# Patient Record
Sex: FEMALE | Race: WHITE | NOT HISPANIC OR LATINO | Employment: UNEMPLOYED | URBAN - NONMETROPOLITAN AREA
[De-identification: names, ages, dates, MRNs, and addresses within clinical notes are randomized per-mention and may not be internally consistent; named-entity substitution may affect disease eponyms.]

---

## 2017-07-11 ENCOUNTER — OFFICE VISIT (OUTPATIENT)
Dept: FAMILY MEDICINE CLINIC | Facility: CLINIC | Age: 37
End: 2017-07-11

## 2017-07-11 ENCOUNTER — APPOINTMENT (OUTPATIENT)
Dept: LAB | Facility: HOSPITAL | Age: 37
End: 2017-07-11

## 2017-07-11 VITALS
BODY MASS INDEX: 39.32 KG/M2 | HEART RATE: 90 BPM | SYSTOLIC BLOOD PRESSURE: 140 MMHG | HEIGHT: 65 IN | DIASTOLIC BLOOD PRESSURE: 80 MMHG | OXYGEN SATURATION: 98 % | WEIGHT: 236 LBS

## 2017-07-11 DIAGNOSIS — Z83.42 FAMILY HISTORY OF HIGH CHOLESTEROL: ICD-10-CM

## 2017-07-11 DIAGNOSIS — D68.59 PROTEIN S DEFICIENCY (HCC): ICD-10-CM

## 2017-07-11 DIAGNOSIS — Z00.00 ROUTINE GENERAL MEDICAL EXAMINATION AT A HEALTH CARE FACILITY: ICD-10-CM

## 2017-07-11 LAB
CHOLEST SERPL-MCNC: 150 MG/DL (ref 0–199)
HBA1C MFR BLD: 5.5 % (ref 4–5.6)
HDLC SERPL-MCNC: 54 MG/DL (ref 60–200)
LDLC SERPL CALC-MCNC: 56 MG/DL (ref 0–129)
LDLC/HDLC SERPL: 1.04 {RATIO} (ref 0–3.22)
T4 FREE SERPL-MCNC: 0.87 NG/DL (ref 0.78–2.19)
TRIGL SERPL-MCNC: 200 MG/DL (ref 20–199)
TSH SERPL DL<=0.05 MIU/L-ACNC: 2.34 MIU/ML (ref 0.46–4.68)
VLDLC SERPL-MCNC: 40 MG/DL

## 2017-07-11 PROCEDURE — 83036 HEMOGLOBIN GLYCOSYLATED A1C: CPT | Performed by: FAMILY MEDICINE

## 2017-07-11 PROCEDURE — 84443 ASSAY THYROID STIM HORMONE: CPT | Performed by: FAMILY MEDICINE

## 2017-07-11 PROCEDURE — 99385 PREV VISIT NEW AGE 18-39: CPT | Performed by: FAMILY MEDICINE

## 2017-07-11 PROCEDURE — 84439 ASSAY OF FREE THYROXINE: CPT | Performed by: FAMILY MEDICINE

## 2017-07-11 PROCEDURE — 80061 LIPID PANEL: CPT | Performed by: FAMILY MEDICINE

## 2017-07-11 PROCEDURE — 36415 COLL VENOUS BLD VENIPUNCTURE: CPT | Performed by: FAMILY MEDICINE

## 2017-07-11 NOTE — PROGRESS NOTES
Subjective   Leona Cardoso is a 37 y.o. female.     History of Present Illness requesting routine evaluation.  Patient  carries diagnoses of chronic protein S deficiency.    Has had 2 blood clots in the past 20 years.  Has been pregnant ×5.   was placed on no medicines following last pregnancy.  Have  need for hematological follow-up.  Strong family history of same.  Also strong family history of cardiovascular disease.  Does not smoke does not drink.  Otherwise history is significant for a cholecystectomy.  Currently feels well doses with gynecology routinely.  Declines vaccines.    The following portions of the patient's history were reviewed and updated as appropriate: allergies, current medications, past family history, past medical history, past social history, past surgical history and problem list.    Review of Systems   Constitutional: Negative for activity change, appetite change, fatigue and unexpected weight change.   HENT: Negative for trouble swallowing and voice change.    Eyes: Negative for redness and visual disturbance.   Respiratory: Negative for cough and wheezing.    Cardiovascular: Negative for chest pain and palpitations.   Gastrointestinal: Negative for abdominal pain, constipation, diarrhea, nausea and vomiting.   Genitourinary: Negative for urgency.   Musculoskeletal: Negative for joint swelling.   Neurological: Negative for syncope and headaches.   Hematological: Negative for adenopathy.   Psychiatric/Behavioral: Negative for sleep disturbance.       Objective   Physical Exam   Constitutional: She is oriented to person, place, and time. She appears well-developed.   HENT:   Head: Normocephalic.   Right Ear: External ear normal.   Left Ear: External ear normal.   Nose: Nose normal.   Mouth/Throat: Oropharynx is clear and moist.   Eyes: Conjunctivae are normal. Pupils are equal, round, and reactive to light.   Neck: Normal range of motion. Neck supple. No  thyromegaly present.   Cardiovascular: Normal rate, regular rhythm, normal heart sounds and intact distal pulses.    Pulmonary/Chest: Effort normal.   Abdominal: Soft. She exhibits no distension.   Musculoskeletal: Normal range of motion.   Neurological: She is alert and oriented to person, place, and time. She has normal reflexes.   Skin: Skin is warm and dry.   Psychiatric: She has a normal mood and affect. Her behavior is normal. Judgment and thought content normal.       Assessment/Plan   Problems Addressed this Visit        Hematopoietic and Hemostatic    Protein S deficiency    Relevant Orders    Ambulatory Referral to Hematology / Oncology       Other    BMI 39.0-39.9,adult - Primary (Chronic)    Relevant Orders    TSH    T4, Free    Hemoglobin A1c      Other Visit Diagnoses     Routine general medical examination at a health care facility        Family history of high cholesterol        Relevant Orders    Lipid Panel With LDL / HDL Ratio         need for follow-up protein S.  Defer to gynecology for routine health matters reproductive health.  Screening labs drawn.  Counseled flu vaccine in the fall.   on weight loss measures.  Recheck as directed

## 2017-07-27 ENCOUNTER — APPOINTMENT (OUTPATIENT)
Dept: LAB | Facility: HOSPITAL | Age: 37
End: 2017-07-27

## 2017-07-27 ENCOUNTER — OFFICE VISIT (OUTPATIENT)
Dept: OBSTETRICS AND GYNECOLOGY | Facility: CLINIC | Age: 37
End: 2017-07-27

## 2017-07-27 VITALS — WEIGHT: 231 LBS | HEIGHT: 64 IN | BODY MASS INDEX: 39.44 KG/M2

## 2017-07-27 DIAGNOSIS — Z01.419 GYNECOLOGIC EXAM NORMAL: Primary | ICD-10-CM

## 2017-07-27 DIAGNOSIS — N92.6 MISSED PERIOD: ICD-10-CM

## 2017-07-27 LAB — HCG SERPL QL: NEGATIVE

## 2017-07-27 PROCEDURE — 84703 CHORIONIC GONADOTROPIN ASSAY: CPT | Performed by: OBSTETRICS & GYNECOLOGY

## 2017-07-27 PROCEDURE — 99395 PREV VISIT EST AGE 18-39: CPT | Performed by: OBSTETRICS & GYNECOLOGY

## 2017-07-27 NOTE — PROGRESS NOTES
Subjective   Leona Cardoso is a 37 y.o. female.     HPI Comments: Patient presents today for annual gynecologic exam  LMP  and hasn't started her period this month. Usually has a period every 26-28 days.  Did have some spotting  which was light for a few days.    Had 2 SABs and 1 ectopic  S/p BTL  Last pap was 2016 and all have been normal  No family h/o breast cancer    Gynecologic Exam   Associated symptoms include headaches.       The following portions of the patient's history were reviewed and updated as appropriate: allergies, current medications, past family history, past medical history, past social history, past surgical history and problem list.      Review of Systems   Constitutional: Positive for fatigue.   Genitourinary: Positive for menstrual problem (recent mild irregularity).   Neurological: Positive for headaches.   All other systems reviewed and are negative.      Objective   Physical Exam   Constitutional: She is oriented to person, place, and time. She appears well-developed and well-nourished. No distress.   HENT:   Head: Normocephalic and atraumatic.   Right Ear: External ear normal.   Left Ear: External ear normal.   Nose: Nose normal.   Mouth/Throat: Oropharynx is clear and moist. No oropharyngeal exudate.   Eyes: Conjunctivae and EOM are normal. Pupils are equal, round, and reactive to light. Right eye exhibits no discharge. Left eye exhibits no discharge. No scleral icterus.   Neck: Normal range of motion. Neck supple. No tracheal deviation present. No thyromegaly present.   Cardiovascular: Normal rate, regular rhythm, normal heart sounds and intact distal pulses.  Exam reveals no gallop and no friction rub.    No murmur heard.  Pulmonary/Chest: Effort normal and breath sounds normal. No respiratory distress. She has no wheezes. She has no rales. She exhibits no mass, no tenderness, no laceration, no deformity and no retraction. Right breast exhibits no inverted  nipple, no mass, no nipple discharge, no skin change and no tenderness. Left breast exhibits no inverted nipple, no mass, no nipple discharge, no skin change and no tenderness. Breasts are symmetrical. There is no breast swelling.   Abdominal: Soft. She exhibits no distension and no mass. There is no tenderness. There is no rebound and no guarding. No hernia.   Genitourinary: Vagina normal and uterus normal. No breast tenderness, discharge or bleeding. No vaginal discharge found.   Musculoskeletal: Normal range of motion. She exhibits no edema or deformity.   Neurological: She is alert and oriented to person, place, and time. No cranial nerve deficit. Coordination normal.   Skin: Skin is warm and dry. No rash noted. She is not diaphoretic. No erythema. No pallor.   Psychiatric: She has a normal mood and affect. Her behavior is normal. Judgment and thought content normal.   Vitals reviewed.      Assessment/Plan   Leona was seen today for gynecologic exam.    Diagnoses and all orders for this visit:    Gynecologic exam normal    Missed period  -     hCG, Serum, Qualitative       Pap not indicated  Check serum HCG as patient does not feel that she needs to urinate.  Call with HCG results

## 2017-07-31 ENCOUNTER — CONSULT (OUTPATIENT)
Dept: ONCOLOGY | Facility: CLINIC | Age: 37
End: 2017-07-31

## 2017-07-31 ENCOUNTER — LAB (OUTPATIENT)
Dept: ONCOLOGY | Facility: HOSPITAL | Age: 37
End: 2017-07-31

## 2017-07-31 VITALS
RESPIRATION RATE: 18 BRPM | TEMPERATURE: 97.9 F | SYSTOLIC BLOOD PRESSURE: 148 MMHG | DIASTOLIC BLOOD PRESSURE: 91 MMHG | HEART RATE: 80 BPM | HEIGHT: 64 IN | WEIGHT: 232 LBS | BODY MASS INDEX: 39.61 KG/M2

## 2017-07-31 DIAGNOSIS — D68.59 PROTEIN S DEFICIENCY (HCC): Primary | ICD-10-CM

## 2017-07-31 DIAGNOSIS — D68.59 PROTEIN S DEFICIENCY (HCC): ICD-10-CM

## 2017-07-31 LAB
ALBUMIN SERPL-MCNC: 4.1 G/DL (ref 3.4–4.8)
ALBUMIN/GLOB SERPL: 1.3 G/DL (ref 1.1–1.8)
ALP SERPL-CCNC: 97 U/L (ref 38–126)
ALT SERPL W P-5'-P-CCNC: 33 U/L (ref 9–52)
ANION GAP SERPL CALCULATED.3IONS-SCNC: 10 MMOL/L (ref 5–15)
APTT PPP: 30.3 SECONDS (ref 20–40.3)
AST SERPL-CCNC: 22 U/L (ref 14–36)
BASOPHILS # BLD AUTO: 0.04 10*3/MM3 (ref 0–0.2)
BASOPHILS NFR BLD AUTO: 0.6 % (ref 0–2)
BILIRUB SERPL-MCNC: 0.3 MG/DL (ref 0.2–1.3)
BUN BLD-MCNC: 14 MG/DL (ref 7–21)
BUN/CREAT SERPL: 17.5 (ref 7–25)
CALCIUM SPEC-SCNC: 8.6 MG/DL (ref 8.4–10.2)
CHLORIDE SERPL-SCNC: 105 MMOL/L (ref 95–110)
CO2 SERPL-SCNC: 24 MMOL/L (ref 22–31)
CREAT BLD-MCNC: 0.8 MG/DL (ref 0.5–1)
DEPRECATED RDW RBC AUTO: 40.2 FL (ref 36.4–46.3)
EOSINOPHIL # BLD AUTO: 0.32 10*3/MM3 (ref 0–0.7)
EOSINOPHIL NFR BLD AUTO: 4.9 % (ref 0–7)
ERYTHROCYTE [DISTWIDTH] IN BLOOD BY AUTOMATED COUNT: 13.3 % (ref 11.5–14.5)
FERRITIN SERPL-MCNC: 46.7 NG/ML (ref 6.2–137)
FOLATE SERPL-MCNC: 16.5 NG/ML (ref 2.76–21)
GFR SERPL CREATININE-BSD FRML MDRD: 80 ML/MIN/1.73 (ref 64–149)
GFR SERPL CREATININE-BSD FRML MDRD: 97 ML/MIN/1.73 (ref 64–149)
GLOBULIN UR ELPH-MCNC: 3.1 GM/DL (ref 2.3–3.5)
GLUCOSE BLD-MCNC: 91 MG/DL (ref 60–100)
HCT VFR BLD AUTO: 40.5 % (ref 35–45)
HGB BLD-MCNC: 13.3 G/DL (ref 12–15.5)
IMM GRANULOCYTES # BLD: 0.02 10*3/MM3 (ref 0–0.02)
IMM GRANULOCYTES NFR BLD: 0.3 % (ref 0–0.5)
INR PPP: 0.96 (ref 0.8–1.2)
IRON 24H UR-MRATE: 38 MCG/DL (ref 37–170)
IRON SATN MFR SERPL: 11 % (ref 15–50)
LYMPHOCYTES # BLD AUTO: 1.78 10*3/MM3 (ref 0.6–4.2)
LYMPHOCYTES NFR BLD AUTO: 27.1 % (ref 10–50)
MCH RBC QN AUTO: 27.3 PG (ref 26.5–34)
MCHC RBC AUTO-ENTMCNC: 32.8 G/DL (ref 31.4–36)
MCV RBC AUTO: 83 FL (ref 80–98)
MONOCYTES # BLD AUTO: 0.59 10*3/MM3 (ref 0–0.9)
MONOCYTES NFR BLD AUTO: 9 % (ref 0–12)
NEUTROPHILS # BLD AUTO: 3.82 10*3/MM3 (ref 2–8.6)
NEUTROPHILS NFR BLD AUTO: 58.1 % (ref 37–80)
PLATELET # BLD AUTO: 244 10*3/MM3 (ref 150–450)
PMV BLD AUTO: 9.5 FL (ref 8–12)
POTASSIUM BLD-SCNC: 3.7 MMOL/L (ref 3.5–5.1)
PROT SERPL-MCNC: 7.2 G/DL (ref 6.3–8.6)
PROTHROMBIN TIME: 12.7 SECONDS (ref 11.1–15.3)
RBC # BLD AUTO: 4.88 10*6/MM3 (ref 3.77–5.16)
SODIUM BLD-SCNC: 139 MMOL/L (ref 137–145)
TIBC SERPL-MCNC: 338 MCG/DL (ref 265–497)
VIT B12 BLD-MCNC: 294 PG/ML (ref 239–931)
WBC NRBC COR # BLD: 6.57 10*3/MM3 (ref 3.2–9.8)

## 2017-07-31 PROCEDURE — 85610 PROTHROMBIN TIME: CPT | Performed by: INTERNAL MEDICINE

## 2017-07-31 PROCEDURE — 85025 COMPLETE CBC W/AUTO DIFF WBC: CPT | Performed by: INTERNAL MEDICINE

## 2017-07-31 PROCEDURE — G0463 HOSPITAL OUTPT CLINIC VISIT: HCPCS | Performed by: INTERNAL MEDICINE

## 2017-07-31 PROCEDURE — 85613 RUSSELL VIPER VENOM DILUTED: CPT | Performed by: INTERNAL MEDICINE

## 2017-07-31 PROCEDURE — 83540 ASSAY OF IRON: CPT | Performed by: INTERNAL MEDICINE

## 2017-07-31 PROCEDURE — 82746 ASSAY OF FOLIC ACID SERUM: CPT | Performed by: INTERNAL MEDICINE

## 2017-07-31 PROCEDURE — 99204 OFFICE O/P NEW MOD 45 MIN: CPT | Performed by: INTERNAL MEDICINE

## 2017-07-31 PROCEDURE — 82728 ASSAY OF FERRITIN: CPT | Performed by: INTERNAL MEDICINE

## 2017-07-31 PROCEDURE — 85305 CLOT INHIBIT PROT S TOTAL: CPT | Performed by: INTERNAL MEDICINE

## 2017-07-31 PROCEDURE — 36415 COLL VENOUS BLD VENIPUNCTURE: CPT | Performed by: INTERNAL MEDICINE

## 2017-07-31 PROCEDURE — 81241 F5 GENE: CPT | Performed by: INTERNAL MEDICINE

## 2017-07-31 PROCEDURE — 82607 VITAMIN B-12: CPT | Performed by: INTERNAL MEDICINE

## 2017-07-31 PROCEDURE — 80053 COMPREHEN METABOLIC PANEL: CPT | Performed by: INTERNAL MEDICINE

## 2017-07-31 PROCEDURE — 85306 CLOT INHIBIT PROT S FREE: CPT | Performed by: INTERNAL MEDICINE

## 2017-07-31 PROCEDURE — 85302 CLOT INHIBIT PROT C ANTIGEN: CPT | Performed by: INTERNAL MEDICINE

## 2017-07-31 PROCEDURE — 85300 ANTITHROMBIN III ACTIVITY: CPT

## 2017-07-31 PROCEDURE — 85303 CLOT INHIBIT PROT C ACTIVITY: CPT | Performed by: INTERNAL MEDICINE

## 2017-07-31 PROCEDURE — 85730 THROMBOPLASTIN TIME PARTIAL: CPT | Performed by: INTERNAL MEDICINE

## 2017-07-31 PROCEDURE — 85670 THROMBIN TIME PLASMA: CPT | Performed by: INTERNAL MEDICINE

## 2017-07-31 PROCEDURE — 81240 F2 GENE: CPT | Performed by: INTERNAL MEDICINE

## 2017-07-31 PROCEDURE — 85705 THROMBOPLASTIN INHIBITION: CPT | Performed by: INTERNAL MEDICINE

## 2017-07-31 PROCEDURE — 85732 THROMBOPLASTIN TIME PARTIAL: CPT | Performed by: INTERNAL MEDICINE

## 2017-07-31 PROCEDURE — 83550 IRON BINDING TEST: CPT | Performed by: INTERNAL MEDICINE

## 2017-07-31 PROCEDURE — 83090 ASSAY OF HOMOCYSTEINE: CPT | Performed by: INTERNAL MEDICINE

## 2017-08-01 ENCOUNTER — TELEPHONE (OUTPATIENT)
Dept: ONCOLOGY | Facility: HOSPITAL | Age: 37
End: 2017-08-01

## 2017-08-01 RX ORDER — FERROUS SULFATE 325(65) MG
325 TABLET ORAL
Qty: 30 TABLET | Refills: 3 | Status: SHIPPED | OUTPATIENT
Start: 2017-08-01 | End: 2018-01-10

## 2017-08-01 RX ORDER — DOCUSATE SODIUM 100 MG/1
100 CAPSULE, LIQUID FILLED ORAL 2 TIMES DAILY PRN
Qty: 60 CAPSULE | Refills: 2 | Status: SHIPPED | OUTPATIENT
Start: 2017-08-01 | End: 2018-01-10

## 2017-08-01 RX ORDER — LANOLIN ALCOHOL/MO/W.PET/CERES
1000 CREAM (GRAM) TOPICAL DAILY
Qty: 90 TABLET | Refills: 1 | Status: SHIPPED | OUTPATIENT
Start: 2017-08-01 | End: 2018-01-10

## 2017-08-01 RX ORDER — FOLIC ACID 1 MG/1
1 TABLET ORAL DAILY
Qty: 90 TABLET | Refills: 1 | Status: SHIPPED | OUTPATIENT
Start: 2017-08-01 | End: 2018-01-10

## 2017-08-01 NOTE — TELEPHONE ENCOUNTER
Called patient and informed that Dr. Coppola sent prescriptions to her pharmacy to . Patient states understanding.

## 2017-08-01 NOTE — TELEPHONE ENCOUNTER
----- Message from Al Coppola MD sent at 8/1/2017  8:05 AM CDT -----  I have sent prescription for ferrous sulfate, vitamin B12, folic acid and Colace to her pharmacy.  Please let her know.  Thank you

## 2017-08-02 LAB
AT III PPP CHRO-ACNC: 109 % (ref 75–135)
CARDIOLIPIN IGA SER IA-ACNC: <9 APL U/ML (ref 0–11)
CARDIOLIPIN IGG SER IA-ACNC: <9 GPL U/ML (ref 0–14)
CARDIOLIPIN IGM SER IA-ACNC: <9 MPL U/ML (ref 0–12)
HCYS SERPL-SCNC: 7.8 UMOL/L (ref 0–15)

## 2017-08-03 LAB
DRVVT IMM 1:2 NP PPP: 45 SEC (ref 0–47)
LA NT DPL PPP: 47.5 SEC (ref 0–55)
LA NT DPL/LA NT HPL PPP-RTO: 1.15 RATIO (ref 0–1.4)
LA NT PLATELET PPP: 35.5 SEC (ref 0–51.9)
LUPUS ANTICOAGULANT REFLEX: ABNORMAL
SCREEN DRVVT: 55.8 SEC (ref 0–47)
THROMBIN TIME: 18.3 SEC (ref 0–23)

## 2017-08-04 LAB
PROT C AG PPP IA-ACNC: 79 % (ref 60–150)
PROT S FREE PPP-ACNC: 31 % (ref 57–157)
PROT S PPP-ACNC: 85 % (ref 60–150)
PROTEIN C-FUNCTIONAL: 86 % (ref 73–180)
PROTEIN S-FUNCTIONAL: 58 % (ref 63–140)
PRT C ACTIVITY (CHROMOGENIC): 97 %

## 2017-08-07 LAB
F5 GENE MUT ANL BLD/T: NORMAL
FACTOR V COMMENT: NORMAL

## 2017-08-08 LAB
F2 GENE MUT ANL BLD/T: NORMAL
Lab: NORMAL

## 2017-08-21 ENCOUNTER — APPOINTMENT (OUTPATIENT)
Dept: ONCOLOGY | Facility: CLINIC | Age: 37
End: 2017-08-21

## 2017-08-24 ENCOUNTER — OFFICE VISIT (OUTPATIENT)
Dept: ONCOLOGY | Facility: CLINIC | Age: 37
End: 2017-08-24

## 2017-08-24 VITALS
TEMPERATURE: 97.5 F | DIASTOLIC BLOOD PRESSURE: 82 MMHG | HEART RATE: 70 BPM | SYSTOLIC BLOOD PRESSURE: 156 MMHG | RESPIRATION RATE: 14 BRPM | HEIGHT: 66 IN | BODY MASS INDEX: 37.45 KG/M2 | WEIGHT: 233 LBS

## 2017-08-24 DIAGNOSIS — D68.59 PROTEIN S DEFICIENCY (HCC): Primary | ICD-10-CM

## 2017-08-24 PROCEDURE — 99214 OFFICE O/P EST MOD 30 MIN: CPT | Performed by: INTERNAL MEDICINE

## 2017-08-24 PROCEDURE — G0463 HOSPITAL OUTPT CLINIC VISIT: HCPCS | Performed by: INTERNAL MEDICINE

## 2017-08-24 NOTE — PROGRESS NOTES
DATE OF VISIT: 2017    REASON FOR VISIT:  History of right lower extremity DVT and protein S deficiency    HISTORY OF PRESENT ILLNESS:    37-year-old female with a past medical history significant for right lower extremity DVT which was initially diagnosed in  immediately after childbirth for which she took blood thinner for 6 month, patient had a recurrence of DVT in  again after childbirth for which she took blood thinner for 6 month.  At some point she was tested positive for protein S deficiency.  Patient also has a strong family history of blood clots in mother, sister as well as grandparents.  Patient was initially seen in consultation on 2017 and had blood work done to be tested for hypercoagulable state.  She is here to discuss the result of blood work.  Denies any swelling of lower extremity or any new shortness of breath.  Denies any fevers or chills or night sweats.    PAST MEDICAL HISTORY:    No past medical history on file.    SOCIAL HISTORY:    Social History   Substance Use Topics   • Smoking status: Former Smoker   • Smokeless tobacco: Never Used   • Alcohol use No      Comment: special occasions       Surgical History :  Past Surgical History:   Procedure Laterality Date   •  SECTION     • CHOLECYSTECTOMY         ALLERGIES:    No Known Allergies    REVIEW OF SYSTEMS:      CONSTITUTIONAL:  Complains of fatigue. Denies any fever, chills or weight loss.      HEENT:  No epistaxis, mouth sores or difficulty swallowing.     RESPIRATORY:  No new shortness of breath. No new cough or hemoptysis.     CARDIOVASCULAR:  No chest pain or palpitations.     GASTROINTESTINAL:  No abdominal pain nausea, vomiting or blood in the stool.     GENITOURINARY: No Dysuria or Hematuria.     MUSCULOSKELETAL:  Positive for chronic swelling of right lower extremity.     LYMPHATICS:  Denies any abnormal swollen glands anywhere in the body.     NEUROLOGICAL : No tingling or numbness. No headache or  "dizziness. No seizures or balance problems.     SKIN: No new skin lesions.      PHYSICAL EXAMINATION:      VITAL SIGNS:  /82  Pulse 70  Temp 97.5 °F (36.4 °C) (Temporal Artery )   Resp 14  Ht 66\" (167.6 cm)  Wt 233 lb (106 kg)  LMP 06/22/2017 (Exact Date)  BMI 37.61 kg/m2    GENERAL:  Not in any distress.    HEENT:  Normocephalic, Atraumatic.Mild Conjunctival pallor. No icterus. Extraocular Movements Intact. No Facial Asymmetry noted.    NECK:  No adenopathy. No JVD.    RESPIRATORY:  Fair air entry bilateral. No rhonchi or wheezing.    CARDIOVASCULAR:  S1, S2. Regular rate and rhythm. No murmur or gallop appreciated.    ABDOMEN:  Soft, obese, nontender. Bowel sounds present in all four quadrants.  No organomegaly appreciated.    EXTREMITIES:  1+ edema right lower extremity.No Calf Tenderness.    NEUROLOGIC:  Alert, awake and oriented ×3.  No  Motor or sensory deficit appreciated. Cranial Nerves 2-12 grossly intact.        DIAGNOSTIC DATA:    Glucose   Date Value Ref Range Status   07/31/2017 91 60 - 100 mg/dL Final     Sodium   Date Value Ref Range Status   07/31/2017 139 137 - 145 mmol/L Final     Potassium   Date Value Ref Range Status   07/31/2017 3.7 3.5 - 5.1 mmol/L Final     CO2   Date Value Ref Range Status   07/31/2017 24.0 22.0 - 31.0 mmol/L Final     Chloride   Date Value Ref Range Status   07/31/2017 105 95 - 110 mmol/L Final     Anion Gap   Date Value Ref Range Status   07/31/2017 10.0 5.0 - 15.0 mmol/L Final     Creatinine   Date Value Ref Range Status   07/31/2017 0.80 0.50 - 1.00 mg/dL Final     BUN   Date Value Ref Range Status   07/31/2017 14 7 - 21 mg/dL Final     BUN/Creatinine Ratio   Date Value Ref Range Status   07/31/2017 17.5 7.0 - 25.0 Final     Calcium   Date Value Ref Range Status   07/31/2017 8.6 8.4 - 10.2 mg/dL Final     eGFR Non  Amer   Date Value Ref Range Status   07/31/2017 80 64 - 149 mL/min/1.73 Final     Alkaline Phosphatase   Date Value Ref Range Status "   07/31/2017 97 38 - 126 U/L Final     Total Protein   Date Value Ref Range Status   07/31/2017 7.2 6.3 - 8.6 g/dL Final     ALT (SGPT)   Date Value Ref Range Status   07/31/2017 33 9 - 52 U/L Final     AST (SGOT)   Date Value Ref Range Status   07/31/2017 22 14 - 36 U/L Final     Total Bilirubin   Date Value Ref Range Status   07/31/2017 0.3 0.2 - 1.3 mg/dL Final     Albumin   Date Value Ref Range Status   07/31/2017 4.10 3.40 - 4.80 g/dL Final     Globulin   Date Value Ref Range Status   07/31/2017 3.1 2.3 - 3.5 gm/dL Final     A/G Ratio   Date Value Ref Range Status   07/31/2017 1.3 1.1 - 1.8 g/dL Final     Lab Results   Component Value Date    WBC 6.57 07/31/2017    HGB 13.3 07/31/2017    HCT 40.5 07/31/2017    MCV 83.0 07/31/2017     07/31/2017     Lab Results   Component Value Date    NEUTROABS 3.82 07/31/2017    IRON 38 07/31/2017    TIBC 338 07/31/2017    LABIRON 11 (L) 07/31/2017    FERRITIN 46.70 07/31/2017    [id number] 294 07/31/2017    FOLATE 16.50 07/31/2017     Protein C Activity      Ref Range & Units 3wk ago     Prt C Activity (Chromogenic) % 97   Comments: Reference Range:   17 years and older: 73 - 180   Resulting Agency  [facility]   Narrative   Performed at:  [address] [place] [address] [facility]  [address]  [id number]  : Ml Arteaga MD, Phone:  [phone]      Specimen Collected: 07/31/17  4:26 PM Last Resulted: 08/04/17                   Protein C & S, Functional      Ref Range & Units 3wk ago     Protein C-Functional 73 - 180 % 86   Protein S-Functional 63 - 140 % 58 (L)             Protein C & S Antigens   Order: [id number]   Status:  Final result   Visible to patient:  No (Not Released) Dx:  Protein S deficiency      Ref Range & Units 3wk ago     Protein C Antigen 60 - 150 % 79   Protein S Ag, Total 60 - 150 % 85   Comments: This test was developed and its performance characteristics   determined by [facility]. It has not been cleared or approved   by  the Food and Drug Administration.   Protein S Ag, Free 57 - 157 % 31 (L)           Rest of the hypercoagulable testing with lupus anticoagulant, anticardiolipin antibodies, factor V Leyden mutation, antithrombin III level and prothrombin gene mutation were negative on July 31, 2017.          ASSESSMENT AND PLAN:      1.  Protein S deficiency: Based on the hypercoagulable workup done on July 31, 2017 showing has a type III protein S deficiency with normal total protein S antigen, and decreasing protein S antigen free as well as protein S functional.  Since patient has a history of DVT ×2 first time in 1998 and second time in 2006 both time after childbirth she took Coumadin for 6 month on each occasion.  Since patient has a very strong family history of DVT and pulmonary embolism, her protein S deficiency is worrisome for recurrence of DVT.  She does not have blood clot since 2006.  Treatment option of starting blood thinner were discussed with patient.  At this point she wants to avoid blood thinner if possible.  It was discussed with patient with a history she is more prone to develop third episode of blood clot and it can be life-threatening.  We will refer her for second opinion with hematologist either at [facility] or [facility] regarding blood thinners.  At this point patient remains on aspirin 81 mg by mouth daily.  She was encouraged to keep taking that.    2.  History of 3 miscarriages before ninth week of pregnancy.  Lupus anticoagulant as well as anticardiolipin antibodies has been negative.    3.  Borderline vitamin B12 and low iron saturation.  Patient has been started on vitamin B12 thousand micrograms by mouth daily, folic acid 1 mg by mouth daily and ferrous sulfate 325 mg by mouth daily.  We will recheck her anemia workup around February 2018.  She was encouraged to keep taking a supplement until then.    4. Health maintenance: Patient does not smoke.  She is only 37 years of age and not  due for colonoscopy at this point.  Remains full code.     Al Coppola MD  8/24/2017  4:59 PM

## 2017-10-06 ENCOUNTER — APPOINTMENT (OUTPATIENT)
Dept: ONCOLOGY | Facility: HOSPITAL | Age: 37
End: 2017-10-06

## 2017-10-06 ENCOUNTER — APPOINTMENT (OUTPATIENT)
Dept: ONCOLOGY | Facility: CLINIC | Age: 37
End: 2017-10-06

## 2018-01-10 ENCOUNTER — TELEPHONE (OUTPATIENT)
Dept: FAMILY MEDICINE CLINIC | Facility: CLINIC | Age: 38
End: 2018-01-10

## 2018-01-10 ENCOUNTER — OFFICE VISIT (OUTPATIENT)
Dept: FAMILY MEDICINE CLINIC | Facility: CLINIC | Age: 38
End: 2018-01-10

## 2018-01-10 VITALS
WEIGHT: 235 LBS | SYSTOLIC BLOOD PRESSURE: 122 MMHG | BODY MASS INDEX: 39.15 KG/M2 | DIASTOLIC BLOOD PRESSURE: 80 MMHG | HEIGHT: 65 IN | TEMPERATURE: 98.6 F

## 2018-01-10 DIAGNOSIS — J31.0 RHINITIS, UNSPECIFIED CHRONICITY, UNSPECIFIED TYPE: ICD-10-CM

## 2018-01-10 DIAGNOSIS — J06.9 ACUTE URI: Primary | ICD-10-CM

## 2018-01-10 PROCEDURE — 99214 OFFICE O/P EST MOD 30 MIN: CPT | Performed by: FAMILY MEDICINE

## 2018-01-10 RX ORDER — GUAIFENESIN 600 MG/1
TABLET, EXTENDED RELEASE ORAL
Qty: 30 TABLET | Refills: 3 | Status: SHIPPED | OUTPATIENT
Start: 2018-01-10 | End: 2018-03-29

## 2018-01-10 RX ORDER — FLUTICASONE PROPIONATE 50 MCG
2 SPRAY, SUSPENSION (ML) NASAL DAILY
Qty: 1 BOTTLE | Refills: 5 | Status: SHIPPED | OUTPATIENT
Start: 2018-01-10 | End: 2018-09-18

## 2018-01-10 RX ORDER — SOD CHLOR,SOD BICARB/NETI POT
PACKET, WITH RINSE DEVICE NASAL
Qty: 50 EACH | Refills: 5 | Status: SHIPPED | OUTPATIENT
Start: 2018-01-10 | End: 2018-09-18

## 2018-01-10 NOTE — PROGRESS NOTES
Subjective   Leona Cardoso is a 37 y.o. female.     History of Present Illness   history of recurrent cough congestion coryza postnasal drip.  Off and on for the past several weeks.  Some barley mainly environmental.  History noted.  No smoke exposure.  Has not had a flu vaccine.  Counseled on same.    The following portions of the patient's history were reviewed and updated as appropriate: allergies, current medications, past family history, past medical history, past social history, past surgical history and problem list.    Review of Systems   Constitutional: Negative for activity change, appetite change, fatigue and unexpected weight change.   HENT: Positive for congestion and rhinorrhea. Negative for trouble swallowing and voice change.    Eyes: Negative for redness and visual disturbance.   Respiratory: Positive for cough. Negative for wheezing.    Cardiovascular: Negative for chest pain and palpitations.   Gastrointestinal: Negative for abdominal pain, constipation, diarrhea, nausea and vomiting.   Genitourinary: Negative for urgency.   Musculoskeletal: Negative for joint swelling.   Neurological: Negative for syncope and headaches.   Hematological: Negative for adenopathy.   Psychiatric/Behavioral: Negative for sleep disturbance.       Objective   Physical Exam   Constitutional: She is oriented to person, place, and time. She appears well-developed.   HENT:   Head: Normocephalic.   Right Ear: External ear normal.   Nose: Mucosal edema and rhinorrhea present.   Mouth/Throat: Oropharynx is clear and moist.   Eyes: Pupils are equal, round, and reactive to light.   Neck: Normal range of motion. No thyromegaly present.   Cardiovascular: Normal rate, regular rhythm, normal heart sounds and intact distal pulses.  Exam reveals no gallop and no friction rub.    No murmur heard.  Pulmonary/Chest: Breath sounds normal.   Abdominal: Soft. She exhibits no distension and no mass. There is no tenderness.    Musculoskeletal: Normal range of motion.   Neurological: She is alert and oriented to person, place, and time. She has normal reflexes.   Skin: Skin is warm and dry.   Psychiatric: She has a normal mood and affect.       Assessment/Plan   Problems Addressed this Visit     None      Visit Diagnoses     Acute URI    -  Primary    Relevant Medications    fluticasone (FLONASE) 50 MCG/ACT nasal spray    Hypertonic Nasal Wash (NASAFLO NETI POT NASAL WASH) pack    guaiFENesin (MUCINEX) 600 MG 12 hr tablet    Rhinitis, unspecified chronicity, unspecified type        Relevant Medications    fluticasone (FLONASE) 50 MCG/ACT nasal spray    Hypertonic Nasal Wash (NASAFLO NETI POT NASAL WASH) pack    guaiFENesin (MUCINEX) 600 MG 12 hr tablet          Suspect viral and environmental.   Tiny pot use Mucinex use preventative measures short-term Flonase symptomatic preventative measures discussed recheck as directed

## 2019-01-11 ENCOUNTER — APPOINTMENT (OUTPATIENT)
Dept: GENERAL RADIOLOGY | Facility: HOSPITAL | Age: 39
End: 2019-01-11

## 2019-01-11 ENCOUNTER — HOSPITAL ENCOUNTER (EMERGENCY)
Facility: HOSPITAL | Age: 39
Discharge: HOME | End: 2019-01-11
Attending: EMERGENCY MEDICINE | Admitting: EMERGENCY MEDICINE

## 2019-01-11 VITALS
WEIGHT: 237 LBS | OXYGEN SATURATION: 99 % | SYSTOLIC BLOOD PRESSURE: 165 MMHG | TEMPERATURE: 97.8 F | BODY MASS INDEX: 40.46 KG/M2 | RESPIRATION RATE: 18 BRPM | DIASTOLIC BLOOD PRESSURE: 80 MMHG | HEIGHT: 64 IN | HEART RATE: 96 BPM

## 2019-01-11 DIAGNOSIS — J06.9 UPPER RESPIRATORY TRACT INFECTION, UNSPECIFIED TYPE: Primary | ICD-10-CM

## 2019-01-11 DIAGNOSIS — R91.8 LUNG NODULES: ICD-10-CM

## 2019-01-11 LAB
FLUAV AG NPH QL: NEGATIVE
FLUBV AG NPH QL IA: NEGATIVE
S PYO AG THROAT QL: NEGATIVE

## 2019-01-11 PROCEDURE — 71046 X-RAY EXAM CHEST 2 VIEWS: CPT

## 2019-01-11 PROCEDURE — 99284 EMERGENCY DEPT VISIT MOD MDM: CPT

## 2019-01-11 PROCEDURE — 99283 EMERGENCY DEPT VISIT LOW MDM: CPT

## 2019-01-11 PROCEDURE — 87880 STREP A ASSAY W/OPTIC: CPT | Performed by: EMERGENCY MEDICINE

## 2019-01-11 PROCEDURE — 87804 INFLUENZA ASSAY W/OPTIC: CPT | Performed by: EMERGENCY MEDICINE

## 2019-01-11 PROCEDURE — 87081 CULTURE SCREEN ONLY: CPT | Performed by: EMERGENCY MEDICINE

## 2019-01-11 RX ORDER — ONDANSETRON 4 MG/1
4 TABLET, ORALLY DISINTEGRATING ORAL ONCE
Status: COMPLETED | OUTPATIENT
Start: 2019-01-11 | End: 2019-01-11

## 2019-01-11 RX ORDER — DOXYCYCLINE 100 MG/1
100 CAPSULE ORAL 2 TIMES DAILY
Qty: 14 CAPSULE | Refills: 0 | Status: SHIPPED | OUTPATIENT
Start: 2019-01-11 | End: 2019-01-18

## 2019-01-11 RX ORDER — DOXYCYCLINE 100 MG/1
100 CAPSULE ORAL ONCE
Status: COMPLETED | OUTPATIENT
Start: 2019-01-11 | End: 2019-01-11

## 2019-01-11 RX ADMIN — DOXYCYCLINE 100 MG: 100 CAPSULE ORAL at 22:32

## 2019-01-11 RX ADMIN — ONDANSETRON 4 MG: 4 TABLET, ORALLY DISINTEGRATING ORAL at 22:43

## 2019-01-12 NOTE — ED PROVIDER NOTES
Subjective   38-year-old female presents to the emergency Department with 1 day of upper respiratory congestion, sinus pressure, ear fullness, and productive cough.  She states she's had some intermittent symptoms over the last couple weeks but today her symptoms have been increasingly worse.  She has not had any relief with Sudafed or Mucinex.  She denies fever but has felt chilled.  She states the pressure in her head became so severe this morning when she got up out of bed she felt lightheaded.      Family history, surgical history, social history, current medications and allergies are reviewed with the patient and triage documentation and vitals are reviewed.          History provided by:  Patient   used: No        Review of Systems   Constitutional: Positive for chills. Negative for appetite change, fatigue and fever.   HENT: Positive for congestion, ear pain, postnasal drip, sinus pressure and sinus pain. Negative for ear discharge and sore throat.    Eyes: Negative for photophobia, pain, redness and visual disturbance.   Respiratory: Positive for cough. Negative for shortness of breath.    Gastrointestinal: Negative for abdominal pain, nausea and vomiting.   Genitourinary: Negative for decreased urine volume, dysuria, frequency and urgency.   Musculoskeletal: Positive for myalgias. Negative for arthralgias.   Skin: Negative for color change and rash.   Neurological: Positive for light-headedness and headaches.       Past Medical History:   Diagnosis Date   • Dvt femoral (deep venous thrombosis) (CMS/HCC)        No Known Allergies    Past Surgical History:   Procedure Laterality Date   •  SECTION     • CHOLECYSTECTOMY         Family History   Problem Relation Age of Onset   • Hypertension Mother    • Cancer Paternal Grandmother    • Heart disease Paternal Grandfather        Social History     Socioeconomic History   • Marital status:      Spouse name: Not on file   • Number  of children: Not on file   • Years of education: Not on file   • Highest education level: Not on file   Tobacco Use   • Smoking status: Former Smoker     Last attempt to quit: 7/9/2015     Years since quitting: 3.5   • Smokeless tobacco: Never Used   Substance and Sexual Activity   • Alcohol use: No     Comment: special occasions   • Drug use: No   • Sexual activity: Yes     Partners: Male     Birth control/protection: Surgical           Objective   Physical Exam   Constitutional: Vital signs are normal. She appears well-developed and well-nourished. She is active. She does not appear ill.   HENT:   Head: Normocephalic.   Right Ear: Tympanic membrane normal.   Left Ear: Tympanic membrane normal.   Nose: Mucosal edema present. Right sinus exhibits maxillary sinus tenderness. Right sinus exhibits no frontal sinus tenderness. Left sinus exhibits maxillary sinus tenderness. Left sinus exhibits no frontal sinus tenderness.   Mouth/Throat: Oropharynx is clear and moist and mucous membranes are normal. No oropharyngeal exudate, posterior oropharyngeal edema or posterior oropharyngeal erythema. Tonsils are 1+ on the right. Tonsils are 1+ on the left. No tonsillar exudate.   Eyes: Pupils are equal, round, and reactive to light.   Neck: Normal range of motion.   Cardiovascular: Normal rate and regular rhythm.   Pulmonary/Chest: Effort normal and breath sounds normal. No respiratory distress. She has no wheezes. She has no rales.   Abdominal: Soft. Bowel sounds are normal. She exhibits no distension.   Musculoskeletal: Normal range of motion.   Neurological: She is alert.   Skin: Skin is warm and dry. Capillary refill takes less than 2 seconds.   Nursing note and vitals reviewed.      Procedures  none         ED Course      Labs Reviewed   INFLUENZA ANTIGEN, RAPID - Normal   RAPID STREP A SCREEN - Normal   BETA HEMOLYTIC STREP CULTURE, THROAT     Xr Chest 2 View    Result Date: 1/11/2019  Narrative: TWO VIEW CHEST HISTORY:  Cough. Chills. Bodyaches. Frontal and lateral films of the chest were obtained. COMPARISON: None Two right upper lobe nodules and one left lower lobe pulmonary nodule. Left lower lobe pulmonary nodule is the largest, measuring 1.4 cm. Findings worrisome for metastatic disease. No acute infiltrate. The heart is not enlarged. The pulmonary vasculature is not increased. No pleural effusion. No pneumothorax. No acute osseous abnormality. Surgical clips right upper quadrant of the abdomen.     Impression: CONCLUSION: Two right upper lobe nodules and one left lower lobe pulmonary nodule. Left lower lobe pulmonary nodule is the largest, measuring 1.4 cm. Findings worrisome for metastatic disease. No acute infiltrate. [id number] Electronically signed by:  Aguila Bonilla MD  1/11/2019 8:25 PM [employer] Work[employer]: [facility]  Number of Diagnoses or Management Options  Lung nodules:   Upper respiratory tract infection, unspecified type:      Amount and/or Complexity of Data Reviewed  Clinical lab tests: reviewed  Tests in the radiology section of CPT®: reviewed    Patient Progress  Patient progress: stable    Influenza and strep screens are negative.  X-ray imaging reveals no infiltrate.  There are 2 right upper lobe nodules and one left lower lobe pulmonary nodule largest measuring 1.4 cm that is worrisome for cancerous or metastatic process.  Patient denies any recent abdominal discomfort, distention, or shortness of breath, or other symptoms.  She is advised of findings and need for outpatient CAT scanning to further evaluate these lesions.  She started on doxycycline to help with any bacterial process that may be leading to them as well as help with her sinus congestion and drainage.  She will follow up with primary care next week.  She is agreeable to discharge.    Final diagnoses:   Upper respiratory tract infection, unspecified type   Lung nodules            Tigre Murry DO  01/12/19 0230

## 2019-01-12 NOTE — ED NOTES
Pt vomited after putting clothes on to leave. Pt states pill was in emesis. MD notified. Zofran ODT ordered per MD. Per MD: pt to restart rx for doxycycline in the morning after she has food on her belly. Pt noted.      Roseanna Olivares, RN  01/11/19 [id number]

## 2019-01-12 NOTE — DISCHARGE INSTRUCTIONS
Please return with new or worsening symptoms.  Contact Dr. Esparza for follow-up appointment to schedule further imaging as soon as possible.  Get antibiotic filled and take as instructed for the complete course.

## 2019-01-14 LAB — BACTERIA SPEC AEROBE CULT: NORMAL

## 2019-01-15 ENCOUNTER — OFFICE VISIT (OUTPATIENT)
Dept: FAMILY MEDICINE CLINIC | Facility: CLINIC | Age: 39
End: 2019-01-15

## 2019-01-15 VITALS
WEIGHT: 237 LBS | HEIGHT: 64 IN | BODY MASS INDEX: 40.46 KG/M2 | DIASTOLIC BLOOD PRESSURE: 92 MMHG | TEMPERATURE: 98.1 F | SYSTOLIC BLOOD PRESSURE: 148 MMHG

## 2019-01-15 DIAGNOSIS — R05.3 COUGH, PERSISTENT: ICD-10-CM

## 2019-01-15 DIAGNOSIS — R93.89 ABNORMAL CHEST X-RAY: Primary | ICD-10-CM

## 2019-01-15 DIAGNOSIS — D68.59 PROTEIN S DEFICIENCY (HCC): ICD-10-CM

## 2019-01-15 PROCEDURE — 99214 OFFICE O/P EST MOD 30 MIN: CPT | Performed by: FAMILY MEDICINE

## 2019-01-15 NOTE — PROGRESS NOTES
Subjective   Leona Cardoso is a 38 y.o. female.     History of Present Illness   requesting evaluation of persistent cough abnormal chest x-ray.  Has been seen multiple times urgent care and ER for cough and congestion paramedics multiple medications.  Chest x-ray done in the emergency room a few days ago did reveal soft pulmonary nodules round not grossly calcified however.  Patient quit smoking about 4 years ago.  Has had the persistent drainage and cough as outlined.  Surrounds several small children.  Has protein S deficiency as outlined previously.    The following portions of the patient's history were reviewed and updated as appropriate: allergies, current medications, past family history, past medical history, past social history, past surgical history and problem list.    Review of Systems   Constitutional: Negative for activity change, appetite change, fatigue and unexpected weight change.   HENT: Positive for congestion and rhinorrhea. Negative for trouble swallowing and voice change.    Eyes: Negative for redness and visual disturbance.   Respiratory: Positive for cough. Negative for wheezing.    Cardiovascular: Negative for chest pain and palpitations.   Gastrointestinal: Negative for abdominal pain, constipation, diarrhea, nausea and vomiting.   Genitourinary: Negative for urgency.   Musculoskeletal: Negative for joint swelling.   Neurological: Negative for syncope and headaches.   Hematological: Negative for adenopathy.   Psychiatric/Behavioral: Negative for sleep disturbance.       Objective   Physical Exam   Constitutional: She is oriented to person, place, and time. She appears well-developed.   HENT:   Head: Normocephalic.   Right Ear: External ear normal.   Nose: Mucosal edema present.   Mouth/Throat: Oropharynx is clear and moist.   Eyes: Pupils are equal, round, and reactive to light.   Neck: Normal range of motion. No thyromegaly present.   Cardiovascular: Normal rate, regular rhythm,  normal heart sounds and intact distal pulses. Exam reveals no gallop and no friction rub.   No murmur heard.  Pulmonary/Chest: Effort normal and breath sounds normal.   Abdominal: Soft. She exhibits no distension and no mass. There is no tenderness.   Musculoskeletal: Normal range of motion.   Neurological: She is alert and oriented to person, place, and time. She has normal reflexes.   Skin: Skin is warm and dry.   Psychiatric: She has a normal mood and affect.       Assessment/Plan   Leona was seen today for follow-up.    Diagnoses and all orders for this visit:    Abnormal chest x-ray  -     CT Chest With Contrast; Future  -     Ambulatory Referral to Pulmonology    Protein S deficiency (CMS/HCC)    Cough, persistent  -     CT Chest With Contrast; Future  -     Ambulatory Referral to Pulmonology        Suspect great murmurs overlay.  However the abnormal chest x-ray does bring up question atypicals versus fungal versus other.  Have counseled fishing current antibiotics counseled on environmental control measures fluids etc.  CT ordered at also probably need to see pulmonary afterwards for further evaluation and treatment

## 2019-01-21 ENCOUNTER — HOSPITAL ENCOUNTER (OUTPATIENT)
Dept: CT IMAGING | Facility: HOSPITAL | Age: 39
Discharge: HOME | End: 2019-01-21
Admitting: FAMILY MEDICINE

## 2019-01-21 DIAGNOSIS — R93.89 ABNORMAL CHEST X-RAY: ICD-10-CM

## 2019-01-21 DIAGNOSIS — R05.3 COUGH, PERSISTENT: ICD-10-CM

## 2019-01-21 PROCEDURE — 71260 CT THORAX DX C+: CPT

## 2019-01-21 PROCEDURE — 25010000002 IOPAMIDOL 61 % SOLUTION: Performed by: FAMILY MEDICINE

## 2019-01-21 RX ADMIN — IOPAMIDOL 90 ML: 612 INJECTION, SOLUTION INTRAVENOUS at 15:49

## 2019-01-22 NOTE — PROGRESS NOTES
Pulmonary Consultation    Neymar Esparza MD,    Thank you for asking me to see Leona Cardoso for   Chief Complaint   Patient presents with   • Abnormal Chest X-ray   .    Subjective     History of Present Illness  Leona Cardoso is a 38 y.o. female with a PMH significant for morbid obesity, allergies, RLE DVT, and protein S deficiency who presents for evaluation of cough and abnormal CXR. Pt states she was having issues with URI so she went to the ED where she was told she had an abnormal CXR. She was referred to her PCP who recommended a CT chest which occurred this week and showed partially calcified lung nodules. Pt reports she was having chest congestion and dyspnea. She was prescribed 2 courses of abx but has mostly using OTC cold medicines. Pt finished her last abx <1wk ago. She continues with cough productive of clear sputum, and some dyspnea. Pt has not been given any inhalers. She has not been diagnosed with COPD or asthma. Pt previously smoked 1-1.5ppd for over 20yrs but she quit in . 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 Pt has 7 children. Her paternal grandmother  with possible lung disease and there is heart disease on her paternal side. There are blood clots on her maternal side.    Review of Systems: History obtained from chart review and the patient.  Review of Systems   Constitutional: Positive for fatigue.        Snoring   Respiratory: Positive for cough and shortness of breath.    Gastrointestinal: Positive for nausea.        Heartburn   Musculoskeletal: Negative for arthralgias and back pain.   Neurological: Positive for headaches.   Psychiatric/Behavioral: Positive for dysphoric mood.     As described in the HPI. Otherwise, remainder of ROS (14 systems) were negative.    Patient Active Problem List   Diagnosis   • BMI 39.0-39.9,adult   • Protein S deficiency (CMS/[facility])         Current Outpatient Medications:   •  loratadine (CLARITIN) 10  "MG tablet, Take 1 tablet by mouth Daily., Disp: 30 tablet, Rfl: 0  •  oxymetazoline (AFRIN) 0.05 % nasal spray, 2 sprays into the nostril(s) as directed by provider 2 (Two) Times a Day., Disp: , Rfl:   •  pseudoephedrine (SUDAFED 12 HOUR) 120 MG 12 hr tablet, Take 1 tablet by mouth Every 12 (Twelve) Hours. (Patient taking differently: Take 30 mg by mouth As Needed.), Disp: 20 tablet, Rfl: 0  •  albuterol sulfate  (90 Base) MCG/ACT inhaler, Inhale 2 puffs Every 4 (Four) Hours As Needed for Wheezing or Shortness of Air., Disp: 18 g, Rfl: 11  •  guaiFENesin (MUCINEX) 600 MG 12 hr tablet, Take 1,200 mg by mouth 2 (Two) Times a Day., Disp: , Rfl:     No Known Allergies    Past Medical History:   Diagnosis Date   • Dvt femoral (deep venous thrombosis) (CMS/[facility])      Past Surgical History:   Procedure Laterality Date   •  SECTION     • CHOLECYSTECTOMY       Social History     Socioeconomic History   • Marital status:      Spouse name: Not on file   • Number of children: Not on file   • Years of education: Not on file   • Highest education level: Not on file   Tobacco Use   • Smoking status: Former Smoker     Last attempt to quit: 2015     Years since quitting: 3.5   • Smokeless tobacco: Never Used   Substance and Sexual Activity   • Alcohol use: No     Comment: special occasions   • Drug use: No   • Sexual activity: Yes     Partners: Male     Birth control/protection: Surgical     Family History   Problem Relation Age of Onset   • Hypertension Mother    • Cancer Paternal Grandmother    • Heart disease Paternal Grandfather           Objective     Blood pressure 142/90, pulse 84, height 162.6 cm (64\"), weight 108 kg (239 lb), last menstrual period 2018, SpO2 98 %.  Physical Exam   Constitutional: She is oriented to person, place, and time. Vital signs are normal. She appears well-developed and well-nourished.   Morbidly obese   HENT:   Head: Normocephalic and atraumatic.   Nose: Mucosal edema " present.   Mouth/Throat: Uvula is midline, oropharynx is clear and moist and mucous membranes are normal.   Mallampati 2   Eyes: Conjunctivae, EOM and lids are normal. Pupils are equal, round, and reactive to light.   Eyeglasses   Neck: Trachea normal and normal range of motion. No tracheal tenderness present. No thyroid mass present.   Cardiovascular: Normal rate, regular rhythm and normal heart sounds. PMI is not displaced. Exam reveals no gallop.   No murmur heard.  Pulmonary/Chest: Effort normal and breath sounds normal. No respiratory distress. She has no decreased breath sounds. She has no wheezes. She has no rhonchi. Chest wall is not dull to percussion. She exhibits no tenderness.   Abdominal: Soft. Normal appearance and bowel sounds are normal. There is no hepatomegaly. There is no tenderness.   obese   Musculoskeletal:   Normal gait, no extremity edema     Vascular Status -  Her right foot exhibits no edema. Her left foot exhibits no edema.  Lymphadenopathy:        Head (right side): No submandibular adenopathy present.        Head (left side): No submandibular adenopathy present.     She has no cervical adenopathy.        Right: No supraclavicular adenopathy present.        Left: No supraclavicular adenopathy present.   Neurological: She is alert and oriented to person, place, and time.   Skin: Skin is warm and dry. No rash noted. No cyanosis. Nails show no clubbing.   Psychiatric: She has a normal mood and affect. Her speech is normal and behavior is normal. Judgment normal.   Nursing note and vitals reviewed.      PFTs: 1/23/19 (independently reviewed and interpreted by me)  Ratio 84  FVC 3.52/ 95%  FEV1 2.94/ 97%  TLC 3.88/ 76%  DLCO 20.33/ 83%  Normal spirometry and diffusing capacity.  No significant bronchodilator response.  Reduced TLC but normal FVC.  No comparative data available.    Radiology (independently reviewed and interpreted by me): CT chest with contrast 1/21/19 showed 1.2cm round nodule  superior segment LLL with faint rim of calcification, 8mm round nodule superior segment RLL with rim of calcification, 1.2 x 0.9cm ovoid nodule posterior RUL with possible rim of calcification, calcified right hilar node       Assessment/Plan     Leona was seen today for abnormal chest x-ray.    Diagnoses and all orders for this visit:    Cough  -     Pulmonary Function Test  -     albuterol sulfate  (90 Base) MCG/ACT inhaler; Inhale 2 puffs Every 4 (Four) Hours As Needed for Wheezing or Shortness of Air.    Abnormal CXR    Lung nodules  -     CT Chest Without Contrast; Future         Discussion/ Recommendations:   PFTs did not show any obstruction.  Her CT chest was personally reviewed and showed several very well-rounded nodules with what appears to be developing rims of calcification as well as a calcified hilar nodes suggesting granulomatous disease.  She does have a smoking history but has been quit for the last 4 years.  I think her cough and congestion is likely due to bronchitis from a recent URI which is improving, but I think she would benefit from albuterol to be used as needed.  Regarding her lung nodules, given the calcification, I recommended that we have close follow-up with a repeat CT chest in 3 months.  At that time if the nodules are further calcified we will stop following them and consider them benign calcified granulomas.    -Start albuterol as needed for dyspnea or wheeze.  Counseled on proper technique.  -CT chest without contrast in 3 months.  -If she has persistent upper respiratory symptoms, I would suggest starting a nasal steroid such as Flonase daily.  -No further antibiotics or steroids indicated.    Patient's Body mass index is 41.02 kg/m². BMI is above normal parameters. Recommendations include: exercise counseling.           Return in about 4 weeks (around 2/20/2019) for Recheck cough.      Thank you for allowing me to participate in the care of Leona Cardoso. Please  do not hesitate to contact me with any questions.         This document has been electronically signed by Lisa Bonilla MD on January 23, 2019 2:05 PM      Dictated using Dragon

## 2019-01-23 ENCOUNTER — OFFICE VISIT (OUTPATIENT)
Dept: PULMONOLOGY | Facility: CLINIC | Age: 39
End: 2019-01-23

## 2019-01-23 VITALS
WEIGHT: 239 LBS | HEIGHT: 64 IN | SYSTOLIC BLOOD PRESSURE: 142 MMHG | HEART RATE: 84 BPM | DIASTOLIC BLOOD PRESSURE: 90 MMHG | BODY MASS INDEX: 40.8 KG/M2 | OXYGEN SATURATION: 98 %

## 2019-01-23 DIAGNOSIS — Z87.891 PERSONAL HISTORY OF TOBACCO USE, PRESENTING HAZARDS TO HEALTH: ICD-10-CM

## 2019-01-23 DIAGNOSIS — R05.9 COUGH: Primary | ICD-10-CM

## 2019-01-23 DIAGNOSIS — R93.89 ABNORMAL CXR: ICD-10-CM

## 2019-01-23 DIAGNOSIS — R93.89 ABNORMAL CHEST X-RAY: Primary | ICD-10-CM

## 2019-01-23 DIAGNOSIS — E66.01 CLASS 3 SEVERE OBESITY DUE TO EXCESS CALORIES WITHOUT SERIOUS COMORBIDITY WITH BODY MASS INDEX (BMI) OF 40.0 TO 44.9 IN ADULT (HCC): ICD-10-CM

## 2019-01-23 DIAGNOSIS — R91.8 LUNG NODULES: ICD-10-CM

## 2019-01-23 PROBLEM — Z86.718 HISTORY OF DVT OF LOWER EXTREMITY: Status: ACTIVE | Noted: 2019-01-23

## 2019-01-23 PROCEDURE — 99204 OFFICE O/P NEW MOD 45 MIN: CPT | Performed by: INTERNAL MEDICINE

## 2019-01-23 PROCEDURE — 94729 DIFFUSING CAPACITY: CPT | Performed by: INTERNAL MEDICINE

## 2019-01-23 PROCEDURE — 94060 EVALUATION OF WHEEZING: CPT | Performed by: INTERNAL MEDICINE

## 2019-01-23 PROCEDURE — 94727 GAS DIL/WSHOT DETER LNG VOL: CPT | Performed by: INTERNAL MEDICINE

## 2019-01-23 RX ORDER — ALBUTEROL SULFATE 90 UG/1
2 AEROSOL, METERED RESPIRATORY (INHALATION) EVERY 4 HOURS PRN
Qty: 18 G | Refills: 11 | Status: SHIPPED | OUTPATIENT
Start: 2019-01-23 | End: 2019-10-29

## 2019-01-23 RX ORDER — OXYMETAZOLINE HYDROCHLORIDE 0.05 G/100ML
2 SPRAY NASAL AS NEEDED
COMMUNITY
End: 2019-10-29

## 2019-01-29 ENCOUNTER — OFFICE VISIT (OUTPATIENT)
Dept: ONCOLOGY | Facility: CLINIC | Age: 39
End: 2019-01-29

## 2019-01-29 ENCOUNTER — APPOINTMENT (OUTPATIENT)
Dept: ONCOLOGY | Facility: HOSPITAL | Age: 39
End: 2019-01-29

## 2019-01-29 VITALS
RESPIRATION RATE: 18 BRPM | HEART RATE: 67 BPM | DIASTOLIC BLOOD PRESSURE: 89 MMHG | BODY MASS INDEX: 41.11 KG/M2 | HEIGHT: 64 IN | WEIGHT: 240.8 LBS | TEMPERATURE: 97.9 F | SYSTOLIC BLOOD PRESSURE: 158 MMHG | OXYGEN SATURATION: 98 %

## 2019-01-29 DIAGNOSIS — Z86.718 HISTORY OF DVT OF LOWER EXTREMITY: Primary | ICD-10-CM

## 2019-01-29 DIAGNOSIS — R91.8 MULTIPLE LUNG NODULES ON CT: ICD-10-CM

## 2019-01-29 DIAGNOSIS — D68.59 PROTEIN S DEFICIENCY (HCC): Chronic | ICD-10-CM

## 2019-01-29 DIAGNOSIS — Z86.718 HISTORY OF DVT OF LOWER EXTREMITY: ICD-10-CM

## 2019-01-29 LAB
ALBUMIN SERPL-MCNC: 4.2 G/DL (ref 3.4–4.8)
ALBUMIN/GLOB SERPL: 1.4 G/DL (ref 1.1–1.8)
ALP SERPL-CCNC: 74 U/L (ref 38–126)
ALT SERPL W P-5'-P-CCNC: 20 U/L (ref 9–52)
ANION GAP SERPL CALCULATED.3IONS-SCNC: 6 MMOL/L (ref 5–15)
AST SERPL-CCNC: 21 U/L (ref 14–36)
BASOPHILS # BLD AUTO: 0.03 10*3/MM3 (ref 0–0.2)
BASOPHILS NFR BLD AUTO: 0.5 % (ref 0–2)
BILIRUB SERPL-MCNC: 0.5 MG/DL (ref 0.2–1.3)
BUN BLD-MCNC: 11 MG/DL (ref 7–21)
BUN/CREAT SERPL: 14.9 (ref 7–25)
CALCIUM SPEC-SCNC: 9.2 MG/DL (ref 8.4–10.2)
CHLORIDE SERPL-SCNC: 102 MMOL/L (ref 95–110)
CO2 SERPL-SCNC: 27 MMOL/L (ref 22–31)
CREAT BLD-MCNC: 0.74 MG/DL (ref 0.5–1)
DEPRECATED RDW RBC AUTO: 39.1 FL (ref 36.4–46.3)
EOSINOPHIL # BLD AUTO: 0.23 10*3/MM3 (ref 0–0.7)
EOSINOPHIL NFR BLD AUTO: 3.7 % (ref 0–7)
ERYTHROCYTE [DISTWIDTH] IN BLOOD BY AUTOMATED COUNT: 13 % (ref 11.5–14.5)
GFR SERPL CREATININE-BSD FRML MDRD: 87 ML/MIN/1.73 (ref 64–149)
GLOBULIN UR ELPH-MCNC: 3 GM/DL (ref 2.3–3.5)
GLUCOSE BLD-MCNC: 97 MG/DL (ref 60–100)
HCT VFR BLD AUTO: 40.8 % (ref 35–45)
HGB BLD-MCNC: 14.2 G/DL (ref 12–15.5)
IMM GRANULOCYTES # BLD AUTO: 0.01 10*3/MM3 (ref 0–0.02)
IMM GRANULOCYTES NFR BLD AUTO: 0.2 % (ref 0–0.5)
LYMPHOCYTES # BLD AUTO: 1.74 10*3/MM3 (ref 0.6–4.2)
LYMPHOCYTES NFR BLD AUTO: 27.6 % (ref 10–50)
MCH RBC QN AUTO: 28.7 PG (ref 26.5–34)
MCHC RBC AUTO-ENTMCNC: 34.8 G/DL (ref 31.4–36)
MCV RBC AUTO: 82.4 FL (ref 80–98)
MONOCYTES # BLD AUTO: 0.5 10*3/MM3 (ref 0–0.9)
MONOCYTES NFR BLD AUTO: 7.9 % (ref 0–12)
NEUTROPHILS # BLD AUTO: 3.79 10*3/MM3 (ref 2–8.6)
NEUTROPHILS NFR BLD AUTO: 60.1 % (ref 37–80)
PLATELET # BLD AUTO: 258 10*3/MM3 (ref 150–450)
PMV BLD AUTO: 9 FL (ref 8–12)
POTASSIUM BLD-SCNC: 3.8 MMOL/L (ref 3.5–5.1)
PROT SERPL-MCNC: 7.2 G/DL (ref 6.3–8.6)
RBC # BLD AUTO: 4.95 10*6/MM3 (ref 3.77–5.16)
SODIUM BLD-SCNC: 135 MMOL/L (ref 137–145)
WBC NRBC COR # BLD: 6.3 10*3/MM3 (ref 3.2–9.8)

## 2019-01-29 PROCEDURE — 99214 OFFICE O/P EST MOD 30 MIN: CPT | Performed by: INTERNAL MEDICINE

## 2019-01-29 PROCEDURE — G8417 CALC BMI ABV UP PARAM F/U: HCPCS | Performed by: INTERNAL MEDICINE

## 2019-01-29 PROCEDURE — 1123F ACP DISCUSS/DSCN MKR DOCD: CPT | Performed by: INTERNAL MEDICINE

## 2019-01-29 PROCEDURE — G0463 HOSPITAL OUTPT CLINIC VISIT: HCPCS | Performed by: INTERNAL MEDICINE

## 2019-01-29 PROCEDURE — 85025 COMPLETE CBC W/AUTO DIFF WBC: CPT | Performed by: INTERNAL MEDICINE

## 2019-01-29 PROCEDURE — 80053 COMPREHEN METABOLIC PANEL: CPT | Performed by: INTERNAL MEDICINE

## 2019-01-29 RX ORDER — WARFARIN SODIUM 7.5 MG/1
TABLET ORAL
Qty: 15 TABLET | Refills: 0 | Status: SHIPPED | OUTPATIENT
Start: 2019-01-29 | End: 2019-02-11 | Stop reason: SDUPTHER

## 2019-01-29 NOTE — PROGRESS NOTES
DATE OF VISIT: 2019      REASON FOR VISIT:  History of right lower extremity DVT and protein S deficiency ,lung nodules      HISTORY OF PRESENT ILLNESS:    38-year-old female with a past medical history significant for right lower extremity DVT which was initially diagnosed in  immediately after childbirth for which she took blood thinner for 6 month, patient had a recurrence of DVT in  again after childbirth for which she took blood thinner for 6 month.  At some point she was tested positive for protein S deficiency.  Patient also has a strong family history of blood clots in mother, sister as well as grandparents.  Patient was initially seen in consultation on 2017 and had hypercoagulable workup done which showed type III protein S deficiency.  Patient was recommended to start anticoagulation but she was reluctant to start anticoagulation at this point and she was lost to follow-up after 2017.  Patient is coming back to [facility] to discuss anticoagulation recommendation today.  Denies any swelling of lower extremity or any new shortness of breath.  Denies any fevers or chills or night sweats.        PAST MEDICAL HISTORY:    Past Medical History:   Diagnosis Date   • Dvt femoral (deep venous thrombosis) (CMS/[facility])        SOCIAL HISTORY:    Social History     Tobacco Use   • Smoking status: Former Smoker     Last attempt to quit: 2015     Years since quitting: 3.5   • Smokeless tobacco: Never Used   Substance Use Topics   • Alcohol use: No     Comment: special occasions   • Drug use: No       Surgical History :  Past Surgical History:   Procedure Laterality Date   •  SECTION     • CHOLECYSTECTOMY         ALLERGIES:    No Known Allergies    REVIEW OF SYSTEMS:      CONSTITUTIONAL:  Complains of fatigue. Denies any fever, chills or weight loss.      HEENT:  No epistaxis, mouth sores or difficulty swallowing.     RESPIRATORY:  No new shortness of breath. No new cough or  "hemoptysis.     CARDIOVASCULAR:  No chest pain or palpitations.     GASTROINTESTINAL:  No abdominal pain nausea, vomiting or blood in the stool.     GENITOURINARY: No Dysuria or Hematuria.     MUSCULOSKELETAL:  Positive for chronic swelling of right lower extremity.     LYMPHATICS:  Denies any abnormal swollen glands anywhere in the body.     NEUROLOGICAL : No tingling or numbness. No headache or dizziness. No seizures or balance problems.     SKIN: No new skin lesions.          PHYSICAL EXAMINATION:      VITAL SIGNS:  /89   Pulse 67   Temp 97.9 °F (36.6 °C) (Temporal)   Resp 18   Ht 162.6 cm (64.02\")   Wt 109 kg (240 lb 12.8 oz)   SpO2 98%   BMI 41.31 kg/m²      ECOG performance status: 1    GENERAL:  Not in any distress.Obese female.    HEENT:  Normocephalic, Atraumatic.Mild Conjunctival pallor. No icterus. Extraocular Movements Intact. No Facial Asymmetry noted.    NECK:  No adenopathy. No JVD.    RESPIRATORY:  Fair air entry bilateral. No rhonchi or wheezing.    CARDIOVASCULAR:  S1, S2. Regular rate and rhythm. No murmur or gallop appreciated.    ABDOMEN:  Soft, obese, nontender. Bowel sounds present in all four quadrants.  No hepatosplenomegaly appreciated due to body habitus.    MUSCULOSKELTAL:  Trace lower extremity edema.No Calf Tenderness.Decreased range of motion.    NEUROLOGIC:  Alert, awake and oriented ×3.  No  Motor or sensory deficit appreciated. Cranial Nerves 2-12 grossly intact.    SKIN: No new skin lesions.Skin is warm and moist to touch.    LYMPHATICS: No new lymph node enlargement in neck or supraclavicular area.        DIAGNOSTIC DATA:    Glucose   Date Value Ref Range Status   01/29/2019 97 60 - 100 mg/dL Final     Sodium   Date Value Ref Range Status   01/29/2019 135 (L) 137 - 145 mmol/L Final     Potassium   Date Value Ref Range Status   01/29/2019 3.8 3.5 - 5.1 mmol/L Final     CO2   Date Value Ref Range Status   01/29/2019 27.0 22.0 - 31.0 mmol/L Final     Chloride   Date " Value Ref Range Status   01/29/2019 102 95 - 110 mmol/L Final     Anion Gap   Date Value Ref Range Status   01/29/2019 6.0 5.0 - 15.0 mmol/L Final     Creatinine   Date Value Ref Range Status   01/29/2019 0.74 0.50 - 1.00 mg/dL Final     BUN   Date Value Ref Range Status   01/29/2019 11 7 - 21 mg/dL Final     BUN/Creatinine Ratio   Date Value Ref Range Status   01/29/2019 14.9 7.0 - 25.0 Final     Calcium   Date Value Ref Range Status   01/29/2019 9.2 8.4 - 10.2 mg/dL Final     eGFR Non  Amer   Date Value Ref Range Status   01/29/2019 87 64 - 149 mL/min/1.73 Final     Alkaline Phosphatase   Date Value Ref Range Status   01/29/2019 74 38 - 126 U/L Final     Total Protein   Date Value Ref Range Status   01/29/2019 7.2 6.3 - 8.6 g/dL Final     ALT (SGPT)   Date Value Ref Range Status   01/29/2019 20 9 - 52 U/L Final     AST (SGOT)   Date Value Ref Range Status   01/29/2019 21 14 - 36 U/L Final     Total Bilirubin   Date Value Ref Range Status   01/29/2019 0.5 0.2 - 1.3 mg/dL Final     Albumin   Date Value Ref Range Status   01/29/2019 4.20 3.40 - 4.80 g/dL Final     Globulin   Date Value Ref Range Status   01/29/2019 3.0 2.3 - 3.5 gm/dL Final     Lab Results   Component Value Date    WBC 6.30 01/29/2019    HGB 14.2 01/29/2019    HCT 40.8 01/29/2019    MCV 82.4 01/29/2019     01/29/2019     Lab Results   Component Value Date    NEUTROABS 3.79 01/29/2019    IRON 38 07/31/2017    TIBC 338 07/31/2017    LABIRON 11 (L) 07/31/2017    FERRITIN 46.70 07/31/2017    [id number] 294 07/31/2017    FOLATE 16.50 07/31/2017     Protein C Activity      Ref Range & Units 3wk ago     Prt C Activity (Chromogenic) % 97   Comments: Reference Range:   17 years and older: 73 - 180   Resulting Agency  [facility]   Narrative   Performed at:  [facility]  [address]  [id number]  : Ml Arteaga MD, Phone:  [phone]      Specimen Collected: 07/31/17  4:26 PM Last Resulted:  08/04/17                   Protein C & S, Functional      Ref Range & Units 3wk ago     Protein C-Functional 73 - 180 % 86   Protein S-Functional 63 - 140 % 58 (L)             Protein C & S Antigens   Order: [id number]   Status:  Final result   Visible to patient:  No (Not Released) Dx:  Protein S deficiency      Ref Range & Units 3wk ago     Protein C Antigen 60 - 150 % 79   Protein S Ag, Total 60 - 150 % 85   Comments: This test was developed and its performance characteristics   determined by [facility]. It has not been cleared or approved   by the Food and Drug Administration.   Protein S Ag, Free 57 - 157 % 31 (L)           Rest of the hypercoagulable testing with lupus anticoagulant, anticardiolipin antibodies, factor V Leyden mutation, antithrombin III level and prothrombin gene mutation were negative on July 31, 2017.        RADIOLOGY DATA:  CT chest with contrast done on January 21, 2019 showed:  IMPRESSION:  CONCLUSION:  Bilateral nodules with faint rims of peripheral calcification,  more likely due to an infectious or inflammatory process than  neoplasm. Recommend follow-up chest CT in three months to assess  for stability. These could also be further assessed with PET/CT  or percutaneous biopsy of the largest lesion.        ASSESSMENT AND PLAN:      1.  Protein S deficiency:  - Based on the hypercoagulable workup done on July 31, 2017 showing has a type III protein S deficiency with normal total protein S antigen, and decreasing protein S antigen free as well as protein S functional.    -Since patient has a history of DVT ×2 first time in 1998 and second time in 2006 both time after childbirth she took Coumadin for 6 month on each occasion.  Since patient has a very strong family history of DVT and pulmonary embolism, her protein S deficiency is worrisome for recurrence of DVT.  She does not have blood clot since 2006.    -In August 2017, for anticoagulation was recommended to patient.  However she was not  willing to do anticoagulation at that point.  She was also offered to go for second opinion, she did not do that secondary to financial constraints.  -Now patient is interested in taking anticoagulation again.  -Due to 2 episode of DVT and type III protein S deficiency, anticoagulation option consisting of Lovenox with Coumadin or newer anticoagulation like Apixaban or xarelto were discussed with patient.  -She is interested in taking Coumadin again.  -Prescription for Lovenox 100 mg twice daily and Coumadin 7.5 mg has been sent to her pharmacy today on January 29, 2019.  -Patient wants instructed not to start taking Coumadin until after fourth dose of Lovenox on day 2 due to protein S deficiency.    -We will also refer her to Coumadin clinic for further management of INR.    -We will ask patient to return to clinic in 3 months with a repeat CBC and CMP to be done on that day.  -Patient was instructed to come to the emergency room if she starts having any bleeding complication.      2.  History of 3 miscarriages before ninth week of pregnancy.  Lupus anticoagulant as well as anticardiolipin antibodies has been negative.    3.  Multiple lung nodules:  -Patient was found to have multiple lung nodules on a CT scan done in January 2019.  Nodules does have a calcification, most likely benign etiology.  -We will consider doing another CT scan around July 2019 for stability of her lung nodules since patient has a history of smoking.    4. Health maintenance: Patient does not smoke.  She is only 38 years of age and not due for colonoscopy at this point.      5. BMI: Patient's Body mass index is 41.31 kg/m². BMI is higher than reference range.  Patient is aware of elevated BMI    6. Advance Care Planning: For now patient remains full code and is able to make  Her decisions.  Patient has health care surrogate mentioned on chart.         Al Coppola MD  1/29/2019  4:46 PM

## 2019-02-04 ENCOUNTER — ANTICOAGULATION VISIT (OUTPATIENT)
Dept: CARDIAC SURGERY | Facility: CLINIC | Age: 39
End: 2019-02-04

## 2019-02-04 VITALS — DIASTOLIC BLOOD PRESSURE: 96 MMHG | HEART RATE: 89 BPM | OXYGEN SATURATION: 98 % | SYSTOLIC BLOOD PRESSURE: 148 MMHG

## 2019-02-04 DIAGNOSIS — Z86.718 HISTORY OF DVT OF LOWER EXTREMITY: ICD-10-CM

## 2019-02-04 DIAGNOSIS — D68.59 PROTEIN S DEFICIENCY (HCC): ICD-10-CM

## 2019-02-04 DIAGNOSIS — Z79.01 LONG TERM (CURRENT) USE OF ANTICOAGULANTS: ICD-10-CM

## 2019-02-04 LAB — INR PPP: 1.5 (ref 0.9–1.1)

## 2019-02-04 PROCEDURE — 85610 PROTHROMBIN TIME: CPT | Performed by: NURSE PRACTITIONER

## 2019-02-04 PROCEDURE — 99211 OFF/OP EST MAY X REQ PHY/QHP: CPT | Performed by: NURSE PRACTITIONER

## 2019-02-04 NOTE — PROGRESS NOTES
PT here today for enrollment in Coumadin Clinic. PT was dx several years ago with Protein S deficiency. PT has been on Coumadin for 6 month increments after 2 postpartum blood clots. PT has not been on any anticoagulation in 13 years but due to strong family hx, pt has decided to restart therapy after discussion with Dr Coppola. PT was given informational packet and key points reviewed. Educated pt on reasoning from Coumadin therapy which includes pt's risk of other blood clots due to clotting disorder. Explained mechanism of action with Coumadin and Vitamin k. Risk and benefits of therapy were explained including rationale for INR range. Stressed compliance and consistency with diet, medications and appts. PT was educated on interactions of other medications and instructed to call [facility] with any med changes immediately including those that are short term. Instructed pt to take Coumadin between supper time and bed time. PT was educated on expectations of visits including intervals and frequencies. Educated pt on dietary restrictions and Coumadin friendly diet. PT was given informational sheets regarding acceptable green vegs. PT was very concerned about dietary restrictions due to frequent consumption of restricted green vegs. Educated pt on reasoning behind not consuming those fruits and vegs. PT states she will avoid those high vitamin k foods. PT was instructed to contact [facility] for any planned medical procedures and to make sure all providers are aware of Coumadin therapy. PT was educated on need for Lovenox if Coumadin therapy is interrupted. PT was educated on s/s of bleeding and to report bleeding to ER immediately. PT was advised to wear medical alert bracelet. Emphasized safety while on blood thinner including power tool safety and to always obtain CT scan for head/blunt trauma. PT was instructed that Coumadin Clinic would manage pt's INR and refill Coumadin. 25 mins of direct patient education provided. PT was  instructed on dosing and to continue Lovenox 100mg BID. PT will be seen on Wednesday. PT states she is considering NOAC. PT has concerns at this time but will continue Coumadin therapy.       This document has been electronically signed by RISHABH Delvalle @  On February 4, 2019 1:19 PM

## 2019-02-06 ENCOUNTER — ANTICOAGULATION VISIT (OUTPATIENT)
Dept: CARDIAC SURGERY | Facility: CLINIC | Age: 39
End: 2019-02-06

## 2019-02-06 VITALS — DIASTOLIC BLOOD PRESSURE: 85 MMHG | HEART RATE: 78 BPM | OXYGEN SATURATION: 96 % | SYSTOLIC BLOOD PRESSURE: 130 MMHG

## 2019-02-06 DIAGNOSIS — Z86.718 HISTORY OF DVT OF LOWER EXTREMITY: ICD-10-CM

## 2019-02-06 DIAGNOSIS — Z79.01 LONG TERM (CURRENT) USE OF ANTICOAGULANTS: ICD-10-CM

## 2019-02-06 DIAGNOSIS — D68.59 PROTEIN S DEFICIENCY (HCC): ICD-10-CM

## 2019-02-06 LAB — INR PPP: 2.1 (ref 0.9–1.1)

## 2019-02-06 PROCEDURE — 99211 OFF/OP EST MAY X REQ PHY/QHP: CPT | Performed by: NURSE PRACTITIONER

## 2019-02-06 PROCEDURE — 85610 PROTHROMBIN TIME: CPT | Performed by: NURSE PRACTITIONER

## 2019-02-06 NOTE — PROGRESS NOTES
Today's INR is 2.1. Pt denies med changes or bleeding problems.  Dose slightly adjusted due to rapid rise in INR. Pt instructed to stop Lovenox injections, follow dosing calendar, and eat a serving of green veggies on Friday; pt verbalized. Patient instructed regarding medication; results given and questions answered. Nutritional counseling given.  Dietary factors affecting therapy addressed.  Patient instructed to monitor for excessive bruising or bleeding. Will recheck in 5 days.         This document has been electronically signed by NARDA Guardado @ on February 6, 2019 1:21 PM

## 2019-02-11 ENCOUNTER — ANTICOAGULATION VISIT (OUTPATIENT)
Dept: CARDIAC SURGERY | Facility: CLINIC | Age: 39
End: 2019-02-11

## 2019-02-11 ENCOUNTER — OFFICE VISIT (OUTPATIENT)
Dept: FAMILY MEDICINE CLINIC | Facility: CLINIC | Age: 39
End: 2019-02-11

## 2019-02-11 ENCOUNTER — DOCUMENTATION (OUTPATIENT)
Dept: CARDIAC SURGERY | Facility: CLINIC | Age: 39
End: 2019-02-11

## 2019-02-11 VITALS
WEIGHT: 240.4 LBS | HEIGHT: 64 IN | DIASTOLIC BLOOD PRESSURE: 90 MMHG | SYSTOLIC BLOOD PRESSURE: 148 MMHG | TEMPERATURE: 98 F | BODY MASS INDEX: 41.04 KG/M2

## 2019-02-11 VITALS — HEART RATE: 80 BPM | DIASTOLIC BLOOD PRESSURE: 98 MMHG | OXYGEN SATURATION: 98 % | SYSTOLIC BLOOD PRESSURE: 140 MMHG

## 2019-02-11 DIAGNOSIS — J06.9 ACUTE URI: Primary | ICD-10-CM

## 2019-02-11 DIAGNOSIS — Z79.01 LONG TERM (CURRENT) USE OF ANTICOAGULANTS: ICD-10-CM

## 2019-02-11 DIAGNOSIS — J02.9 ACUTE PHARYNGITIS, UNSPECIFIED ETIOLOGY: ICD-10-CM

## 2019-02-11 DIAGNOSIS — D68.59 PROTEIN S DEFICIENCY (HCC): ICD-10-CM

## 2019-02-11 DIAGNOSIS — J01.00 ACUTE NON-RECURRENT MAXILLARY SINUSITIS: ICD-10-CM

## 2019-02-11 DIAGNOSIS — Z86.718 HISTORY OF DVT OF LOWER EXTREMITY: ICD-10-CM

## 2019-02-11 LAB — INR PPP: 2.5 (ref 0.9–1.1)

## 2019-02-11 PROCEDURE — 99213 OFFICE O/P EST LOW 20 MIN: CPT | Performed by: FAMILY MEDICINE

## 2019-02-11 PROCEDURE — 85610 PROTHROMBIN TIME: CPT | Performed by: NURSE PRACTITIONER

## 2019-02-11 RX ORDER — WARFARIN SODIUM 7.5 MG/1
TABLET ORAL
Qty: 35 TABLET | Refills: 1 | Status: SHIPPED | OUTPATIENT
Start: 2019-02-11 | End: 2019-04-17 | Stop reason: SDUPTHER

## 2019-02-11 RX ORDER — FLUTICASONE PROPIONATE 50 MCG
2 SPRAY, SUSPENSION (ML) NASAL DAILY
Qty: 1 BOTTLE | Refills: 11 | Status: SHIPPED | OUTPATIENT
Start: 2019-02-11 | End: 2019-05-07

## 2019-02-11 RX ORDER — CEPHALEXIN 500 MG/1
500 CAPSULE ORAL 3 TIMES DAILY
Qty: 21 CAPSULE | Refills: 0 | Status: SHIPPED | OUTPATIENT
Start: 2019-02-11 | End: 2019-02-22

## 2019-02-11 NOTE — PROGRESS NOTES
Today's INR is 2.5. Denies any new medications or bleeding issues. Denies any s/s of blood clot. PT instructed on dosing and to continue Coumadin friendly diet. PT will be seen in 1 week. Patient instructed regarding medication; results given and questions answered. Nutritional counseling given.  Dietary factors affecting therapy addressed.  Patient instructed to monitor for excessive bruising or bleeding. PT verbalizes understanding.         This document has been electronically signed by Radha Hinton, NARDA @ on February 11, 2019 1:26 PM

## 2019-02-11 NOTE — PROGRESS NOTES
PT called and states she is starting Keflex TID x7 days today. Instructed to continue same dose but was given appt for Friday. PT verbalizes understanding.

## 2019-02-15 ENCOUNTER — ANTICOAGULATION VISIT (OUTPATIENT)
Dept: CARDIAC SURGERY | Facility: CLINIC | Age: 39
End: 2019-02-15

## 2019-02-15 VITALS — OXYGEN SATURATION: 99 % | HEART RATE: 87 BPM

## 2019-02-15 DIAGNOSIS — Z86.718 HISTORY OF DVT OF LOWER EXTREMITY: ICD-10-CM

## 2019-02-15 DIAGNOSIS — D68.59 PROTEIN S DEFICIENCY (HCC): ICD-10-CM

## 2019-02-15 DIAGNOSIS — Z79.01 LONG TERM (CURRENT) USE OF ANTICOAGULANTS: ICD-10-CM

## 2019-02-15 LAB — INR PPP: 2.2 (ref 0.9–1.1)

## 2019-02-15 PROCEDURE — 85610 PROTHROMBIN TIME: CPT | Performed by: NURSE PRACTITIONER

## 2019-02-15 NOTE — PROGRESS NOTES
Today's INR is 2.2. PT will be finish AB on Monday. Denies any other med changes or bleeding issues. Denies any s/s of blood clot. PT instructed to continue same dose but increase vit k tomorrow due to AB. PT will keep appt on Monday. Patient instructed regarding medication; results given and questions answered. Nutritional counseling given.  Dietary factors affecting therapy addressed.  Patient instructed to monitor for excessive bruising or bleeding. PT verbalizes understanding.       This document has been electronically signed by RISHABH Delvalle @  On February 15, 2019 2:36 PM

## 2019-02-18 ENCOUNTER — ANTICOAGULATION VISIT (OUTPATIENT)
Dept: CARDIAC SURGERY | Facility: CLINIC | Age: 39
End: 2019-02-18

## 2019-02-18 VITALS — HEART RATE: 72 BPM | SYSTOLIC BLOOD PRESSURE: 138 MMHG | DIASTOLIC BLOOD PRESSURE: 90 MMHG

## 2019-02-18 DIAGNOSIS — Z86.718 HISTORY OF DVT OF LOWER EXTREMITY: ICD-10-CM

## 2019-02-18 DIAGNOSIS — Z79.01 LONG TERM (CURRENT) USE OF ANTICOAGULANTS: ICD-10-CM

## 2019-02-18 DIAGNOSIS — D68.59 PROTEIN S DEFICIENCY (HCC): ICD-10-CM

## 2019-02-18 LAB — INR PPP: 2.1 (ref 0.9–1.1)

## 2019-02-18 PROCEDURE — 85610 PROTHROMBIN TIME: CPT | Performed by: NURSE PRACTITIONER

## 2019-02-18 NOTE — PROGRESS NOTES
Today's INR is 2.1.   Pt states no changes to meds or diet.  No bleeding issues.  Recheck INR this Friday current coumadin dosing schedule.  Pt verbalized instructions.  Patient instructed regarding medication; results given and questions answered. Nutritional counseling given.  Dietary factors affecting therapy addressed.  Patient instructed to monitor for excessive bruising or bleeding.      This document has been electronically signed by ERI DelvalleCNP-BC [place]  On February 18, 2019 12:35 PM

## 2019-02-21 NOTE — PROGRESS NOTES
Pulmonary Office Follow-up     Leona Cardoso is seen in the office today for   Chief Complaint   Patient presents with   • Cough   .    Subjective     History of Present Illness  Leona Cardoso is a 38 y.o. female with a PMH significant for morbid obesity, allergies, RLE DVT, and protein S deficiency who presents for follow-up of cough and dyspnea.  She was last seen on 1/23/19, at which time I recommended starting albuterol as needed and to have a repeat CT chest without contrast in 3 months.  She states that she has used albuterol a few times since her last visit when she felt tight in her chest and it has helped relieve that sensation.  She does complain of feeling jittery after using the albuterol, so she tries to not use it if she can.  Otherwise, her URI and cough have completely resolved.  She denies any recent illnesses or bleeding.  Patient is scheduled to have her INR checked today for continuing management of her warfarin.    Review of Systems: History obtained from chart review and the patient.  Review of Systems   Constitutional: Positive for fatigue.   Respiratory: Positive for chest tightness and shortness of breath. Negative for cough.    Musculoskeletal: Negative for arthralgias and back pain.     As described in the HPI. Otherwise, remainder of ROS (14 systems) were negative.    Patient Active Problem List   Diagnosis   • Class 3 severe obesity due to excess calories without serious comorbidity with body mass index (BMI) of 40.0 to 44.9 in adult (CMS/HCC)   • Protein S deficiency (CMS/[facility])   • Personal history of tobacco use, presenting hazards to health   • History of DVT of lower extremity   • Multiple lung nodules on CT   • Long term (current) use of anticoagulants [Z79.01]         Current Outpatient Medications:   •  fluticasone (FLONASE) 50 MCG/ACT nasal spray, 2 sprays into the nostril(s) as directed by provider Daily. Till symptoms gone, Disp: 1 bottle, Rfl: 11  •   "oxymetazoline (AFRIN) 0.05 % nasal spray, 2 sprays into the nostril(s) as directed by provider As Needed., Disp: , Rfl:   •  pseudoephedrine (SUDAFED 12 HOUR) 120 MG 12 hr tablet, Take 1 tablet by mouth Every 12 (Twelve) Hours. (Patient taking differently: Take 30 mg by mouth As Needed.), Disp: 20 tablet, Rfl: 0  •  warfarin (COUMADIN) 7.5 MG tablet, Take 1 tab nightly or AS DIRECTED PER COUMADIN CLINIC, Disp: 35 tablet, Rfl: 1  •  albuterol sulfate  (90 Base) MCG/ACT inhaler, Inhale 2 puffs Every 4 (Four) Hours As Needed for Wheezing or Shortness of Air., Disp: 18 g, Rfl: 11    No Known Allergies    Past Medical History:   Diagnosis Date   • Dvt femoral (deep venous thrombosis) (CMS/[facility])      Past Surgical History:   Procedure Laterality Date   •  SECTION     • CHOLECYSTECTOMY       Social History     Socioeconomic History   • Marital status:      Spouse name: Not on file   • Number of children: Not on file   • Years of education: Not on file   • Highest education level: Not on file   Tobacco Use   • Smoking status: Former Smoker     Last attempt to quit: 2015     Years since quitting: 3.6   • Smokeless tobacco: Never Used   Substance and Sexual Activity   • Alcohol use: No     Comment: special occasions   • Drug use: No   • Sexual activity: Yes     Partners: Male     Birth control/protection: Surgical     Family History   Problem Relation Age of Onset   • Hypertension Mother    • Cancer Paternal Grandmother    • Heart disease Paternal Grandfather           Objective     Blood pressure (!) 165/102, pulse 75, height 162.6 cm (64\"), weight 109 kg (240 lb), SpO2 98 %.  Physical Exam   Constitutional: She is oriented to person, place, and time. Vital signs are normal. She appears well-developed and well-nourished.   Morbidly obese   HENT:   Head: Normocephalic and atraumatic.   Nose: Nose normal. No mucosal edema or rhinorrhea.   Mouth/Throat: Uvula is midline, oropharynx is clear and moist " and mucous membranes are normal.   Mallampati 2   Eyes: Conjunctivae, EOM and lids are normal. Pupils are equal, round, and reactive to light.   Eyeglasses   Neck: Trachea normal and normal range of motion. No tracheal tenderness present. No thyroid mass present.   Cardiovascular: Normal rate, regular rhythm and normal heart sounds. PMI is not displaced. Exam reveals no gallop.   No murmur heard.  Pulmonary/Chest: Effort normal and breath sounds normal. No respiratory distress. She has no decreased breath sounds. She has no wheezes. She has no rhonchi. Chest wall is not dull to percussion. She exhibits no tenderness.   Abdominal: Soft. Normal appearance and bowel sounds are normal. There is no hepatomegaly. There is no tenderness.   obese   Musculoskeletal:   Normal gait, no extremity edema     Vascular Status -  Her right foot exhibits no edema. Her left foot exhibits no edema.  Lymphadenopathy:        Head (right side): No submandibular adenopathy present.        Head (left side): No submandibular adenopathy present.     She has no cervical adenopathy.        Right: No supraclavicular adenopathy present.        Left: No supraclavicular adenopathy present.   Neurological: She is alert and oriented to person, place, and time.   Skin: Skin is warm and dry. No rash noted. No cyanosis. Nails show no clubbing.   Psychiatric: She has a normal mood and affect. Her speech is normal and behavior is normal. Judgment normal.   Nursing note and vitals reviewed.      PFTs: 1/23/19 (independently reviewed and interpreted by me)  Ratio 84  FVC 3.52/ 95%  FEV1 2.94/ 97%  TLC 3.88/ 76%  DLCO 20.33/ 83%  Normal spirometry and diffusing capacity.  No significant bronchodilator response.  Reduced TLC but normal FVC.  No comparative data available.    Radiology (independently reviewed and interpreted by me): CT chest with contrast 1/21/19 showed 1.2cm round nodule superior segment LLL with faint rim of calcification, 8mm round nodule  superior segment RLL with rim of calcification, 1.2 x 0.9cm ovoid nodule posterior RUL with possible rim of calcification, calcified right hilar node       Assessment/Plan     Leona was seen today for cough.    Diagnoses and all orders for this visit:    Cough    Multiple lung nodules on CT    Class 3 severe obesity due to excess calories without serious comorbidity with body mass index (BMI) of 40.0 to 44.9 in adult (CMS/[facility])         Discussion/ Recommendations:   Given that she is only used her albuterol a few times since last visit I do not feel the escalation to a long-acting bronchodilator is necessary.  Otherwise, her cough and URI have continued to improve and she is not having any significant issues at this time.  She is currently scheduled to have a follow-up CT chest for later in April so I have encouraged her to keep this as scheduled to follow-up on her lung nodules.    -Continue albuterol as needed for dyspnea or wheeze.    -If she has increased albuterol need 3 or more times a week, I would recommend escalating to an ICS.  -CT chest without contrast on 4/24/19.   -If she develops recurrent rhinitis, I would recommend starting Flonase and nasal saline.    Patient's Body mass index is 41.2 kg/m². BMI is above normal parameters. Recommendations include: exercise counseling.           Return in about 2 months (around 4/25/2019) for F/u CT chest.      Thank you for allowing me to participate in the care of Leona Cardoso. Please do not hesitate to contact me with any questions.         This document has been electronically signed by Lisa Bonilla MD on February 22, 2019 10:52 AM      Dictated using Dragon

## 2019-02-22 ENCOUNTER — ANTICOAGULATION VISIT (OUTPATIENT)
Dept: CARDIAC SURGERY | Facility: CLINIC | Age: 39
End: 2019-02-22

## 2019-02-22 ENCOUNTER — OFFICE VISIT (OUTPATIENT)
Dept: PULMONOLOGY | Facility: CLINIC | Age: 39
End: 2019-02-22

## 2019-02-22 VITALS
BODY MASS INDEX: 40.97 KG/M2 | DIASTOLIC BLOOD PRESSURE: 102 MMHG | HEIGHT: 64 IN | WEIGHT: 240 LBS | OXYGEN SATURATION: 98 % | SYSTOLIC BLOOD PRESSURE: 165 MMHG | HEART RATE: 75 BPM

## 2019-02-22 DIAGNOSIS — R91.8 MULTIPLE LUNG NODULES ON CT: ICD-10-CM

## 2019-02-22 DIAGNOSIS — Z79.01 LONG TERM (CURRENT) USE OF ANTICOAGULANTS: ICD-10-CM

## 2019-02-22 DIAGNOSIS — D68.59 PROTEIN S DEFICIENCY (HCC): ICD-10-CM

## 2019-02-22 DIAGNOSIS — Z86.718 HISTORY OF DVT OF LOWER EXTREMITY: ICD-10-CM

## 2019-02-22 DIAGNOSIS — R05.9 COUGH: Primary | ICD-10-CM

## 2019-02-22 DIAGNOSIS — E66.01 CLASS 3 SEVERE OBESITY DUE TO EXCESS CALORIES WITHOUT SERIOUS COMORBIDITY WITH BODY MASS INDEX (BMI) OF 40.0 TO 44.9 IN ADULT (HCC): ICD-10-CM

## 2019-02-22 LAB — INR PPP: 3 (ref 0.9–1.1)

## 2019-02-22 PROCEDURE — 99213 OFFICE O/P EST LOW 20 MIN: CPT | Performed by: INTERNAL MEDICINE

## 2019-02-22 PROCEDURE — 85610 PROTHROMBIN TIME: CPT | Performed by: NURSE PRACTITIONER

## 2019-02-22 NOTE — PROGRESS NOTES
Today's INR is 3.0. Pt seeing Dr Bonilla today.  Pt denies med changes or bleeding problems. Pt states she has not eaten green veggies in about a week. Dose adjusted and pt instructed to have a serving of green veggies today and again in 4 days; pt verbalized. Patient instructed regarding medication; results given and questions answered. Nutritional counseling given.  Dietary factors affecting therapy addressed.  Patient instructed to monitor for excessive bruising or bleeding. Will recheck next week.         This document has been electronically signed by NARDA Guardado @ on February 22, 2019 10:53 AM

## 2019-02-28 ENCOUNTER — ANTICOAGULATION VISIT (OUTPATIENT)
Dept: CARDIAC SURGERY | Facility: CLINIC | Age: 39
End: 2019-02-28

## 2019-02-28 VITALS — DIASTOLIC BLOOD PRESSURE: 79 MMHG | OXYGEN SATURATION: 96 % | HEART RATE: 98 BPM | SYSTOLIC BLOOD PRESSURE: 141 MMHG

## 2019-02-28 DIAGNOSIS — D68.59 PROTEIN S DEFICIENCY (HCC): ICD-10-CM

## 2019-02-28 DIAGNOSIS — Z79.01 LONG TERM (CURRENT) USE OF ANTICOAGULANTS: ICD-10-CM

## 2019-02-28 DIAGNOSIS — Z86.718 HISTORY OF DVT OF LOWER EXTREMITY: ICD-10-CM

## 2019-02-28 LAB — INR PPP: 1.9 (ref 0.9–1.1)

## 2019-02-28 PROCEDURE — 85610 PROTHROMBIN TIME: CPT | Performed by: NURSE PRACTITIONER

## 2019-02-28 NOTE — PROGRESS NOTES
Today's INR is 1.9.  Pt denies med changes or bleeding problems. Pt states she accidentally missed her coumadin dose on Friday but had a glass of wine that evening. Due to missed pill dose not adjusted at this time. Pt instructed to have 1 serving of green veggies on Sunday and appt made for next week; pt verbalized. Patient instructed regarding medication; results given and questions answered. Nutritional counseling given.  Dietary factors affecting therapy addressed.  Patient instructed to monitor for excessive bruising or bleeding.         This document has been electronically signed by Radha Hinton, NARDA @ on February 28, 2019 1:09 PM

## 2019-03-07 ENCOUNTER — ANTICOAGULATION VISIT (OUTPATIENT)
Dept: CARDIAC SURGERY | Facility: CLINIC | Age: 39
End: 2019-03-07

## 2019-03-07 VITALS — HEART RATE: 72 BPM | DIASTOLIC BLOOD PRESSURE: 88 MMHG | SYSTOLIC BLOOD PRESSURE: 142 MMHG

## 2019-03-07 DIAGNOSIS — D68.59 PROTEIN S DEFICIENCY (HCC): ICD-10-CM

## 2019-03-07 DIAGNOSIS — Z79.01 LONG TERM (CURRENT) USE OF ANTICOAGULANTS: ICD-10-CM

## 2019-03-07 DIAGNOSIS — Z86.718 HISTORY OF DVT OF LOWER EXTREMITY: ICD-10-CM

## 2019-03-07 LAB — INR PPP: 1.8 (ref 0.9–1.1)

## 2019-03-07 PROCEDURE — 85610 PROTHROMBIN TIME: CPT | Performed by: NURSE PRACTITIONER

## 2019-03-07 PROCEDURE — 99211 OFF/OP EST MAY X REQ PHY/QHP: CPT | Performed by: NURSE PRACTITIONER

## 2019-03-07 NOTE — PROGRESS NOTES
Today's INR is 1.8.  Pt states she accidentally took 7.5mg last Friday and she only had a serving of green veggies once since last visit. Dose adjusted and pt instructed to hold green veggies 2-3 days; pt verbalized. Patient instructed regarding medication; results given and questions answered. Nutritional counseling given.  Dietary factors affecting therapy addressed.  Patient instructed to monitor for excessive bruising or bleeding. Will recheck next week.        This document has been electronically signed by NARDA Guardado @ on March 7, 2019 1:43 PM

## 2019-03-11 ENCOUNTER — APPOINTMENT (OUTPATIENT)
Dept: LAB | Facility: HOSPITAL | Age: 39
End: 2019-03-11

## 2019-03-11 ENCOUNTER — OFFICE VISIT (OUTPATIENT)
Dept: OBSTETRICS AND GYNECOLOGY | Facility: CLINIC | Age: 39
End: 2019-03-11

## 2019-03-11 VITALS — BODY MASS INDEX: 41.32 KG/M2 | WEIGHT: 242 LBS | HEIGHT: 64 IN

## 2019-03-11 DIAGNOSIS — Z01.419 WELL WOMAN EXAM WITH ROUTINE GYNECOLOGICAL EXAM: Primary | ICD-10-CM

## 2019-03-11 DIAGNOSIS — J30.2 SEASONAL ALLERGIC RHINITIS, UNSPECIFIED TRIGGER: ICD-10-CM

## 2019-03-11 DIAGNOSIS — N95.1 PERIMENOPAUSAL VASOMOTOR SYMPTOMS: ICD-10-CM

## 2019-03-11 DIAGNOSIS — H65.02 ACUTE SEROUS OTITIS MEDIA OF LEFT EAR, RECURRENCE NOT SPECIFIED: ICD-10-CM

## 2019-03-11 LAB
25(OH)D3 SERPL-MCNC: 27 NG/ML (ref 30–100)
ARTICHOKE IGE QN: 79 MG/DL (ref 1–129)
CHOLEST SERPL-MCNC: 158 MG/DL (ref 0–199)
HDLC SERPL-MCNC: 41 MG/DL (ref 60–200)
LDLC/HDLC SERPL: 1.76 {RATIO} (ref 0–3.22)
TRIGL SERPL-MCNC: 224 MG/DL (ref 20–199)
TSH SERPL DL<=0.05 MIU/L-ACNC: 1.45 MIU/ML (ref 0.46–4.68)

## 2019-03-11 PROCEDURE — 82306 VITAMIN D 25 HYDROXY: CPT | Performed by: NURSE PRACTITIONER

## 2019-03-11 PROCEDURE — 84443 ASSAY THYROID STIM HORMONE: CPT | Performed by: NURSE PRACTITIONER

## 2019-03-11 PROCEDURE — 82670 ASSAY OF TOTAL ESTRADIOL: CPT | Performed by: NURSE PRACTITIONER

## 2019-03-11 PROCEDURE — 84144 ASSAY OF PROGESTERONE: CPT | Performed by: NURSE PRACTITIONER

## 2019-03-11 PROCEDURE — 87624 HPV HI-RISK TYP POOLED RSLT: CPT | Performed by: NURSE PRACTITIONER

## 2019-03-11 PROCEDURE — 83002 ASSAY OF GONADOTROPIN (LH): CPT | Performed by: NURSE PRACTITIONER

## 2019-03-11 PROCEDURE — 36415 COLL VENOUS BLD VENIPUNCTURE: CPT | Performed by: NURSE PRACTITIONER

## 2019-03-11 PROCEDURE — G0123 SCREEN CERV/VAG THIN LAYER: HCPCS | Performed by: NURSE PRACTITIONER

## 2019-03-11 PROCEDURE — 83001 ASSAY OF GONADOTROPIN (FSH): CPT | Performed by: NURSE PRACTITIONER

## 2019-03-11 PROCEDURE — 83036 HEMOGLOBIN GLYCOSYLATED A1C: CPT | Performed by: NURSE PRACTITIONER

## 2019-03-11 PROCEDURE — 80061 LIPID PANEL: CPT | Performed by: NURSE PRACTITIONER

## 2019-03-11 RX ORDER — BUSPIRONE HYDROCHLORIDE 5 MG/1
5 TABLET ORAL 3 TIMES DAILY PRN
Qty: 90 TABLET | Refills: 3 | Status: SHIPPED | OUTPATIENT
Start: 2019-03-11 | End: 2021-04-09 | Stop reason: HOSPADM

## 2019-03-11 RX ORDER — LORATADINE 10 MG/1
10 TABLET ORAL DAILY
Qty: 30 TABLET | Refills: 11 | Status: SHIPPED | OUTPATIENT
Start: 2019-03-11 | End: 2019-05-07

## 2019-03-11 RX ORDER — AMOXICILLIN AND CLAVULANATE POTASSIUM 875; 125 MG/1; MG/1
1 TABLET, FILM COATED ORAL EVERY 12 HOURS
Qty: 14 TABLET | Refills: 0 | Status: SHIPPED | OUTPATIENT
Start: 2019-03-11 | End: 2019-03-18

## 2019-03-12 LAB
ESTRADIOL SERPL HS-MCNC: 47.8 PG/ML
HBA1C MFR BLD: 5.3 % (ref 4–5.6)
PROGEST SERPL-MCNC: <0.1 NG/ML

## 2019-03-13 LAB
GEN CATEG CVX/VAG CYTO-IMP: NORMAL
LAB AP CASE REPORT: NORMAL
LAB AP GYN ADDITIONAL INFORMATION: NORMAL
LAB AP GYN OTHER FINDINGS: NORMAL
PATH INTERP SPEC-IMP: NORMAL
STAT OF ADQ CVX/VAG CYTO-IMP: NORMAL

## 2019-03-14 LAB
FSH SERPL-ACNC: 7.1 MIU/ML
LH SERPL-ACNC: 4.03 MIU/ML

## 2019-03-15 ENCOUNTER — ANTICOAGULATION VISIT (OUTPATIENT)
Dept: CARDIAC SURGERY | Facility: CLINIC | Age: 39
End: 2019-03-15

## 2019-03-15 VITALS — SYSTOLIC BLOOD PRESSURE: 156 MMHG | HEART RATE: 88 BPM | DIASTOLIC BLOOD PRESSURE: 96 MMHG

## 2019-03-15 DIAGNOSIS — Z86.718 HISTORY OF DVT OF LOWER EXTREMITY: ICD-10-CM

## 2019-03-15 DIAGNOSIS — Z79.01 LONG TERM (CURRENT) USE OF ANTICOAGULANTS: ICD-10-CM

## 2019-03-15 DIAGNOSIS — D68.59 PROTEIN S DEFICIENCY (HCC): ICD-10-CM

## 2019-03-15 LAB
HPV I/H RISK 4 DNA CVX QL PROBE+SIG AMP: NEGATIVE
INR PPP: 2.7 (ref 0.9–1.1)

## 2019-03-15 PROCEDURE — 99211 OFF/OP EST MAY X REQ PHY/QHP: CPT | Performed by: NURSE PRACTITIONER

## 2019-03-15 PROCEDURE — 85610 PROTHROMBIN TIME: CPT | Performed by: NURSE PRACTITIONER

## 2019-03-15 NOTE — PROGRESS NOTES
Today's INR is 2.7. PT states that she will finish Augmentin on Monday am. PT is also on Buspar, Claritin, and Evening Primrose none of which interfere with Coumadin. Denies any s/s of blood clot. Adjusted pt's dose due to AB and instructed to increase vit k today. Recheck INR on Tuesday. Patient instructed regarding medication; results given and questions answered. Nutritional counseling given.  Dietary factors affecting therapy addressed.  Patient instructed to monitor for excessive bruising or bleeding. PT verbalizes understanding.         This document has been electronically signed by Radha Hinton, NARDA @ on March 15, 2019 2:04 PM

## 2019-03-18 RX ORDER — ERGOCALCIFEROL 1.25 MG/1
50000 CAPSULE ORAL WEEKLY
Qty: 5 CAPSULE | Refills: 0 | Status: SHIPPED | OUTPATIENT
Start: 2019-03-18 | End: 2019-04-16

## 2019-03-19 ENCOUNTER — ANTICOAGULATION VISIT (OUTPATIENT)
Dept: CARDIAC SURGERY | Facility: CLINIC | Age: 39
End: 2019-03-19

## 2019-03-19 VITALS — OXYGEN SATURATION: 99 % | DIASTOLIC BLOOD PRESSURE: 77 MMHG | HEART RATE: 93 BPM | SYSTOLIC BLOOD PRESSURE: 124 MMHG

## 2019-03-19 DIAGNOSIS — Z79.01 LONG TERM (CURRENT) USE OF ANTICOAGULANTS: ICD-10-CM

## 2019-03-19 DIAGNOSIS — Z86.718 HISTORY OF DVT OF LOWER EXTREMITY: ICD-10-CM

## 2019-03-19 DIAGNOSIS — D68.59 PROTEIN S DEFICIENCY (HCC): ICD-10-CM

## 2019-03-19 LAB — INR PPP: 2.5 (ref 0.9–1.1)

## 2019-03-19 PROCEDURE — 85610 PROTHROMBIN TIME: CPT | Performed by: NURSE PRACTITIONER

## 2019-03-19 PROCEDURE — 99211 OFF/OP EST MAY X REQ PHY/QHP: CPT | Performed by: NURSE PRACTITIONER

## 2019-03-19 NOTE — PROGRESS NOTES
Today's INR is 2.5.  Pt finished Augmentin yesterday and denies other med changes or bleeding problems. Dose slightly adjusted due to pt finishing antibiotic. Pt instructed to have a serving of green veggies Wednesday and Sunday (every 4th day); pt verbalized. Patient instructed regarding medication; results given and questions answered. Nutritional counseling given.  Dietary factors affecting therapy addressed.  Patient instructed to monitor for excessive bruising or bleeding. Will recheck next week.        This document has been electronically signed by Radha Hinton, NARDA @ on March 19, 2019 1:57 PM

## 2019-03-25 ENCOUNTER — TELEPHONE (OUTPATIENT)
Dept: FAMILY MEDICINE CLINIC | Facility: CLINIC | Age: 39
End: 2019-03-25

## 2019-03-25 ENCOUNTER — OFFICE VISIT (OUTPATIENT)
Dept: FAMILY MEDICINE CLINIC | Facility: CLINIC | Age: 39
End: 2019-03-25

## 2019-03-25 VITALS
BODY MASS INDEX: 41.32 KG/M2 | HEART RATE: 113 BPM | SYSTOLIC BLOOD PRESSURE: 140 MMHG | DIASTOLIC BLOOD PRESSURE: 90 MMHG | HEIGHT: 64 IN | OXYGEN SATURATION: 98 % | WEIGHT: 242 LBS

## 2019-03-25 DIAGNOSIS — R10.9 FLANK PAIN: ICD-10-CM

## 2019-03-25 DIAGNOSIS — R30.0 DYSURIA: Primary | ICD-10-CM

## 2019-03-25 LAB
BILIRUB BLD-MCNC: NEGATIVE MG/DL
CLARITY, POC: CLEAR
COLOR UR: YELLOW
GLUCOSE UR STRIP-MCNC: NEGATIVE MG/DL
KETONES UR QL: NEGATIVE
LEUKOCYTE EST, POC: NEGATIVE
NITRITE UR-MCNC: NEGATIVE MG/ML
PH UR: 6 [PH] (ref 5–8)
PROT UR STRIP-MCNC: NEGATIVE MG/DL
RBC # UR STRIP: ABNORMAL /UL
SP GR UR: 1.02 (ref 1–1.03)
UROBILINOGEN UR QL: NORMAL

## 2019-03-25 PROCEDURE — 96372 THER/PROPH/DIAG INJ SC/IM: CPT | Performed by: FAMILY MEDICINE

## 2019-03-25 PROCEDURE — 99213 OFFICE O/P EST LOW 20 MIN: CPT | Performed by: FAMILY MEDICINE

## 2019-03-25 RX ORDER — ONDANSETRON 4 MG/1
4 TABLET, FILM COATED ORAL EVERY 8 HOURS PRN
Qty: 30 TABLET | Refills: 0 | Status: SHIPPED | OUTPATIENT
Start: 2019-03-25 | End: 2019-05-07

## 2019-03-25 RX ORDER — TAMSULOSIN HYDROCHLORIDE 0.4 MG/1
1 CAPSULE ORAL NIGHTLY
Qty: 30 CAPSULE | Refills: 0 | Status: SHIPPED | OUTPATIENT
Start: 2019-03-25 | End: 2019-04-26

## 2019-03-25 RX ORDER — KETOROLAC TROMETHAMINE 30 MG/ML
30 INJECTION, SOLUTION INTRAMUSCULAR; INTRAVENOUS ONCE
Status: COMPLETED | OUTPATIENT
Start: 2019-03-25 | End: 2019-03-25

## 2019-03-25 RX ORDER — KETOROLAC TROMETHAMINE 10 MG/1
10 TABLET, FILM COATED ORAL EVERY 6 HOURS PRN
Qty: 30 TABLET | Refills: 0 | Status: SHIPPED | OUTPATIENT
Start: 2019-03-25 | End: 2019-05-07

## 2019-03-25 RX ADMIN — KETOROLAC TROMETHAMINE 30 MG: 30 INJECTION, SOLUTION INTRAMUSCULAR; INTRAVENOUS at 11:29

## 2019-03-25 NOTE — TELEPHONE ENCOUNTER
Patient called and said that she was able to find the strainer that provider told her she needed but can not afford it. Wants to know if provider can prescribe it to her so that the insurance will cover it ?    Contact patient at   [phone]

## 2019-03-25 NOTE — PROGRESS NOTES
Subjective:     Leona Cardoso is a 38 y.o. female pt of Dr. Neymar Esparza (Pt is not establishing) who presents for initial evaluation for 1 day history suprapubic pressure like pain. Pt reports left sided flank pain, CVA pain and prior history of nephrolithiasis. Pt denies fever, chils, blood or discharge. Pt agreeable to UA in office which showed no sign of UTI but was positive for hematuria. Pt agreeable to toradol shot, starting flomax, US imaging and straining urine for stones.    Preventative:  Over the past 2 weeks, have you felt down, depressed, or hopeless?No   Over the past 2 weeks, have you felt little interest or pleasure in doing things?No  Clinical depression screening refused by patient.No     Past Medical Hx:  Past Medical History:   Diagnosis Date   • Dvt femoral (deep venous thrombosis) (CMS/HCC)        Past Surgical Hx:  Past Surgical History:   Procedure Laterality Date   •  SECTION     • CHOLECYSTECTOMY         Health Maintenance:  Health Maintenance   Topic Date Due   • TDAP/TD VACCINES (1 - Tdap) 2023 (Originally 1998)   • ANNUAL PHYSICAL  2025 (Originally 2018)   • PAP SMEAR  2022   • INFLUENZA VACCINE  Addressed       Current Meds:    Current Outpatient Medications:   •  albuterol sulfate  (90 Base) MCG/ACT inhaler, Inhale 2 puffs Every 4 (Four) Hours As Needed for Wheezing or Shortness of Air., Disp: 18 g, Rfl: 11  •  busPIRone (BUSPAR) 5 MG tablet, Take 1 tablet by mouth 3 (Three) Times a Day As Needed (Anxiety)., Disp: 90 tablet, Rfl: 3  •  ergocalciferol (ERGOCALCIFEROL) 04671 units capsule, Take 1 capsule by mouth 1 (One) Time Per Week for 5 doses., Disp: 5 capsule, Rfl: 0  •  fluticasone (FLONASE) 50 MCG/ACT nasal spray, 2 sprays into the nostril(s) as directed by provider Daily. Till symptoms gone, Disp: 1 bottle, Rfl: 11  •  loratadine (CLARITIN) 10 MG tablet, Take 1 tablet by mouth Daily., Disp: 30 tablet, Rfl: 11  •  oxymetazoline  (AFRIN) 0.05 % nasal spray, 2 sprays into the nostril(s) as directed by provider As Needed., Disp: , Rfl:   •  pseudoephedrine (SUDAFED 12 HOUR) 120 MG 12 hr tablet, Take 1 tablet by mouth Every 12 (Twelve) Hours. (Patient taking differently: Take 30 mg by mouth As Needed.), Disp: 20 tablet, Rfl: 0  •  warfarin (COUMADIN) 7.5 MG tablet, Take 1 tab nightly or AS DIRECTED PER COUMADIN CLINIC, Disp: 35 tablet, Rfl: 1  •  ketorolac (TORADOL) 10 MG tablet, Take 1 tablet by mouth Every 6 (Six) Hours As Needed for Moderate Pain ., Disp: 30 tablet, Rfl: 0  •  ondansetron (ZOFRAN) 4 MG tablet, Take 1 tablet by mouth Every 8 (Eight) Hours As Needed for Nausea or Vomiting., Disp: 30 tablet, Rfl: 0  •  tamsulosin (FLOMAX) 0.4 MG capsule 24 hr capsule, Take 1 capsule by mouth Every Night., Disp: 30 capsule, Rfl: 0    Allergies:  Patient has no known allergies.    Family Hx:  Family History   Problem Relation Age of Onset   • Hypertension Mother    • Cancer Paternal Grandmother    • Heart disease Paternal Grandfather         Social History:  Social History     Socioeconomic History   • Marital status:      Spouse name: Not on file   • Number of children: Not on file   • Years of education: Not on file   • Highest education level: Not on file   Tobacco Use   • Smoking status: Former Smoker     Last attempt to quit: 7/9/2015     Years since quitting: 3.7   • Smokeless tobacco: Never Used   Substance and Sexual Activity   • Alcohol use: No     Comment: special occasions   • Drug use: No   • Sexual activity: Yes     Partners: Male     Birth control/protection: Surgical       Review of Systems  Review of Systems   Constitutional: Negative for chills and fever.   HENT: Negative for congestion and sore throat.    Eyes: Negative for discharge and itching.   Respiratory: Negative for cough, shortness of breath and wheezing.    Cardiovascular: Negative for chest pain and palpitations.   Gastrointestinal: Positive for abdominal pain  "(suprapubic) and nausea. Negative for diarrhea and vomiting.   Genitourinary: Positive for dysuria and flank pain. Negative for hematuria.   Musculoskeletal: Negative for neck pain and neck stiffness.   Skin: Negative for rash and wound.   Neurological: Negative for seizures and syncope.   Psychiatric/Behavioral: Negative for agitation and confusion.         Objective:     /90   Pulse 113   Ht 162.6 cm (64\")   Wt 110 kg (242 lb)   LMP 03/07/2019   SpO2 98%   BMI 41.54 kg/m²   Physical Exam   Constitutional: She is oriented to person, place, and time. She appears well-developed and well-nourished. No distress.   HENT:   Head: Normocephalic and atraumatic.   Right Ear: External ear normal.   Left Ear: External ear normal.   Nose: Nose normal.   Eyes: Conjunctivae are normal. Right eye exhibits no discharge. Left eye exhibits no discharge. No scleral icterus.   Neck: Normal range of motion. Neck supple.   Cardiovascular: Regular rhythm and normal heart sounds. Tachycardia present.   Pulmonary/Chest: Effort normal and breath sounds normal. No stridor. No respiratory distress. She has no wheezes. She has no rales.   Abdominal: Soft. Bowel sounds are normal. She exhibits no distension. There is tenderness in the suprapubic area. There is CVA tenderness. There is no rigidity, no rebound and no guarding.   Musculoskeletal: Normal range of motion. She exhibits no edema.   Neurological: She is alert and oriented to person, place, and time.   Skin: Skin is warm. No rash noted. She is not diaphoretic. No erythema.   Psychiatric: She has a normal mood and affect. Her behavior is normal. Judgment and thought content normal.   Nursing note and vitals reviewed.                                                                     Assessment/Plan:     Diagnoses and all orders for this visit:    Dysuria  -     POCT urinalysis dipstick, automated    Flank pain  -     ketorolac (TORADOL) injection 30 mg  -     Cancel: US " Renal Bilateral  -     US Abdomen Complete    Other orders  -     ondansetron (ZOFRAN) 4 MG tablet; Take 1 tablet by mouth Every 8 (Eight) Hours As Needed for Nausea or Vomiting.  -     tamsulosin (FLOMAX) 0.4 MG capsule 24 hr capsule; Take 1 capsule by mouth Every Night.  -     ketorolac (TORADOL) 10 MG tablet; Take 1 tablet by mouth Every 6 (Six) Hours As Needed for Moderate Pain .         Follow-up:     Return for Next scheduled follow up.        GOALS:  Maintain medication compliance    Preventative:  Female Preventative: Exercises regularly  Vaccines:   Tetanus vaccine: not up to date - declined  Annual influenza vaccine: not up to date - declined     former smoker  does not drink  eat more fruits and vegetables, decrease soda or juice intake, increase water intake and increase physical activity    RISK SCORE: 3    Preet Ortiz MD PGY3  [place] [address]  Office: [phone]      This document has been electronically signed by Preet Ortiz MD on March 26, 2019 3:36 PM

## 2019-03-26 NOTE — PROGRESS NOTES
I have seen the patient.  I have reviewed the notes, assessments, and/or procedures performed by Dr Ortiz, I concur with her/his documentation and assessment and plan for Leona Cardoso.               This document has been electronically signed by Len Traylor MD on March 26, 2019 4:31 PM

## 2019-03-28 ENCOUNTER — ANTICOAGULATION VISIT (OUTPATIENT)
Dept: CARDIAC SURGERY | Facility: CLINIC | Age: 39
End: 2019-03-28

## 2019-03-28 ENCOUNTER — TELEPHONE (OUTPATIENT)
Dept: FAMILY MEDICINE CLINIC | Facility: CLINIC | Age: 39
End: 2019-03-28

## 2019-03-28 VITALS — DIASTOLIC BLOOD PRESSURE: 96 MMHG | HEART RATE: 72 BPM | SYSTOLIC BLOOD PRESSURE: 138 MMHG

## 2019-03-28 DIAGNOSIS — D68.59 PROTEIN S DEFICIENCY (HCC): ICD-10-CM

## 2019-03-28 DIAGNOSIS — Z79.01 LONG TERM (CURRENT) USE OF ANTICOAGULANTS: ICD-10-CM

## 2019-03-28 DIAGNOSIS — Z86.718 HISTORY OF DVT OF LOWER EXTREMITY: ICD-10-CM

## 2019-03-28 LAB — INR PPP: 1.8 (ref 0.9–1.1)

## 2019-03-28 PROCEDURE — 85610 PROTHROMBIN TIME: CPT | Performed by: NURSE PRACTITIONER

## 2019-03-28 PROCEDURE — 99211 OFF/OP EST MAY X REQ PHY/QHP: CPT | Performed by: NURSE PRACTITIONER

## 2019-03-28 NOTE — TELEPHONE ENCOUNTER
Pt called and was wanting to know if Dr. Ortiz would call her back if he had got the Ultrasound results in? She would like a call back please.      Thank you,      Velia

## 2019-03-28 NOTE — PROGRESS NOTES
Today's INR is 1.8. PT is on Flomax. PT denies any missed doses or excessive k. Adjusted pt's dose and instructed to hold green vegs for 2 days. Recheck INR next week. PT denies any bleeding issues. Patient instructed regarding medication; results given and questions answered. Nutritional counseling given.  Dietary factors affecting therapy addressed.  Patient instructed to monitor for excessive bruising or bleeding. PT verbalizes understanding.

## 2019-03-29 ENCOUNTER — TELEPHONE (OUTPATIENT)
Dept: FAMILY MEDICINE CLINIC | Facility: CLINIC | Age: 39
End: 2019-03-29

## 2019-03-29 NOTE — TELEPHONE ENCOUNTER
Patient called to see about the results of her ultrasound.  Patient would like a call back when possible with that information.     Call patient back at   [phone]

## 2019-04-01 ENCOUNTER — TELEPHONE (OUTPATIENT)
Dept: FAMILY MEDICINE CLINIC | Facility: CLINIC | Age: 39
End: 2019-04-01

## 2019-04-01 NOTE — TELEPHONE ENCOUNTER
----- Message from Preet Ortiz MD sent at 3/29/2019  1:43 PM CDT -----  Results of US are normal. Please contact pt and let her know.

## 2019-04-05 ENCOUNTER — ANTICOAGULATION VISIT (OUTPATIENT)
Dept: CARDIAC SURGERY | Facility: CLINIC | Age: 39
End: 2019-04-05

## 2019-04-05 VITALS — DIASTOLIC BLOOD PRESSURE: 98 MMHG | HEART RATE: 68 BPM | SYSTOLIC BLOOD PRESSURE: 164 MMHG

## 2019-04-05 DIAGNOSIS — Z79.01 LONG TERM (CURRENT) USE OF ANTICOAGULANTS: ICD-10-CM

## 2019-04-05 DIAGNOSIS — D68.59 PROTEIN S DEFICIENCY (HCC): ICD-10-CM

## 2019-04-05 DIAGNOSIS — Z86.718 HISTORY OF DVT OF LOWER EXTREMITY: ICD-10-CM

## 2019-04-05 LAB — INR PPP: 2.7 (ref 0.9–1.1)

## 2019-04-05 PROCEDURE — 85610 PROTHROMBIN TIME: CPT | Performed by: NURSE PRACTITIONER

## 2019-04-05 NOTE — PROGRESS NOTES
Today's INR is 2.7. Denies any new medications or bleeding issues. PT denies any s/s of blood clot. PT instructed to continue higher Coumadin dose but increase vit k to 2x per week instead of 1. Patient instructed regarding medication; results given and questions answered. Nutritional counseling given.  Dietary factors affecting therapy addressed.  Patient instructed to monitor for excessive bruising or bleeding. PT verbalizes understanding and will be seen next week.       This document has been electronically signed by SAIDA Delvalle-BC @  On April 5, 2019 2:56 PM

## 2019-04-11 ENCOUNTER — ANTICOAGULATION VISIT (OUTPATIENT)
Dept: CARDIAC SURGERY | Facility: CLINIC | Age: 39
End: 2019-04-11

## 2019-04-11 VITALS — HEART RATE: 84 BPM

## 2019-04-11 DIAGNOSIS — D68.59 PROTEIN S DEFICIENCY (HCC): ICD-10-CM

## 2019-04-11 DIAGNOSIS — Z86.718 HISTORY OF DVT OF LOWER EXTREMITY: ICD-10-CM

## 2019-04-11 DIAGNOSIS — Z79.01 LONG TERM (CURRENT) USE OF ANTICOAGULANTS: ICD-10-CM

## 2019-04-11 LAB — INR PPP: 2.7 (ref 0.9–1.1)

## 2019-04-11 PROCEDURE — 85610 PROTHROMBIN TIME: CPT | Performed by: NURSE PRACTITIONER

## 2019-04-11 NOTE — PROGRESS NOTES
Today's INR is 2.7. Denies any new medications or bleeding issues. PT denies any s/s of blood clot. PT instructed to continue same dose and Coumadin friendly diet. PT will be seen in 2 weeks. Patient instructed regarding medication; results given and questions answered. Nutritional counseling given.  Dietary factors affecting therapy addressed.  Patient instructed to monitor for excessive bruising or bleeding. PT verbalizes understanding.         This document has been electronically signed by Radha Hinton, NARDA @ on April 11, 2019 1:31 PM

## 2019-04-17 RX ORDER — WARFARIN SODIUM 7.5 MG/1
TABLET ORAL
Qty: 45 TABLET | Refills: 1 | Status: SHIPPED | OUTPATIENT
Start: 2019-04-17 | End: 2019-07-15 | Stop reason: SDUPTHER

## 2019-04-23 ENCOUNTER — LAB (OUTPATIENT)
Dept: ONCOLOGY | Facility: HOSPITAL | Age: 39
End: 2019-04-23

## 2019-04-23 ENCOUNTER — OFFICE VISIT (OUTPATIENT)
Dept: ONCOLOGY | Facility: CLINIC | Age: 39
End: 2019-04-23

## 2019-04-23 VITALS
BODY MASS INDEX: 41.69 KG/M2 | HEIGHT: 64 IN | RESPIRATION RATE: 18 BRPM | DIASTOLIC BLOOD PRESSURE: 97 MMHG | SYSTOLIC BLOOD PRESSURE: 159 MMHG | HEART RATE: 68 BPM | WEIGHT: 244.2 LBS | OXYGEN SATURATION: 99 %

## 2019-04-23 DIAGNOSIS — Z86.718 HISTORY OF DVT OF LOWER EXTREMITY: ICD-10-CM

## 2019-04-23 DIAGNOSIS — R91.8 MULTIPLE LUNG NODULES ON CT: ICD-10-CM

## 2019-04-23 DIAGNOSIS — D68.59 PROTEIN S DEFICIENCY (HCC): Primary | Chronic | ICD-10-CM

## 2019-04-23 DIAGNOSIS — Z79.01 LONG TERM (CURRENT) USE OF ANTICOAGULANTS: ICD-10-CM

## 2019-04-23 LAB
ALBUMIN SERPL-MCNC: 4 G/DL (ref 3.5–5.2)
ALBUMIN/GLOB SERPL: 1.2 G/DL
ALP SERPL-CCNC: 81 U/L (ref 39–117)
ALT SERPL W P-5'-P-CCNC: 16 U/L (ref 1–33)
ANION GAP SERPL CALCULATED.3IONS-SCNC: 16 MMOL/L
AST SERPL-CCNC: 17 U/L (ref 1–32)
BASOPHILS # BLD AUTO: 0.05 10*3/MM3 (ref 0–0.2)
BASOPHILS NFR BLD AUTO: 0.7 % (ref 0–1.5)
BILIRUB SERPL-MCNC: 0.3 MG/DL (ref 0.2–1.2)
BUN BLD-MCNC: 14 MG/DL (ref 6–20)
BUN/CREAT SERPL: 20 (ref 7–25)
CALCIUM SPEC-SCNC: 9 MG/DL (ref 8.6–10.5)
CHLORIDE SERPL-SCNC: 99 MMOL/L (ref 98–107)
CO2 SERPL-SCNC: 24 MMOL/L (ref 22–29)
CREAT BLD-MCNC: 0.7 MG/DL (ref 0.57–1)
DEPRECATED RDW RBC AUTO: 37.7 FL (ref 37–54)
EOSINOPHIL # BLD AUTO: 0.34 10*3/MM3 (ref 0–0.4)
EOSINOPHIL NFR BLD AUTO: 4.8 % (ref 0.3–6.2)
ERYTHROCYTE [DISTWIDTH] IN BLOOD BY AUTOMATED COUNT: 12.6 % (ref 12.3–15.4)
GFR SERPL CREATININE-BSD FRML MDRD: 93 ML/MIN/1.73
GLOBULIN UR ELPH-MCNC: 3.4 GM/DL
GLUCOSE BLD-MCNC: 112 MG/DL (ref 65–99)
HCT VFR BLD AUTO: 43.1 % (ref 34–46.6)
HGB BLD-MCNC: 14.3 G/DL (ref 12–15.9)
IMM GRANULOCYTES # BLD AUTO: 0.03 10*3/MM3 (ref 0–0.05)
IMM GRANULOCYTES NFR BLD AUTO: 0.4 % (ref 0–0.5)
LYMPHOCYTES # BLD AUTO: 2.02 10*3/MM3 (ref 0.7–3.1)
LYMPHOCYTES NFR BLD AUTO: 28.4 % (ref 19.6–45.3)
MCH RBC QN AUTO: 27.2 PG (ref 26.6–33)
MCHC RBC AUTO-ENTMCNC: 33.2 G/DL (ref 31.5–35.7)
MCV RBC AUTO: 82.1 FL (ref 79–97)
MONOCYTES # BLD AUTO: 0.53 10*3/MM3 (ref 0.1–0.9)
MONOCYTES NFR BLD AUTO: 7.4 % (ref 5–12)
NEUTROPHILS # BLD AUTO: 4.15 10*3/MM3 (ref 1.7–7)
NEUTROPHILS NFR BLD AUTO: 58.3 % (ref 42.7–76)
NRBC BLD AUTO-RTO: 0 /100 WBC (ref 0–0.2)
PLATELET # BLD AUTO: 277 10*3/MM3 (ref 140–450)
PMV BLD AUTO: 9.1 FL (ref 6–12)
POTASSIUM BLD-SCNC: 3.9 MMOL/L (ref 3.5–5.2)
PROT SERPL-MCNC: 7.4 G/DL (ref 6–8.5)
RBC # BLD AUTO: 5.25 10*6/MM3 (ref 3.77–5.28)
SODIUM BLD-SCNC: 139 MMOL/L (ref 136–145)
WBC NRBC COR # BLD: 7.12 10*3/MM3 (ref 3.4–10.8)

## 2019-04-23 PROCEDURE — 85025 COMPLETE CBC W/AUTO DIFF WBC: CPT | Performed by: INTERNAL MEDICINE

## 2019-04-23 PROCEDURE — G8417 CALC BMI ABV UP PARAM F/U: HCPCS | Performed by: INTERNAL MEDICINE

## 2019-04-23 PROCEDURE — 36415 COLL VENOUS BLD VENIPUNCTURE: CPT | Performed by: INTERNAL MEDICINE

## 2019-04-23 PROCEDURE — 80053 COMPREHEN METABOLIC PANEL: CPT | Performed by: INTERNAL MEDICINE

## 2019-04-23 PROCEDURE — 99214 OFFICE O/P EST MOD 30 MIN: CPT | Performed by: INTERNAL MEDICINE

## 2019-04-23 PROCEDURE — 1123F ACP DISCUSS/DSCN MKR DOCD: CPT | Performed by: INTERNAL MEDICINE

## 2019-04-23 NOTE — PROGRESS NOTES
DATE OF VISIT: 2019      REASON FOR VISIT:  History of right lower extremity DVT and protein S deficiency ,lung nodules      HISTORY OF PRESENT ILLNESS:    38-year-old female with a past medical history significant for right lower extremity DVT which was initially diagnosed in  immediately after childbirth for which she took blood thinner for 6 month, patient had a recurrence of DVT in  again after childbirth for which she took blood thinner for 6 month.  At some point she was tested positive for protein S deficiency.  Patient also has a strong family history of blood clots in mother, sister as well as grandparents.  Patient was initially seen in consultation on 2017 and had hypercoagulable workup done which showed type III protein S deficiency.  Patient was recommended to start anticoagulation but she was reluctant to start anticoagulation at this point and she was lost to follow-up after 2017. Patient was seen again at [facility] in 2019 and anticoagulation was started.  She is currently on Coumadin being followed by Coumadin clinic.    Denies any swelling of lower extremity or any new shortness of breath.  Denies any fevers or chills or night sweats.        PAST MEDICAL HISTORY:    Past Medical History:   Diagnosis Date   • Dvt femoral (deep venous thrombosis) (CMS/[facility])        SOCIAL HISTORY:    Social History     Tobacco Use   • Smoking status: Former Smoker     Last attempt to quit: 2015     Years since quitting: 3.7   • Smokeless tobacco: Never Used   Substance Use Topics   • Alcohol use: No     Comment: special occasions   • Drug use: No       Surgical History :  Past Surgical History:   Procedure Laterality Date   •  SECTION     • CHOLECYSTECTOMY         ALLERGIES:    No Known Allergies    REVIEW OF SYSTEMS:      CONSTITUTIONAL:  Complains of fatigue. Denies any fever, chills or weight loss.      HEENT:  No epistaxis, mouth sores or difficulty  "swallowing.     RESPIRATORY:  No new shortness of breath. No new cough or hemoptysis.     CARDIOVASCULAR:  No chest pain or palpitations.     GASTROINTESTINAL:  No abdominal pain nausea, vomiting or blood in the stool.     GENITOURINARY: No Dysuria or Hematuria.     MUSCULOSKELETAL:  Positive for chronic swelling of right lower extremity.     LYMPHATICS:  Denies any abnormal swollen glands anywhere in the body.     NEUROLOGICAL : Complains of migraine headaches.  No tingling or numbness. No dizziness. No seizures or balance problems.     SKIN: No new skin lesions.          PHYSICAL EXAMINATION:      VITAL SIGNS:  /97   Pulse 68   Resp 18   Ht 162.6 cm (64.02\")   Wt 111 kg (244 lb 3.2 oz)   SpO2 99%   BMI 41.90 kg/m²      ECOG performance status: 1    GENERAL:  Not in any distress.Obese female.    HEENT:  Normocephalic, Atraumatic.Mild Conjunctival pallor. No icterus. Extraocular Movements Intact. No Facial Asymmetry noted.    NECK:  No adenopathy. No JVD.    RESPIRATORY:  Fair air entry bilateral. No rhonchi or wheezing.    CARDIOVASCULAR:  S1, S2. Regular rate and rhythm. No murmur or gallop appreciated.    ABDOMEN:  Soft, obese, nontender. Bowel sounds present in all four quadrants.  No hepatosplenomegaly appreciated due to body habitus.    MUSCULOSKELTAL:  Trace lower extremity edema.No Calf Tenderness.Decreased range of motion.    NEUROLOGIC:  Alert, awake and oriented ×3.  No  Motor or sensory deficit appreciated. Cranial Nerves 2-12 grossly intact.    SKIN: No new skin lesions.Skin is warm and moist to touch.    LYMPHATICS: No new lymph node enlargement in neck or supraclavicular area.        DIAGNOSTIC DATA:    Glucose   Date Value Ref Range Status   04/23/2019 112 (H) 65 - 99 mg/dL Final     Sodium   Date Value Ref Range Status   04/23/2019 139 136 - 145 mmol/L Final     Potassium   Date Value Ref Range Status   04/23/2019 3.9 3.5 - 5.2 mmol/L Final     CO2   Date Value Ref Range Status "   04/23/2019 24.0 22.0 - 29.0 mmol/L Final     Chloride   Date Value Ref Range Status   04/23/2019 99 98 - 107 mmol/L Final     Anion Gap   Date Value Ref Range Status   04/23/2019 16.0 mmol/L Final     Creatinine   Date Value Ref Range Status   04/23/2019 0.70 0.57 - 1.00 mg/dL Final     BUN   Date Value Ref Range Status   04/23/2019 14 6 - 20 mg/dL Final     BUN/Creatinine Ratio   Date Value Ref Range Status   04/23/2019 20.0 7.0 - 25.0 Final     Calcium   Date Value Ref Range Status   04/23/2019 9.0 8.6 - 10.5 mg/dL Final     eGFR Non  Amer   Date Value Ref Range Status   04/23/2019 93 >60 mL/min/1.73 Final     Alkaline Phosphatase   Date Value Ref Range Status   04/23/2019 81 39 - 117 U/L Final     Total Protein   Date Value Ref Range Status   04/23/2019 7.4 6.0 - 8.5 g/dL Final     ALT (SGPT)   Date Value Ref Range Status   04/23/2019 16 1 - 33 U/L Final     AST (SGOT)   Date Value Ref Range Status   04/23/2019 17 1 - 32 U/L Final     Total Bilirubin   Date Value Ref Range Status   04/23/2019 0.3 0.2 - 1.2 mg/dL Final     Albumin   Date Value Ref Range Status   04/23/2019 4.00 3.50 - 5.20 g/dL Final     Globulin   Date Value Ref Range Status   04/23/2019 3.4 gm/dL Final     Lab Results   Component Value Date    WBC 7.12 04/23/2019    HGB 14.3 04/23/2019    HCT 43.1 04/23/2019    MCV 82.1 04/23/2019     04/23/2019     Lab Results   Component Value Date    NEUTROABS 4.15 04/23/2019    IRON 38 07/31/2017    TIBC 338 07/31/2017    LABIRON 11 (L) 07/31/2017    FERRITIN 46.70 07/31/2017    VFGDEHAR57 294 07/31/2017    FOLATE 16.50 07/31/2017     Protein C Activity      Ref Range & Units 3wk ago     Prt C Activity (Chromogenic) % 97   Comments: Reference Range:   17 years and older: 73 - 180   Resulting Agency  [facility]   Narrative   Performed at:  [facility]  [address]  [id number]  : Ml Arteaga MD, Phone:  [phone]      Specimen  Collected: 07/31/17  4:26 PM Last Resulted: 08/04/17                   Protein C & S, Functional      Ref Range & Units 3wk ago     Protein C-Functional 73 - 180 % 86   Protein S-Functional 63 - 140 % 58 (L)             Protein C & S Antigens   Order: [id number]   Status:  Final result   Visible to patient:  No (Not Released) Dx:  Protein S deficiency      Ref Range & Units 3wk ago     Protein C Antigen 60 - 150 % 79   Protein S Ag, Total 60 - 150 % 85   Comments: This test was developed and its performance characteristics   determined by [facility]. It has not been cleared or approved   by the Food and Drug Administration.   Protein S Ag, Free 57 - 157 % 31 (L)           Rest of the hypercoagulable testing with lupus anticoagulant, anticardiolipin antibodies, factor V Leyden mutation, antithrombin III level and prothrombin gene mutation were negative on July 31, 2017.        RADIOLOGY DATA:  CT chest with contrast done on January 21, 2019 showed:  IMPRESSION:  CONCLUSION:  Bilateral nodules with faint rims of peripheral calcification,  more likely due to an infectious or inflammatory process than  neoplasm. Recommend follow-up chest CT in three months to assess  for stability. These could also be further assessed with PET/CT  or percutaneous biopsy of the largest lesion.        ASSESSMENT AND PLAN:      1.  Protein S deficiency:  - Based on the hypercoagulable workup done on July 31, 2017 showing has a type III protein S deficiency with normal total protein S antigen, and decreasing protein S antigen free as well as protein S functional.    -Since patient has a history of DVT ×2 first time in 1998 and second time in 2006 both time after childbirth she took Coumadin for 6 month on each occasion.  Since patient has a very strong family history of DVT and pulmonary embolism, her protein S deficiency is worrisome for recurrence of DVT.  She does not have blood clot since 2006.    -In August 2017, for anticoagulation was  recommended to patient.  However she was not willing to do anticoagulation at that point.  She was also offered to go for second opinion, she did not do that secondary to financial constraints.  -Now patient is interested in taking anticoagulation again.  -Due to 2 episode of DVT and type III protein S deficiency, anticoagulation option consisting of Lovenox with Coumadin or newer anticoagulation like Apixaban or xarelto were discussed with patient.  -She is interested in taking Coumadin again.  -Patient was started back on anticoagulation Coumadin on January 29, 2019.  -She is currently being followed by Coumadin clinic for INR check.  -We will ask patient to return to clinic in 4 months with a repeat CBC and CMP to be done on that day.  -Patient was instructed to come to the emergency room if she starts having any bleeding complication.      2.  History of 3 miscarriages before ninth week of pregnancy.  Lupus anticoagulant as well as anticardiolipin antibodies has been negative.    3.  Multiple lung nodules:  -Patient was found to have multiple lung nodules on a CT scan done in January 2019.  Nodules does have a calcification, most likely benign etiology.  -Patient is scheduled to get CT of chest without contrast done on April 24, 2019 and had a follow-up appointment with Dr. Bonilla on April 26, 2019.    4. Health maintenance: Patient does not smoke.  She is only 38 years of age and not due for colonoscopy at this point.      5. BMI: Patient's Body mass index is 41.9 kg/m². BMI is higher than reference range.  Patient is aware of elevated BMI    6. Advance Care Planning: For now patient remains full code and is able to make  Her decisions.  Patient has health care surrogate mentioned on chart.         Al Coppola MD  4/23/2019  4:00 PM

## 2019-04-24 ENCOUNTER — HOSPITAL ENCOUNTER (OUTPATIENT)
Dept: CT IMAGING | Facility: HOSPITAL | Age: 39
Discharge: HOME | End: 2019-04-24
Admitting: INTERNAL MEDICINE

## 2019-04-24 DIAGNOSIS — R91.8 LUNG NODULES: ICD-10-CM

## 2019-04-24 PROCEDURE — 71250 CT THORAX DX C-: CPT

## 2019-04-25 NOTE — PROGRESS NOTES
Pulmonary Office Follow-up     Leona Cardoso is seen in the office today for   Chief Complaint   Patient presents with   • Cough   .    Subjective     History of Present Illness  Leona Cardoso is a 38 y.o. female with a PMH significant for morbid obesity, allergies, RLE DVT, and protein S deficiency who presents for follow-up of dyspnea and recent CT chest.  She was last seen on 2/22/19, at which time she was doing well with infrequent albuterol use and her cough was improved.  I recommended a repeat CT chest in April.  She states that she has been doing very well with regards to her dyspnea and is only needed her albuterol about 4 times since her last visit.  She does report that she is having more issues with her allergies with sinus pain/pressure and congestion.  She is using loratadine on a daily basis but feels it is not helping.  Patient also will sometimes use Sudafed or Afrin when the congestion is very bad but she does not use this on a regular basis.  Patient has Flonase but states that she is not using it as much as she should.  She has occasional cough, but no sputum, chest pain, or weight changes.  Patient states that she is concerned that her blood pressure has been elevated every time she has it checked at the Coumadin clinic and wants to know if something needs to be started.  She states that she mentioned this to her PCP, but no recommendations were made.    Review of Systems: History obtained from chart review and the patient.  Review of Systems   Constitutional: Positive for fatigue. Negative for fever and unexpected weight change.   HENT: Positive for congestion, postnasal drip and sinus pressure.    Respiratory: Positive for cough and shortness of breath. Negative for chest tightness.    Musculoskeletal: Negative for arthralgias and back pain.   Neurological: Positive for headaches.     As described in the HPI. Otherwise, remainder of ROS (14 systems) were negative.    Patient  Active Problem List   Diagnosis   • Class 3 severe obesity due to excess calories without serious comorbidity with body mass index (BMI) of 40.0 to 44.9 in adult (CMS/[facility])   • Protein S deficiency (CMS/[facility])   • Personal history of tobacco use, presenting hazards to health   • History of DVT of lower extremity   • Multiple lung nodules on CT   • Long term (current) use of anticoagulants [Z79.01]   • Seasonal allergic rhinitis due to pollen   • Mild intermittent asthma without complication         Current Outpatient Medications:   •  albuterol sulfate  (90 Base) MCG/ACT inhaler, Inhale 2 puffs Every 4 (Four) Hours As Needed for Wheezing or Shortness of Air., Disp: 18 g, Rfl: 11  •  busPIRone (BUSPAR) 5 MG tablet, Take 1 tablet by mouth 3 (Three) Times a Day As Needed (Anxiety)., Disp: 90 tablet, Rfl: 3  •  fluticasone (FLONASE) 50 MCG/ACT nasal spray, 2 sprays into the nostril(s) as directed by provider Daily. Till symptoms gone, Disp: 1 bottle, Rfl: 11  •  ketorolac (TORADOL) 10 MG tablet, Take 1 tablet by mouth Every 6 (Six) Hours As Needed for Moderate Pain ., Disp: 30 tablet, Rfl: 0  •  loratadine (CLARITIN) 10 MG tablet, Take 1 tablet by mouth Daily., Disp: 30 tablet, Rfl: 11  •  ondansetron (ZOFRAN) 4 MG tablet, Take 1 tablet by mouth Every 8 (Eight) Hours As Needed for Nausea or Vomiting., Disp: 30 tablet, Rfl: 0  •  oxymetazoline (AFRIN) 0.05 % nasal spray, 2 sprays into the nostril(s) as directed by provider As Needed., Disp: , Rfl:   •  pseudoephedrine (SUDAFED 12 HOUR) 120 MG 12 hr tablet, Take 1 tablet by mouth Every 12 (Twelve) Hours. (Patient taking differently: Take 30 mg by mouth As Needed.), Disp: 20 tablet, Rfl: 0  •  warfarin (COUMADIN) 7.5 MG tablet, TAKE 1 TABLET NIGHTLY EXCEPT ON Sunday & Wednesday TAKE ONE AND ONE-HALF OR  AS  DIRECTED  BY  COUMADIN  CLINIC, Disp: 45 tablet, Rfl: 1    No Known Allergies    Past Medical History:   Diagnosis Date   • Dvt femoral (deep venous thrombosis)  "(CMS/[facility])      Past Surgical History:   Procedure Laterality Date   •  SECTION     • CHOLECYSTECTOMY     • TUBAL ABDOMINAL LIGATION Bilateral     2016     Social History     Socioeconomic History   • Marital status:      Spouse name: Not on file   • Number of children: Not on file   • Years of education: Not on file   • Highest education level: Not on file   Tobacco Use   • Smoking status: Former Smoker     Last attempt to quit: 2015     Years since quitting: 3.8   • Smokeless tobacco: Never Used   Substance and Sexual Activity   • Alcohol use: No     Comment: special occasions   • Drug use: No   • Sexual activity: Yes     Partners: Male     Birth control/protection: Surgical     Family History   Problem Relation Age of Onset   • Hypertension Mother    • Cancer Paternal Grandmother    • Heart disease Paternal Grandfather           Objective     Blood pressure 143/87, pulse 67, height 162.6 cm (64\"), weight 110 kg (242 lb), SpO2 99 %.  Physical Exam   Constitutional: She is oriented to person, place, and time. Vital signs are normal. She appears well-developed and well-nourished.   Morbidly obese   HENT:   Head: Normocephalic and atraumatic.   Nose: Mucosal edema present. No rhinorrhea.   Mouth/Throat: Uvula is midline, oropharynx is clear and moist and mucous membranes are normal.   Mallampati 2   Eyes: Conjunctivae, EOM and lids are normal. Pupils are equal, round, and reactive to light.   Eyeglasses   Neck: Trachea normal and normal range of motion. No tracheal tenderness present. No thyroid mass present.   Cardiovascular: Normal rate, regular rhythm and normal heart sounds. PMI is not displaced. Exam reveals no gallop.   No murmur heard.  Pulmonary/Chest: Effort normal and breath sounds normal. No respiratory distress. She has no decreased breath sounds. She has no wheezes. She has no rhonchi. Chest wall is not dull to percussion. She exhibits no tenderness.   Abdominal: Soft. Normal appearance " and bowel sounds are normal. There is no hepatomegaly. There is no tenderness.   obese   Musculoskeletal:   Normal gait, no extremity edema     Vascular Status -  Her right foot exhibits no edema. Her left foot exhibits no edema.  Lymphadenopathy:        Head (right side): No submandibular adenopathy present.        Head (left side): No submandibular adenopathy present.     She has no cervical adenopathy.        Right: No supraclavicular adenopathy present.        Left: No supraclavicular adenopathy present.   Neurological: She is alert and oriented to person, place, and time.   Skin: Skin is warm and dry. No rash noted. No cyanosis. Nails show no clubbing.   Psychiatric: She has a normal mood and affect. Her speech is normal and behavior is normal. Judgment normal.   Nursing note and vitals reviewed.      PFTs: 1/23/19 (independently reviewed and interpreted by me)  Ratio 84  FVC 3.52/ 95%  FEV1 2.94/ 97%  TLC 3.88/ 76%  DLCO 20.33/ 83%  Normal spirometry and diffusing capacity.  No significant bronchodilator response.  Reduced TLC but normal FVC.  No comparative data available.    Radiology (independently reviewed and interpreted by me): CT chest with contrast 4/24/19 showed stable bilateral nodules with peripheral calcification likely representing benign granulomas, no new nodules       Assessment/Plan     Leona was seen today for cough.    Diagnoses and all orders for this visit:    Multiple lung nodules on CT    Mild intermittent asthma without complication    Seasonal allergic rhinitis due to pollen    Elevated blood pressure reading    Class 3 severe obesity due to excess calories without serious comorbidity with body mass index (BMI) of 40.0 to 44.9 in adult (CMS/[facility])    Personal history of tobacco use, presenting hazards to health         Discussion/ Recommendations:   I personally reviewed images from her recent CT chest which showed stable calcified granulomas and no new concerning nodules.  I do not  feel that further surveillance is necessary at this time.  Otherwise, her dyspnea is stable with rare albuterol use but she is having some increased seasonal allergy symptoms.  I discussed with her that her blood pressure has been above goal according to the last several recordings and have made recommendations as below.    -No further surveillance CTs indicated  -Continue albuterol as needed for dyspnea or wheeze.    -If she has increased albuterol need 3 or more times a week, I would recommend escalating to an ICS.  -Recommended she start using Flonase on a daily basis.  -If allergic rhinitis persists, she should change from loratadine to cetirizine daily  -Avoid regular usage of pseudoephedrine and oxymetazoline due to rebound  -Provided with a handout on the DASH eating plan to help control blood pressure.  Also, encouraged her to start regular, progressive exercise.  -If blood pressure remains elevated despite above measures, she should follow-up with her PCP for possible medication management.    Patient's Body mass index is 41.54 kg/m². BMI is above normal parameters. Recommendations include: exercise counseling.           Return if symptoms worsen or fail to improve.      Thank you for allowing me to participate in the care of Leona Cardoso. Please do not hesitate to contact me with any questions.         This document has been electronically signed by Lisa Bonilla MD on April 26, 2019 10:55 AM      Dictated using Dragon

## 2019-04-26 ENCOUNTER — ANTICOAGULATION VISIT (OUTPATIENT)
Dept: CARDIAC SURGERY | Facility: CLINIC | Age: 39
End: 2019-04-26

## 2019-04-26 ENCOUNTER — OFFICE VISIT (OUTPATIENT)
Dept: PULMONOLOGY | Facility: CLINIC | Age: 39
End: 2019-04-26

## 2019-04-26 VITALS
WEIGHT: 242 LBS | DIASTOLIC BLOOD PRESSURE: 87 MMHG | HEIGHT: 64 IN | HEART RATE: 67 BPM | OXYGEN SATURATION: 99 % | SYSTOLIC BLOOD PRESSURE: 143 MMHG | BODY MASS INDEX: 41.32 KG/M2

## 2019-04-26 DIAGNOSIS — R03.0 ELEVATED BLOOD PRESSURE READING: ICD-10-CM

## 2019-04-26 DIAGNOSIS — Z87.891 PERSONAL HISTORY OF TOBACCO USE, PRESENTING HAZARDS TO HEALTH: ICD-10-CM

## 2019-04-26 DIAGNOSIS — Z79.01 LONG TERM (CURRENT) USE OF ANTICOAGULANTS: ICD-10-CM

## 2019-04-26 DIAGNOSIS — J30.1 SEASONAL ALLERGIC RHINITIS DUE TO POLLEN: ICD-10-CM

## 2019-04-26 DIAGNOSIS — D68.59 PROTEIN S DEFICIENCY (HCC): ICD-10-CM

## 2019-04-26 DIAGNOSIS — R91.8 MULTIPLE LUNG NODULES ON CT: Primary | ICD-10-CM

## 2019-04-26 DIAGNOSIS — Z86.718 HISTORY OF DVT OF LOWER EXTREMITY: ICD-10-CM

## 2019-04-26 DIAGNOSIS — J45.20 MILD INTERMITTENT ASTHMA WITHOUT COMPLICATION: ICD-10-CM

## 2019-04-26 DIAGNOSIS — E66.01 CLASS 3 SEVERE OBESITY DUE TO EXCESS CALORIES WITHOUT SERIOUS COMORBIDITY WITH BODY MASS INDEX (BMI) OF 40.0 TO 44.9 IN ADULT (HCC): ICD-10-CM

## 2019-04-26 LAB — INR PPP: 1.6 (ref 0.9–1.1)

## 2019-04-26 PROCEDURE — 99211 OFF/OP EST MAY X REQ PHY/QHP: CPT | Performed by: NURSE PRACTITIONER

## 2019-04-26 PROCEDURE — 85610 PROTHROMBIN TIME: CPT | Performed by: NURSE PRACTITIONER

## 2019-04-26 PROCEDURE — 99214 OFFICE O/P EST MOD 30 MIN: CPT | Performed by: INTERNAL MEDICINE

## 2019-04-26 NOTE — PROGRESS NOTES
Today's INR is 1.6. Pt here today seeing Dr Bonilla. Pt denies med changes or bleeding problems. Pt states she missed last Friday nights dose and this Wednesday nights dose. Dose adjusted today and pt instructed to hold green veggies for 3 days; pt verbalized. Patient instructed regarding medication; results given and questions answered. Nutritional counseling given.  Dietary factors affecting therapy addressed.  Patient instructed to monitor for excessive bruising or bleeding. Will recheck in 1.5 weeks.       This document has been electronically signed by SAIDA Delvalle-BC @  On April 26, 2019 10:59 AM

## 2019-04-26 NOTE — PATIENT INSTRUCTIONS
"DASH Eating Plan  DASH stands for \"Dietary Approaches to Stop Hypertension.\" The DASH eating plan is a healthy eating plan that has been shown to reduce high blood pressure (hypertension). It may also reduce your risk for type 2 diabetes, heart disease, and stroke. The DASH eating plan may also help with weight loss.  What are tips for following this plan?  General guidelines  · Avoid eating more than 2,300 mg (milligrams) of salt (sodium) a day. If you have hypertension, you may need to reduce your sodium intake to 1,500 mg a day.  · Limit alcohol intake to no more than 1 drink a day for nonpregnant women and 2 drinks a day for men. One drink equals 12 oz of beer, 5 oz of wine, or 1½ oz of hard liquor.  · Work with your health care provider to maintain a healthy body weight or to lose weight. Ask what an ideal weight is for you.  · Get at least 30 minutes of exercise that causes your heart to beat faster (aerobic exercise) most days of the week. Activities may include walking, swimming, or biking.  · Work with your health care provider or diet and nutrition specialist (dietitian) to adjust your eating plan to your individual calorie needs.  Reading food labels  · Check food labels for the amount of sodium per serving. Choose foods with less than 5 percent of the Daily Value of sodium. Generally, foods with less than 300 mg of sodium per serving fit into this eating plan.  · To find whole grains, look for the word \"whole\" as the first word in the ingredient list.  Shopping  · Buy products labeled as \"low-sodium\" or \"no salt added.\"  · Buy fresh foods. Avoid canned foods and premade or frozen meals.  Cooking  · Avoid adding salt when cooking. Use salt-free seasonings or herbs instead of table salt or sea salt. Check with your health care provider or pharmacist before using salt substitutes.  · Do not moses foods. Cook foods using healthy methods such as baking, boiling, grilling, and broiling instead.  · Cook with " heart-healthy oils, such as olive, canola, soybean, or sunflower oil.  Meal planning    · Eat a balanced diet that includes:  ? 5 or more servings of fruits and vegetables each day. At each meal, try to fill half of your plate with fruits and vegetables.  ? Up to 6-8 servings of whole grains each day.  ? Less than 6 oz of lean meat, poultry, or fish each day. A 3-oz serving of meat is about the same size as a deck of cards. One egg equals 1 oz.  ? 2 servings of low-fat dairy each day.  ? A serving of nuts, seeds, or beans 5 times each week.  ? Heart-healthy fats. Healthy fats called Omega-3 fatty acids are found in foods such as flaxseeds and coldwater fish, like sardines, salmon, and mackerel.  · Limit how much you eat of the following:  ? Canned or prepackaged foods.  ? Food that is high in trans fat, such as fried foods.  ? Food that is high in saturated fat, such as fatty meat.  ? Sweets, desserts, sugary drinks, and other foods with added sugar.  ? Full-fat dairy products.  · Do not salt foods before eating.  · Try to eat at least 2 vegetarian meals each week.  · Eat more home-cooked food and less restaurant, buffet, and fast food.  · When eating at a restaurant, ask that your food be prepared with less salt or no salt, if possible.  What foods are recommended?  The items listed may not be a complete list. Talk with your dietitian about what dietary choices are best for you.  Grains  Whole-grain or whole-wheat bread. Whole-grain or whole-wheat pasta. Brown rice. Oatmeal. Quinoa. Bulgur. Whole-grain and low-sodium cereals. Lexi bread. Low-fat, low-sodium crackers. Whole-wheat flour tortillas.  Vegetables  Fresh or frozen vegetables (raw, steamed, roasted, or grilled). Low-sodium or reduced-sodium tomato and vegetable juice. Low-sodium or reduced-sodium tomato sauce and tomato paste. Low-sodium or reduced-sodium canned vegetables.  Fruits  All fresh, dried, or frozen fruit. Canned fruit in natural juice (without  added sugar).  Meat and other protein foods  Skinless chicken or turkey. Ground chicken or turkey. Pork with fat trimmed off. Fish and seafood. Egg whites. Dried beans, peas, or lentils. Unsalted nuts, nut butters, and seeds. Unsalted canned beans. Lean cuts of beef with fat trimmed off. Low-sodium, lean deli meat.  Dairy  Low-fat (1%) or fat-free (skim) milk. Fat-free, low-fat, or reduced-fat cheeses. Nonfat, low-sodium ricotta or cottage cheese. Low-fat or nonfat yogurt. Low-fat, low-sodium cheese.  Fats and oils  Soft margarine without trans fats. Vegetable oil. Low-fat, reduced-fat, or light mayonnaise and salad dressings (reduced-sodium). Canola, safflower, olive, soybean, and sunflower oils. Avocado.  Seasoning and other foods  Herbs. Spices. Seasoning mixes without salt. Unsalted popcorn and pretzels. Fat-free sweets.  What foods are not recommended?  The items listed may not be a complete list. Talk with your dietitian about what dietary choices are best for you.  Grains  Baked goods made with fat, such as croissants, muffins, or some breads. Dry pasta or rice meal packs.  Vegetables  Creamed or fried vegetables. Vegetables in a cheese sauce. Regular canned vegetables (not low-sodium or reduced-sodium). Regular canned tomato sauce and paste (not low-sodium or reduced-sodium). Regular tomato and vegetable juice (not low-sodium or reduced-sodium). Pickles. Olives.  Fruits  Canned fruit in a light or heavy syrup. Fried fruit. Fruit in cream or butter sauce.  Meat and other protein foods  Fatty cuts of meat. Ribs. Fried meat. Torres. Sausage. Bologna and other processed lunch meats. Salami. Fatback. Hotdogs. Bratwurst. Salted nuts and seeds. Canned beans with added salt. Canned or smoked fish. Whole eggs or egg yolks. Chicken or turkey with skin.  Dairy  Whole or 2% milk, cream, and half-and-half. Whole or full-fat cream cheese. Whole-fat or sweetened yogurt. Full-fat cheese. Nondairy creamers. Whipped toppings.  "Processed cheese and cheese spreads.  Fats and oils  Butter. Stick margarine. Lard. Shortening. Ghee. Torres fat. Tropical oils, such as coconut, palm kernel, or palm oil.  Seasoning and other foods  Salted popcorn and pretzels. Onion salt, garlic salt, seasoned salt, table salt, and sea salt. Worcestershire sauce. Tartar sauce. Barbecue sauce. Teriyaki sauce. Soy sauce, including reduced-sodium. Steak sauce. Canned and packaged gravies. Fish sauce. Oyster sauce. Cocktail sauce. Horseradish that you find on the shelf. Ketchup. Mustard. Meat flavorings and tenderizers. Bouillon cubes. Hot sauce and Tabasco sauce. Premade or packaged marinades. Premade or packaged taco seasonings. Relishes. Regular salad dressings.  Where to find more information:  · National Heart, Lung, and Blood [place]: www.nhlbi.nih.gov  · American Heart Association: www.heart.org  Summary  · The DASH eating plan is a healthy eating plan that has been shown to reduce high blood pressure (hypertension). It may also reduce your risk for type 2 diabetes, heart disease, and stroke.  · With the DASH eating plan, you should limit salt (sodium) intake to 2,300 mg a day. If you have hypertension, you may need to reduce your sodium intake to 1,500 mg a day.  · When on the DASH eating plan, aim to eat more fresh fruits and vegetables, whole grains, lean proteins, low-fat dairy, and heart-healthy fats.  · Work with your health care provider or diet and nutrition specialist (dietitian) to adjust your eating plan to your individual calorie needs.  This information is not intended to replace advice given to you by your health care provider. Make sure you discuss any questions you have with your health care provider.  Document Released: 12/06/2012 Document Revised: 12/11/2017 Document Reviewed: 12/11/2017  [employer] Interactive Patient Education © 2019 [employer]    DASH Eating Plan  DASH stands for \"Dietary Approaches to Stop Hypertension.\" The DASH eating " "plan is a healthy eating plan that has been shown to reduce high blood pressure (hypertension). It may also reduce your risk for type 2 diabetes, heart disease, and stroke. The DASH eating plan may also help with weight loss.  What are tips for following this plan?  General guidelines  · Avoid eating more than 2,300 mg (milligrams) of salt (sodium) a day. If you have hypertension, you may need to reduce your sodium intake to 1,500 mg a day.  · Limit alcohol intake to no more than 1 drink a day for nonpregnant women and 2 drinks a day for men. One drink equals 12 oz of beer, 5 oz of wine, or 1½ oz of hard liquor.  · Work with your health care provider to maintain a healthy body weight or to lose weight. Ask what an ideal weight is for you.  · Get at least 30 minutes of exercise that causes your heart to beat faster (aerobic exercise) most days of the week. Activities may include walking, swimming, or biking.  · Work with your health care provider or diet and nutrition specialist (dietitian) to adjust your eating plan to your individual calorie needs.  Reading food labels  · Check food labels for the amount of sodium per serving. Choose foods with less than 5 percent of the Daily Value of sodium. Generally, foods with less than 300 mg of sodium per serving fit into this eating plan.  · To find whole grains, look for the word \"whole\" as the first word in the ingredient list.  Shopping  · Buy products labeled as \"low-sodium\" or \"no salt added.\"  · Buy fresh foods. Avoid canned foods and premade or frozen meals.  Cooking  · Avoid adding salt when cooking. Use salt-free seasonings or herbs instead of table salt or sea salt. Check with your health care provider or pharmacist before using salt substitutes.  · Do not moses foods. Cook foods using healthy methods such as baking, boiling, grilling, and broiling instead.  · Cook with heart-healthy oils, such as olive, canola, soybean, or sunflower oil.  Meal planning    · Eat a " balanced diet that includes:  ? 5 or more servings of fruits and vegetables each day. At each meal, try to fill half of your plate with fruits and vegetables.  ? Up to 6-8 servings of whole grains each day.  ? Less than 6 oz of lean meat, poultry, or fish each day. A 3-oz serving of meat is about the same size as a deck of cards. One egg equals 1 oz.  ? 2 servings of low-fat dairy each day.  ? A serving of nuts, seeds, or beans 5 times each week.  ? Heart-healthy fats. Healthy fats called Omega-3 fatty acids are found in foods such as flaxseeds and coldwater fish, like sardines, salmon, and mackerel.  · Limit how much you eat of the following:  ? Canned or prepackaged foods.  ? Food that is high in trans fat, such as fried foods.  ? Food that is high in saturated fat, such as fatty meat.  ? Sweets, desserts, sugary drinks, and other foods with added sugar.  ? Full-fat dairy products.  · Do not salt foods before eating.  · Try to eat at least 2 vegetarian meals each week.  · Eat more home-cooked food and less restaurant, buffet, and fast food.  · When eating at a restaurant, ask that your food be prepared with less salt or no salt, if possible.  What foods are recommended?  The items listed may not be a complete list. Talk with your dietitian about what dietary choices are best for you.  Grains  Whole-grain or whole-wheat bread. Whole-grain or whole-wheat pasta. Brown rice. Oatmeal. Quinoa. Bulgur. Whole-grain and low-sodium cereals. Lexi bread. Low-fat, low-sodium crackers. Whole-wheat flour tortillas.  Vegetables  Fresh or frozen vegetables (raw, steamed, roasted, or grilled). Low-sodium or reduced-sodium tomato and vegetable juice. Low-sodium or reduced-sodium tomato sauce and tomato paste. Low-sodium or reduced-sodium canned vegetables.  Fruits  All fresh, dried, or frozen fruit. Canned fruit in natural juice (without added sugar).  Meat and other protein foods  Skinless chicken or turkey. Ground chicken or  turkey. Pork with fat trimmed off. Fish and seafood. Egg whites. Dried beans, peas, or lentils. Unsalted nuts, nut butters, and seeds. Unsalted canned beans. Lean cuts of beef with fat trimmed off. Low-sodium, lean deli meat.  Dairy  Low-fat (1%) or fat-free (skim) milk. Fat-free, low-fat, or reduced-fat cheeses. Nonfat, low-sodium ricotta or cottage cheese. Low-fat or nonfat yogurt. Low-fat, low-sodium cheese.  Fats and oils  Soft margarine without trans fats. Vegetable oil. Low-fat, reduced-fat, or light mayonnaise and salad dressings (reduced-sodium). Canola, safflower, olive, soybean, and sunflower oils. Avocado.  Seasoning and other foods  Herbs. Spices. Seasoning mixes without salt. Unsalted popcorn and pretzels. Fat-free sweets.  What foods are not recommended?  The items listed may not be a complete list. Talk with your dietitian about what dietary choices are best for you.  Grains  Baked goods made with fat, such as croissants, muffins, or some breads. Dry pasta or rice meal packs.  Vegetables  Creamed or fried vegetables. Vegetables in a cheese sauce. Regular canned vegetables (not low-sodium or reduced-sodium). Regular canned tomato sauce and paste (not low-sodium or reduced-sodium). Regular tomato and vegetable juice (not low-sodium or reduced-sodium). Pickles. Olives.  Fruits  Canned fruit in a light or heavy syrup. Fried fruit. Fruit in cream or butter sauce.  Meat and other protein foods  Fatty cuts of meat. Ribs. Fried meat. Torres. Sausage. Bologna and other processed lunch meats. Salami. Fatback. Hotdogs. Bratwurst. Salted nuts and seeds. Canned beans with added salt. Canned or smoked fish. Whole eggs or egg yolks. Chicken or turkey with skin.  Dairy  Whole or 2% milk, cream, and half-and-half. Whole or full-fat cream cheese. Whole-fat or sweetened yogurt. Full-fat cheese. Nondairy creamers. Whipped toppings. Processed cheese and cheese spreads.  Fats and oils  Butter. Stick margarine. Lard.  "Shortening. Ghee. Torres fat. Tropical oils, such as coconut, palm kernel, or palm oil.  Seasoning and other foods  Salted popcorn and pretzels. Onion salt, garlic salt, seasoned salt, table salt, and sea salt. Worcestershire sauce. Tartar sauce. Barbecue sauce. Teriyaki sauce. Soy sauce, including reduced-sodium. Steak sauce. Canned and packaged gravies. Fish sauce. Oyster sauce. Cocktail sauce. Horseradish that you find on the shelf. Ketchup. Mustard. Meat flavorings and tenderizers. Bouillon cubes. Hot sauce and Tabasco sauce. Premade or packaged marinades. Premade or packaged taco seasonings. Relishes. Regular salad dressings.  Where to find more information:  · National Heart, Lung, and Blood [place]: www.nhlbi.nih.gov  · American Heart Association: www.heart.org  Summary  · The DASH eating plan is a healthy eating plan that has been shown to reduce high blood pressure (hypertension). It may also reduce your risk for type 2 diabetes, heart disease, and stroke.  · With the DASH eating plan, you should limit salt (sodium) intake to 2,300 mg a day. If you have hypertension, you may need to reduce your sodium intake to 1,500 mg a day.  · When on the DASH eating plan, aim to eat more fresh fruits and vegetables, whole grains, lean proteins, low-fat dairy, and heart-healthy fats.  · Work with your health care provider or diet and nutrition specialist (dietitian) to adjust your eating plan to your individual calorie needs.  This information is not intended to replace advice given to you by your health care provider. Make sure you discuss any questions you have with your health care provider.  Document Released: 12/06/2012 Document Revised: 12/11/2017 Document Reviewed: 12/11/2017  [employer] Interactive Patient Education © 2019 [employer]    DASH Eating Plan  DASH stands for \"Dietary Approaches to Stop Hypertension.\" The DASH eating plan is a healthy eating plan that has been shown to reduce high blood pressure " "(hypertension). It may also reduce your risk for type 2 diabetes, heart disease, and stroke. The DASH eating plan may also help with weight loss.  What are tips for following this plan?  General guidelines  · Avoid eating more than 2,300 mg (milligrams) of salt (sodium) a day. If you have hypertension, you may need to reduce your sodium intake to 1,500 mg a day.  · Limit alcohol intake to no more than 1 drink a day for nonpregnant women and 2 drinks a day for men. One drink equals 12 oz of beer, 5 oz of wine, or 1½ oz of hard liquor.  · Work with your health care provider to maintain a healthy body weight or to lose weight. Ask what an ideal weight is for you.  · Get at least 30 minutes of exercise that causes your heart to beat faster (aerobic exercise) most days of the week. Activities may include walking, swimming, or biking.  · Work with your health care provider or diet and nutrition specialist (dietitian) to adjust your eating plan to your individual calorie needs.  Reading food labels  · Check food labels for the amount of sodium per serving. Choose foods with less than 5 percent of the Daily Value of sodium. Generally, foods with less than 300 mg of sodium per serving fit into this eating plan.  · To find whole grains, look for the word \"whole\" as the first word in the ingredient list.  Shopping  · Buy products labeled as \"low-sodium\" or \"no salt added.\"  · Buy fresh foods. Avoid canned foods and premade or frozen meals.  Cooking  · Avoid adding salt when cooking. Use salt-free seasonings or herbs instead of table salt or sea salt. Check with your health care provider or pharmacist before using salt substitutes.  · Do not moses foods. Cook foods using healthy methods such as baking, boiling, grilling, and broiling instead.  · Cook with heart-healthy oils, such as olive, canola, soybean, or sunflower oil.  Meal planning    · Eat a balanced diet that includes:  ? 5 or more servings of fruits and vegetables each " day. At each meal, try to fill half of your plate with fruits and vegetables.  ? Up to 6-8 servings of whole grains each day.  ? Less than 6 oz of lean meat, poultry, or fish each day. A 3-oz serving of meat is about the same size as a deck of cards. One egg equals 1 oz.  ? 2 servings of low-fat dairy each day.  ? A serving of nuts, seeds, or beans 5 times each week.  ? Heart-healthy fats. Healthy fats called Omega-3 fatty acids are found in foods such as flaxseeds and coldwater fish, like sardines, salmon, and mackerel.  · Limit how much you eat of the following:  ? Canned or prepackaged foods.  ? Food that is high in trans fat, such as fried foods.  ? Food that is high in saturated fat, such as fatty meat.  ? Sweets, desserts, sugary drinks, and other foods with added sugar.  ? Full-fat dairy products.  · Do not salt foods before eating.  · Try to eat at least 2 vegetarian meals each week.  · Eat more home-cooked food and less restaurant, buffet, and fast food.  · When eating at a restaurant, ask that your food be prepared with less salt or no salt, if possible.  What foods are recommended?  The items listed may not be a complete list. Talk with your dietitian about what dietary choices are best for you.  Grains  Whole-grain or whole-wheat bread. Whole-grain or whole-wheat pasta. Brown rice. Oatmeal. Quinoa. Bulgur. Whole-grain and low-sodium cereals. Lexi bread. Low-fat, low-sodium crackers. Whole-wheat flour tortillas.  Vegetables  Fresh or frozen vegetables (raw, steamed, roasted, or grilled). Low-sodium or reduced-sodium tomato and vegetable juice. Low-sodium or reduced-sodium tomato sauce and tomato paste. Low-sodium or reduced-sodium canned vegetables.  Fruits  All fresh, dried, or frozen fruit. Canned fruit in natural juice (without added sugar).  Meat and other protein foods  Skinless chicken or turkey. Ground chicken or turkey. Pork with fat trimmed off. Fish and seafood. Egg whites. Dried beans, peas, or  lentils. Unsalted nuts, nut butters, and seeds. Unsalted canned beans. Lean cuts of beef with fat trimmed off. Low-sodium, lean deli meat.  Dairy  Low-fat (1%) or fat-free (skim) milk. Fat-free, low-fat, or reduced-fat cheeses. Nonfat, low-sodium ricotta or cottage cheese. Low-fat or nonfat yogurt. Low-fat, low-sodium cheese.  Fats and oils  Soft margarine without trans fats. Vegetable oil. Low-fat, reduced-fat, or light mayonnaise and salad dressings (reduced-sodium). Canola, safflower, olive, soybean, and sunflower oils. Avocado.  Seasoning and other foods  Herbs. Spices. Seasoning mixes without salt. Unsalted popcorn and pretzels. Fat-free sweets.  What foods are not recommended?  The items listed may not be a complete list. Talk with your dietitian about what dietary choices are best for you.  Grains  Baked goods made with fat, such as croissants, muffins, or some breads. Dry pasta or rice meal packs.  Vegetables  Creamed or fried vegetables. Vegetables in a cheese sauce. Regular canned vegetables (not low-sodium or reduced-sodium). Regular canned tomato sauce and paste (not low-sodium or reduced-sodium). Regular tomato and vegetable juice (not low-sodium or reduced-sodium). Pickles. Olives.  Fruits  Canned fruit in a light or heavy syrup. Fried fruit. Fruit in cream or butter sauce.  Meat and other protein foods  Fatty cuts of meat. Ribs. Fried meat. Torres. Sausage. Bologna and other processed lunch meats. Salami. Fatback. Hotdogs. Bratwurst. Salted nuts and seeds. Canned beans with added salt. Canned or smoked fish. Whole eggs or egg yolks. Chicken or turkey with skin.  Dairy  Whole or 2% milk, cream, and half-and-half. Whole or full-fat cream cheese. Whole-fat or sweetened yogurt. Full-fat cheese. Nondairy creamers. Whipped toppings. Processed cheese and cheese spreads.  Fats and oils  Butter. Stick margarine. Lard. Shortening. Ghee. Torres fat. Tropical oils, such as coconut, palm kernel, or palm  oil.  Seasoning and other foods  Salted popcorn and pretzels. Onion salt, garlic salt, seasoned salt, table salt, and sea salt. Worcestershire sauce. Tartar sauce. Barbecue sauce. Teriyaki sauce. Soy sauce, including reduced-sodium. Steak sauce. Canned and packaged gravies. Fish sauce. Oyster sauce. Cocktail sauce. Horseradish that you find on the shelf. Ketchup. Mustard. Meat flavorings and tenderizers. Bouillon cubes. Hot sauce and Tabasco sauce. Premade or packaged marinades. Premade or packaged taco seasonings. Relishes. Regular salad dressings.  Where to find more information:  · National Heart, Lung, and Blood [place]: www.nhlbi.nih.gov  · American Heart Association: www.heart.org  Summary  · The DASH eating plan is a healthy eating plan that has been shown to reduce high blood pressure (hypertension). It may also reduce your risk for type 2 diabetes, heart disease, and stroke.  · With the DASH eating plan, you should limit salt (sodium) intake to 2,300 mg a day. If you have hypertension, you may need to reduce your sodium intake to 1,500 mg a day.  · When on the DASH eating plan, aim to eat more fresh fruits and vegetables, whole grains, lean proteins, low-fat dairy, and heart-healthy fats.  · Work with your health care provider or diet and nutrition specialist (dietitian) to adjust your eating plan to your individual calorie needs.  This information is not intended to replace advice given to you by your health care provider. Make sure you discuss any questions you have with your health care provider.  Document Released: 12/06/2012 Document Revised: 12/11/2017 Document Reviewed: 12/11/2017  [employer] Interactive Patient Education © 2019 [employer]

## 2019-04-29 ENCOUNTER — APPOINTMENT (OUTPATIENT)
Dept: ONCOLOGY | Facility: HOSPITAL | Age: 39
End: 2019-04-29

## 2019-05-08 ENCOUNTER — ANTICOAGULATION VISIT (OUTPATIENT)
Dept: CARDIAC SURGERY | Facility: CLINIC | Age: 39
End: 2019-05-08

## 2019-05-08 VITALS — DIASTOLIC BLOOD PRESSURE: 80 MMHG | HEART RATE: 76 BPM | SYSTOLIC BLOOD PRESSURE: 124 MMHG

## 2019-05-08 DIAGNOSIS — Z79.01 LONG TERM (CURRENT) USE OF ANTICOAGULANTS: ICD-10-CM

## 2019-05-08 DIAGNOSIS — D68.59 PROTEIN S DEFICIENCY (HCC): ICD-10-CM

## 2019-05-08 DIAGNOSIS — Z86.718 HISTORY OF DVT OF LOWER EXTREMITY: ICD-10-CM

## 2019-05-08 LAB — INR PPP: 2.6 (ref 0.9–1.1)

## 2019-05-08 PROCEDURE — 85610 PROTHROMBIN TIME: CPT | Performed by: NURSE PRACTITIONER

## 2019-05-08 PROCEDURE — 99211 OFF/OP EST MAY X REQ PHY/QHP: CPT | Performed by: NURSE PRACTITIONER

## 2019-05-08 NOTE — PROGRESS NOTES
Today's INR is 2.6.   Pt states she started Amoxicillin yesterday for 10 days for respiratory and ear infection.  Pt denies bleeding issues.  Adjusted coumadin dose and instructed pt to have her usual green intake.  Recheck INR next Monday.  Pt verbalizes.  Patient instructed regarding medication; results given and questions answered. Nutritional counseling given.  Dietary factors affecting therapy addressed.  Patient instructed to monitor for excessive bruising or bleeding.        This document has been electronically signed by NARDA Guardado @ on May 8, 2019 1:47 PM

## 2019-05-13 ENCOUNTER — DOCUMENTATION (OUTPATIENT)
Dept: CARDIAC SURGERY | Facility: CLINIC | Age: 39
End: 2019-05-13

## 2019-05-15 ENCOUNTER — ANTICOAGULATION VISIT (OUTPATIENT)
Dept: CARDIAC SURGERY | Facility: CLINIC | Age: 39
End: 2019-05-15

## 2019-05-15 VITALS — HEART RATE: 95 BPM | SYSTOLIC BLOOD PRESSURE: 136 MMHG | DIASTOLIC BLOOD PRESSURE: 84 MMHG

## 2019-05-15 DIAGNOSIS — Z79.01 LONG TERM (CURRENT) USE OF ANTICOAGULANTS: ICD-10-CM

## 2019-05-15 DIAGNOSIS — D68.59 PROTEIN S DEFICIENCY (HCC): ICD-10-CM

## 2019-05-15 DIAGNOSIS — Z86.718 HISTORY OF DVT OF LOWER EXTREMITY: ICD-10-CM

## 2019-05-15 LAB — INR PPP: 2.7 (ref 0.9–1.1)

## 2019-05-15 PROCEDURE — 85610 PROTHROMBIN TIME: CPT | Performed by: NURSE PRACTITIONER

## 2019-05-15 NOTE — PROGRESS NOTES
Today's INR is 2.7.   Patient states no med changes or bleeding problems or unexplained bruising. Patient instructed to continue current dosing schedule. Verbalizes understanding. Will recheck in 3 wks.  Patient instructed regarding medication; results given and questions answered. Nutritional counseling given.  Dietary factors affecting therapy addressed.  Patient instructed to monitor for excessive bruising or bleeding.        This document has been electronically signed by NARDA Guardado @ on May 15, 2019 2:46 PM

## 2019-06-05 ENCOUNTER — DOCUMENTATION (OUTPATIENT)
Dept: CARDIAC SURGERY | Facility: CLINIC | Age: 39
End: 2019-06-05

## 2019-06-07 ENCOUNTER — ANTICOAGULATION VISIT (OUTPATIENT)
Dept: CARDIAC SURGERY | Facility: CLINIC | Age: 39
End: 2019-06-07

## 2019-06-07 VITALS — OXYGEN SATURATION: 99 % | HEART RATE: 82 BPM | DIASTOLIC BLOOD PRESSURE: 68 MMHG | SYSTOLIC BLOOD PRESSURE: 114 MMHG

## 2019-06-07 DIAGNOSIS — D68.59 PROTEIN S DEFICIENCY (HCC): ICD-10-CM

## 2019-06-07 DIAGNOSIS — Z86.718 HISTORY OF DVT OF LOWER EXTREMITY: ICD-10-CM

## 2019-06-07 DIAGNOSIS — Z79.01 LONG TERM (CURRENT) USE OF ANTICOAGULANTS: ICD-10-CM

## 2019-06-07 LAB — INR PPP: 2.2 (ref 0.9–1.1)

## 2019-06-07 PROCEDURE — 85610 PROTHROMBIN TIME: CPT | Performed by: NURSE PRACTITIONER

## 2019-06-07 NOTE — PROGRESS NOTES
Today's INR is 2.2.  Pt denies med changes or bleeding problems. Pt states she did miss a dose on Saturday. Pt was instructed to continue current dose and appt made for 1 month; pt verbalized. Patient instructed regarding medication; results given and questions answered. Nutritional counseling given.  Dietary factors affecting therapy addressed.  Patient instructed to monitor for excessive bruising or bleeding.       This document has been electronically signed by Ulises Neves AGACNP-BC @  On June 7, 2019 1:59 PM

## 2019-07-05 ENCOUNTER — ANTICOAGULATION VISIT (OUTPATIENT)
Dept: CARDIAC SURGERY | Facility: CLINIC | Age: 39
End: 2019-07-05

## 2019-07-05 VITALS — HEART RATE: 86 BPM | SYSTOLIC BLOOD PRESSURE: 130 MMHG | DIASTOLIC BLOOD PRESSURE: 92 MMHG | OXYGEN SATURATION: 97 %

## 2019-07-05 DIAGNOSIS — D68.59 PROTEIN S DEFICIENCY (HCC): ICD-10-CM

## 2019-07-05 DIAGNOSIS — Z79.01 LONG TERM (CURRENT) USE OF ANTICOAGULANTS: ICD-10-CM

## 2019-07-05 DIAGNOSIS — Z86.718 HISTORY OF DVT OF LOWER EXTREMITY: ICD-10-CM

## 2019-07-05 LAB — INR PPP: 2.8 (ref 0.9–1.1)

## 2019-07-05 PROCEDURE — 85610 PROTHROMBIN TIME: CPT | Performed by: NURSE PRACTITIONER

## 2019-07-15 RX ORDER — WARFARIN SODIUM 7.5 MG/1
TABLET ORAL
Qty: 45 TABLET | Refills: 1 | Status: SHIPPED | OUTPATIENT
Start: 2019-07-15 | End: 2019-09-30 | Stop reason: SDUPTHER

## 2019-08-02 ENCOUNTER — ANTICOAGULATION VISIT (OUTPATIENT)
Dept: CARDIAC SURGERY | Facility: CLINIC | Age: 39
End: 2019-08-02

## 2019-08-02 VITALS — SYSTOLIC BLOOD PRESSURE: 122 MMHG | HEART RATE: 93 BPM | DIASTOLIC BLOOD PRESSURE: 73 MMHG | OXYGEN SATURATION: 98 %

## 2019-08-02 DIAGNOSIS — D68.59 PROTEIN S DEFICIENCY (HCC): ICD-10-CM

## 2019-08-02 DIAGNOSIS — Z79.01 LONG TERM (CURRENT) USE OF ANTICOAGULANTS: ICD-10-CM

## 2019-08-02 DIAGNOSIS — Z86.718 HISTORY OF DVT OF LOWER EXTREMITY: ICD-10-CM

## 2019-08-02 LAB — INR PPP: 3 (ref 0.9–1.1)

## 2019-08-02 PROCEDURE — 85610 PROTHROMBIN TIME: CPT | Performed by: NURSE PRACTITIONER

## 2019-08-02 NOTE — PROGRESS NOTES
Today's INR is 3.0.  Patient states no med changes or bleeding problems or unexplained bruising. Patient instructed to continue current dosing schedule. Verbalizes understanding. Will recheck 1 month.  Patient instructed regarding medication; results given and questions answered. Nutritional counseling given.  Dietary factors affecting therapy addressed.  Patient instructed to monitor for excessive bruising or bleeding.       This document has been electronically signed by ERI DelvalleCNP-BC Merlyn  On August 2, 2019 1:22 PM

## 2019-08-16 ENCOUNTER — APPOINTMENT (OUTPATIENT)
Dept: ONCOLOGY | Facility: HOSPITAL | Age: 39
End: 2019-08-16

## 2019-08-22 ENCOUNTER — APPOINTMENT (OUTPATIENT)
Dept: ONCOLOGY | Facility: CLINIC | Age: 39
End: 2019-08-22

## 2019-08-30 ENCOUNTER — LAB (OUTPATIENT)
Dept: ONCOLOGY | Facility: HOSPITAL | Age: 39
End: 2019-08-30

## 2019-08-30 ENCOUNTER — OFFICE VISIT (OUTPATIENT)
Dept: ONCOLOGY | Facility: CLINIC | Age: 39
End: 2019-08-30

## 2019-08-30 VITALS
WEIGHT: 243.3 LBS | SYSTOLIC BLOOD PRESSURE: 157 MMHG | HEIGHT: 64 IN | RESPIRATION RATE: 18 BRPM | HEART RATE: 74 BPM | TEMPERATURE: 98.1 F | DIASTOLIC BLOOD PRESSURE: 86 MMHG | BODY MASS INDEX: 41.54 KG/M2

## 2019-08-30 DIAGNOSIS — R91.8 MULTIPLE LUNG NODULES ON CT: ICD-10-CM

## 2019-08-30 DIAGNOSIS — D68.59 PROTEIN S DEFICIENCY (HCC): Chronic | ICD-10-CM

## 2019-08-30 DIAGNOSIS — Z86.718 HISTORY OF DVT OF LOWER EXTREMITY: ICD-10-CM

## 2019-08-30 DIAGNOSIS — Z79.01 LONG TERM (CURRENT) USE OF ANTICOAGULANTS: ICD-10-CM

## 2019-08-30 DIAGNOSIS — D68.59 PROTEIN S DEFICIENCY (HCC): ICD-10-CM

## 2019-08-30 LAB
ALBUMIN SERPL-MCNC: 4.1 G/DL (ref 3.5–5.2)
ALBUMIN/GLOB SERPL: 1.2 G/DL
ALP SERPL-CCNC: 85 U/L (ref 39–117)
ALT SERPL W P-5'-P-CCNC: 17 U/L (ref 1–33)
ANION GAP SERPL CALCULATED.3IONS-SCNC: 12 MMOL/L (ref 5–15)
AST SERPL-CCNC: 15 U/L (ref 1–32)
BASOPHILS # BLD AUTO: 0.06 10*3/MM3 (ref 0–0.2)
BASOPHILS NFR BLD AUTO: 0.8 % (ref 0–1.5)
BILIRUB SERPL-MCNC: 0.4 MG/DL (ref 0.2–1.2)
BUN BLD-MCNC: 14 MG/DL (ref 6–20)
BUN/CREAT SERPL: 18.4 (ref 7–25)
CALCIUM SPEC-SCNC: 9.1 MG/DL (ref 8.6–10.5)
CHLORIDE SERPL-SCNC: 102 MMOL/L (ref 98–107)
CO2 SERPL-SCNC: 25 MMOL/L (ref 22–29)
CREAT BLD-MCNC: 0.76 MG/DL (ref 0.57–1)
DEPRECATED RDW RBC AUTO: 38.9 FL (ref 37–54)
EOSINOPHIL # BLD AUTO: 0.31 10*3/MM3 (ref 0–0.4)
EOSINOPHIL NFR BLD AUTO: 4.1 % (ref 0.3–6.2)
ERYTHROCYTE [DISTWIDTH] IN BLOOD BY AUTOMATED COUNT: 13.2 % (ref 12.3–15.4)
GFR SERPL CREATININE-BSD FRML MDRD: 84 ML/MIN/1.73
GLOBULIN UR ELPH-MCNC: 3.3 GM/DL
GLUCOSE BLD-MCNC: 107 MG/DL (ref 65–99)
HCT VFR BLD AUTO: 41.7 % (ref 34–46.6)
HGB BLD-MCNC: 13.9 G/DL (ref 12–15.9)
IMM GRANULOCYTES # BLD AUTO: 0.02 10*3/MM3 (ref 0–0.05)
IMM GRANULOCYTES NFR BLD AUTO: 0.3 % (ref 0–0.5)
LYMPHOCYTES # BLD AUTO: 1.96 10*3/MM3 (ref 0.7–3.1)
LYMPHOCYTES NFR BLD AUTO: 26.1 % (ref 19.6–45.3)
MCH RBC QN AUTO: 27 PG (ref 26.6–33)
MCHC RBC AUTO-ENTMCNC: 33.3 G/DL (ref 31.5–35.7)
MCV RBC AUTO: 81.1 FL (ref 79–97)
MONOCYTES # BLD AUTO: 0.61 10*3/MM3 (ref 0.1–0.9)
MONOCYTES NFR BLD AUTO: 8.1 % (ref 5–12)
NEUTROPHILS # BLD AUTO: 4.56 10*3/MM3 (ref 1.7–7)
NEUTROPHILS NFR BLD AUTO: 60.6 % (ref 42.7–76)
NRBC BLD AUTO-RTO: 0 /100 WBC (ref 0–0.2)
PLATELET # BLD AUTO: 285 10*3/MM3 (ref 140–450)
PMV BLD AUTO: 9.1 FL (ref 6–12)
POTASSIUM BLD-SCNC: 3.7 MMOL/L (ref 3.5–5.2)
PROT SERPL-MCNC: 7.4 G/DL (ref 6–8.5)
RBC # BLD AUTO: 5.14 10*6/MM3 (ref 3.77–5.28)
SODIUM BLD-SCNC: 139 MMOL/L (ref 136–145)
WBC NRBC COR # BLD: 7.52 10*3/MM3 (ref 3.4–10.8)

## 2019-08-30 PROCEDURE — 99213 OFFICE O/P EST LOW 20 MIN: CPT | Performed by: NURSE PRACTITIONER

## 2019-08-30 PROCEDURE — 85025 COMPLETE CBC W/AUTO DIFF WBC: CPT | Performed by: INTERNAL MEDICINE

## 2019-08-30 PROCEDURE — 80053 COMPREHEN METABOLIC PANEL: CPT | Performed by: INTERNAL MEDICINE

## 2019-08-30 PROCEDURE — G0463 HOSPITAL OUTPT CLINIC VISIT: HCPCS | Performed by: NURSE PRACTITIONER

## 2019-09-04 ENCOUNTER — ANTICOAGULATION VISIT (OUTPATIENT)
Dept: CARDIAC SURGERY | Facility: CLINIC | Age: 39
End: 2019-09-04

## 2019-09-04 VITALS — SYSTOLIC BLOOD PRESSURE: 133 MMHG | OXYGEN SATURATION: 98 % | DIASTOLIC BLOOD PRESSURE: 90 MMHG | HEART RATE: 78 BPM

## 2019-09-04 DIAGNOSIS — D68.59 PROTEIN S DEFICIENCY (HCC): ICD-10-CM

## 2019-09-04 DIAGNOSIS — Z79.01 LONG TERM (CURRENT) USE OF ANTICOAGULANTS: ICD-10-CM

## 2019-09-04 DIAGNOSIS — Z86.718 HISTORY OF DVT OF LOWER EXTREMITY: ICD-10-CM

## 2019-09-04 LAB — INR PPP: 1.3 (ref 0.9–1.1)

## 2019-09-04 PROCEDURE — 85610 PROTHROMBIN TIME: CPT | Performed by: NURSE PRACTITIONER

## 2019-09-04 PROCEDURE — 99211 OFF/OP EST MAY X REQ PHY/QHP: CPT | Performed by: NURSE PRACTITIONER

## 2019-09-04 NOTE — PROGRESS NOTES
DATE OF VISIT: 2019    REASON FOR VISIT: History of right lower extremity DVT and protein S deficiency ,lung nodules        HISTORY OF PRESENT ILLNESS:    38-year-old female with a past medical history significant for right lower extremity DVT which was initially diagnosed in  immediately after childbirth for which she took blood thinner for 6 month, patient had a recurrence of DVT in  again after childbirth for which she took blood thinner for 6 month.  At some point she was tested positive for protein S deficiency.  Patient also has a strong family history of blood clots in mother, sister as well as grandparents.  Patient was initially seen in consultation on 2017 and had hypercoagulable workup done which showed type III protein S deficiency.  Patient was recommended to start anticoagulation but she was reluctant to start anticoagulation at this point and she was lost to follow-up after 2017. Patient was seen again at [facility] in 2019 and anticoagulation was started.  She is currently on Coumadin being followed by Coumadin clinic.    Denies any swelling of lower extremity or any new shortness of breath.  Denies any fevers or chills or night sweats.        PAST MEDICAL HISTORY:    Past Medical History:   Diagnosis Date   • Dvt femoral (deep venous thrombosis) (CMS/[facility])        SOCIAL HISTORY:    Social History     Tobacco Use   • Smoking status: Former Smoker     Last attempt to quit: 2015     Years since quittin.1   • Smokeless tobacco: Never Used   Substance Use Topics   • Alcohol use: No     Comment: special occasions   • Drug use: No       Surgical History :  Past Surgical History:   Procedure Laterality Date   •  SECTION     • CHOLECYSTECTOMY     • TUBAL ABDOMINAL LIGATION Bilateral     2016       ALLERGIES:    No Known Allergies    REVIEW OF SYSTEMS:      CONSTITUTIONAL:  No fever, chills, or night sweats.     HEENT:  No epistaxis, mouth sores, or  "difficulty swallowing.    RESPIRATORY:  No new shortness of breath or cough at present.    CARDIOVASCULAR:  No chest pain or palpitations.    GASTROINTESTINAL:  No abdominal pain, nausea, vomiting, or blood in the stool.    GENITOURINARY:  No dysuria or hematuria.    MUSCULOSKELETAL:  No any new back pain or arthralgias.     NEUROLOGICAL:  No tingling or numbness. No new headache or dizziness.     LYMPHATICS:  Denies any abnormal swollen and anywhere in the body.    SKIN:  Denies any new skin rash.    PHYSICAL EXAMINATION:      VITAL SIGNS:  /86   Pulse 74   Temp 98.1 °F (36.7 °C) (Temporal)   Resp 18   Ht 162.6 cm (64.02\")   Wt 110 kg (243 lb 4.8 oz)   BMI 41.74 kg/m²     GENERAL:  Not in any distress.    HEENT:  Normocephalic, Atraumatic.Mild Conjunctival pallor. No icterus. Extraocular Movements Intact. No Facial Asymmetry noted.    NECK:  No adenopathy. No JVD.    RESPIRATORY:  Fair air entry bilateral. No rhonchi or wheezing.    CARDIOVASCULAR:  S1, S2. Regular rate and rhythm. No murmur or gallop appreciated.    ABDOMEN:  Soft, obese, nontender. Bowel sounds present in all four quadrants.  No organomegaly appreciated.    EXTREMITIES:  No edema.No Calf Tenderness.    NEUROLOGIC:  Alert, awake and oriented ×3.  No  Motor or sensory deficit appreciated. Cranial Nerves 2-12 grossly intact.    SKIN : No new skin lesion identified  DIAGNOSTIC DATA:    Glucose   Date Value Ref Range Status   08/30/2019 107 (H) 65 - 99 mg/dL Final     Sodium   Date Value Ref Range Status   08/30/2019 139 136 - 145 mmol/L Final     Potassium   Date Value Ref Range Status   08/30/2019 3.7 3.5 - 5.2 mmol/L Final     CO2   Date Value Ref Range Status   08/30/2019 25.0 22.0 - 29.0 mmol/L Final     Chloride   Date Value Ref Range Status   08/30/2019 102 98 - 107 mmol/L Final     Anion Gap   Date Value Ref Range Status   08/30/2019 12.0 5.0 - 15.0 mmol/L Final     Creatinine   Date Value Ref Range Status   08/30/2019 0.76 0.57 - " 1.00 mg/dL Final     BUN   Date Value Ref Range Status   08/30/2019 14 6 - 20 mg/dL Final     BUN/Creatinine Ratio   Date Value Ref Range Status   08/30/2019 18.4 7.0 - 25.0 Final     Calcium   Date Value Ref Range Status   08/30/2019 9.1 8.6 - 10.5 mg/dL Final     eGFR Non  Amer   Date Value Ref Range Status   08/30/2019 84 >60 mL/min/1.73 Final     Alkaline Phosphatase   Date Value Ref Range Status   08/30/2019 85 39 - 117 U/L Final     Total Protein   Date Value Ref Range Status   08/30/2019 7.4 6.0 - 8.5 g/dL Final     ALT (SGPT)   Date Value Ref Range Status   08/30/2019 17 1 - 33 U/L Final     AST (SGOT)   Date Value Ref Range Status   08/30/2019 15 1 - 32 U/L Final     Total Bilirubin   Date Value Ref Range Status   08/30/2019 0.4 0.2 - 1.2 mg/dL Final     Albumin   Date Value Ref Range Status   08/30/2019 4.10 3.50 - 5.20 g/dL Final     Globulin   Date Value Ref Range Status   08/30/2019 3.3 gm/dL Final     Lab Results   Component Value Date    WBC 7.52 08/30/2019    HGB 13.9 08/30/2019    HCT 41.7 08/30/2019    MCV 81.1 08/30/2019     08/30/2019     Lab Results   Component Value Date    NEUTROABS 4.56 08/30/2019    IRON 38 07/31/2017    TIBC 338 07/31/2017    LABIRON 11 (L) 07/31/2017    FERRITIN 46.70 07/31/2017    IRLREBXR41 294 07/31/2017    FOLATE 16.50 07/31/2017     No results found for: , LABCA2, AFPTM, HCGQUANT, , CHROMGRNA, 1WJUX29SXK, CEA, REFLABREPO]  Protein C Activity       Ref Range & Units 3wk ago      Prt C Activity (Chromogenic) % 97   Comments: Reference Range:   17 years and older: 73 - 180   Resulting Agency   [facility]   Narrative   Performed at:  02 - [facility]  [address]  [id number]  : Ml Arteaga MD, Phone:  [phone]      Specimen Collected: 07/31/17  4:26 PM Last Resulted: 08/04/17                      Protein C & S, Functional       Ref Range & Units 3wk ago      Protein C-Functional 73 - 180 % 86    Protein S-Functional 63 - 140 % 58 (L)               Protein C & S Antigens   Order: [id number]   Status:  Final result   Visible to patient:  No (Not Released) Dx:  Protein S deficiency       Ref Range & Units 3wk ago      Protein C Antigen 60 - 150 % 79   Protein S Ag, Total 60 - 150 % 85   Comments: This test was developed and its performance characteristics   determined by [facility]. It has not been cleared or approved   by the Food and Drug Administration.   Protein S Ag, Free 57 - 157 % 31 (L)            Rest of the hypercoagulable testing with lupus anticoagulant, anticardiolipin antibodies, factor V Leyden mutation, antithrombin III level and prothrombin gene mutation were negative on July 31, 2017.           RADIOLOGY DATA:  CT chest with contrast done on January 21, 2019 showed:  IMPRESSION:  CONCLUSION:  Bilateral nodules with faint rims of peripheral calcification,  more likely due to an infectious or inflammatory process than  neoplasm. Recommend follow-up chest CT in three months to assess  for stability. These could also be further assessed with PET/CT  or percutaneous biopsy of the largest lesion.           ASSESSMENT AND PLAN:       1.  Protein S deficiency:  - Based on the hypercoagulable workup done on July 31, 2017 showing has a type III protein S deficiency with normal total protein S antigen, and decreasing protein S antigen free as well as protein S functional.    -Since patient has a history of DVT ×2 first time in 1998 and second time in 2006 both time after childbirth she took Coumadin for 6 month on each occasion.  Since patient has a very strong family history of DVT and pulmonary embolism, her protein S deficiency is worrisome for recurrence of DVT.  She does not have blood clot since 2006.    -In August 2017, for anticoagulation was recommended to patient.  However she was not willing to do anticoagulation at that point.  She was also offered to go for second opinion, she did not do that  secondary to financial constraints.  -Now patient is interested in taking anticoagulation again.  -Due to 2 episode of DVT and type III protein S deficiency, anticoagulation option consisting of Lovenox with Coumadin or newer anticoagulation like Apixaban or xarelto were discussed with patient.  -She is interested in taking Coumadin again.  -Patient was started back on anticoagulation Coumadin on January 29, 2019.  -She is currently being followed by Coumadin clinic for INR check.  -We will ask patient to return to clinic in 4 months with a repeat CBC and CMP to be done on that day.  -Patient was instructed to come to the emergency room if she starts having any bleeding complication.       2.  History of 3 miscarriages before ninth week of pregnancy.  Lupus anticoagulant as well as anticardiolipin antibodies has been negative.     3.  Multiple lung nodules:  -Patient was found to have multiple lung nodules on a CT scan done in January 2019.  Nodules does have a calcification, most likely benign etiology.  -Patient had follow-up CT chest and appt with Dr. Bonilla in April;  Chad Machado reviewed CT and showed stable calcified granulomas and no new concerning nodules.She felt no further surveillance is necessary at this time.        4. Health maintenance: Patient does not smoke.  She is only 38 years of age and not due for colonoscopy at this point.          This document has been signed by PEDRO Anglin on September 4, 2019 8:50 AM

## 2019-09-04 NOTE — PROGRESS NOTES
Today's INR is 1.3.  Pt denies med changes or bleeding problems. Pt states she missed a dose Saturday night. Pt states she has not eaten any green veggies since the week before last. Dose adjusted, pt instructed to take todays coumadin dose when she gets home, and hold green veggies for 5 days; pt verbalized. Patient instructed regarding medication; results given and questions answered. Nutritional counseling given.  Dietary factors affecting therapy addressed.  Patient instructed to monitor for excessive bruising or bleeding. Will recheck next week.      This document has been electronically signed by ERI DelvalleCNCONCHITA-BC @  On September 4, 2019 1:14 PM

## 2019-09-10 ENCOUNTER — ANTICOAGULATION VISIT (OUTPATIENT)
Dept: CARDIAC SURGERY | Facility: CLINIC | Age: 39
End: 2019-09-10

## 2019-09-10 VITALS — HEART RATE: 89 BPM | DIASTOLIC BLOOD PRESSURE: 80 MMHG | SYSTOLIC BLOOD PRESSURE: 130 MMHG | OXYGEN SATURATION: 98 %

## 2019-09-10 DIAGNOSIS — D68.59 PROTEIN S DEFICIENCY (HCC): ICD-10-CM

## 2019-09-10 DIAGNOSIS — Z79.01 LONG TERM (CURRENT) USE OF ANTICOAGULANTS: ICD-10-CM

## 2019-09-10 DIAGNOSIS — Z86.718 HISTORY OF DVT OF LOWER EXTREMITY: ICD-10-CM

## 2019-09-10 LAB — INR PPP: 1.8 (ref 0.9–1.1)

## 2019-09-10 PROCEDURE — 99211 OFF/OP EST MAY X REQ PHY/QHP: CPT | Performed by: NURSE PRACTITIONER

## 2019-09-10 PROCEDURE — 85610 PROTHROMBIN TIME: CPT | Performed by: NURSE PRACTITIONER

## 2019-09-17 ENCOUNTER — ANTICOAGULATION VISIT (OUTPATIENT)
Dept: CARDIAC SURGERY | Facility: CLINIC | Age: 39
End: 2019-09-17

## 2019-09-17 VITALS — DIASTOLIC BLOOD PRESSURE: 74 MMHG | SYSTOLIC BLOOD PRESSURE: 108 MMHG | HEART RATE: 95 BPM | OXYGEN SATURATION: 96 %

## 2019-09-17 DIAGNOSIS — Z86.718 HISTORY OF DVT OF LOWER EXTREMITY: ICD-10-CM

## 2019-09-17 DIAGNOSIS — D68.59 PROTEIN S DEFICIENCY (HCC): ICD-10-CM

## 2019-09-17 DIAGNOSIS — Z79.01 LONG TERM (CURRENT) USE OF ANTICOAGULANTS: ICD-10-CM

## 2019-09-17 LAB — INR PPP: 2.8 (ref 0.9–1.1)

## 2019-09-17 PROCEDURE — 85610 PROTHROMBIN TIME: CPT | Performed by: NURSE PRACTITIONER

## 2019-09-17 PROCEDURE — 99211 OFF/OP EST MAY X REQ PHY/QHP: CPT | Performed by: NURSE PRACTITIONER

## 2019-09-17 NOTE — PROGRESS NOTES
Today's INR is 2.8.   Pt states no changes to meds or diet.  No bleeding issues.  Pt will resume previous coumadin dose prior to subtherapeutic levels.  Recheck INR next wk.  Pt verbalizes.  Patient instructed regarding medication; results given and questions answered. Nutritional counseling given.  Dietary factors affecting therapy addressed.  Patient instructed to monitor for excessive bruising or bleeding.      This document has been electronically signed by SAIDA Delvalle-BC Merlyn  On September 17, 2019 1:36 PM

## 2019-09-24 ENCOUNTER — ANTICOAGULATION VISIT (OUTPATIENT)
Dept: CARDIAC SURGERY | Facility: CLINIC | Age: 39
End: 2019-09-24

## 2019-09-24 VITALS — HEART RATE: 80 BPM | SYSTOLIC BLOOD PRESSURE: 124 MMHG | DIASTOLIC BLOOD PRESSURE: 82 MMHG

## 2019-09-24 DIAGNOSIS — Z86.718 HISTORY OF DVT OF LOWER EXTREMITY: ICD-10-CM

## 2019-09-24 DIAGNOSIS — Z79.01 LONG TERM (CURRENT) USE OF ANTICOAGULANTS: ICD-10-CM

## 2019-09-24 DIAGNOSIS — D68.59 PROTEIN S DEFICIENCY (HCC): ICD-10-CM

## 2019-09-24 LAB — INR PPP: 3 (ref 0.9–1.1)

## 2019-09-24 PROCEDURE — 85610 PROTHROMBIN TIME: CPT | Performed by: NURSE PRACTITIONER

## 2019-09-24 NOTE — PROGRESS NOTES
Today's INR is 3.0.   Patient states no med changes or bleeding problems or unexplained bruising. Patient instructed to continue current dosing schedule. Verbalizes understanding. Will recheck in 2 wks.  Patient instructed regarding medication; results given and questions answered. Nutritional counseling given.  Dietary factors affecting therapy addressed.  Patient instructed to monitor for excessive bruising or bleeding.        This document has been electronically signed by ANTWON GuardadoPBBLANCA @ on September 24, 2019 1:20 PM

## 2019-09-30 RX ORDER — WARFARIN SODIUM 7.5 MG/1
TABLET ORAL
Qty: 90 TABLET | Refills: 0 | Status: SHIPPED | OUTPATIENT
Start: 2019-09-30 | End: 2021-04-09 | Stop reason: HOSPADM

## 2019-10-08 ENCOUNTER — ANTICOAGULATION VISIT (OUTPATIENT)
Dept: CARDIAC SURGERY | Facility: CLINIC | Age: 39
End: 2019-10-08

## 2019-10-08 VITALS — DIASTOLIC BLOOD PRESSURE: 74 MMHG | HEART RATE: 76 BPM | SYSTOLIC BLOOD PRESSURE: 110 MMHG

## 2019-10-08 DIAGNOSIS — Z86.718 HISTORY OF DVT OF LOWER EXTREMITY: ICD-10-CM

## 2019-10-08 DIAGNOSIS — Z79.01 LONG TERM (CURRENT) USE OF ANTICOAGULANTS: ICD-10-CM

## 2019-10-08 DIAGNOSIS — D68.59 PROTEIN S DEFICIENCY (HCC): ICD-10-CM

## 2019-10-08 LAB — INR PPP: 2.7 (ref 0.9–1.1)

## 2019-10-08 PROCEDURE — 85610 PROTHROMBIN TIME: CPT | Performed by: NURSE PRACTITIONER

## 2019-10-08 NOTE — PROGRESS NOTES
Today's INR is 2.7.   Patient states no med changes or bleeding problems or unexplained bruising. Patient instructed to continue current dosing schedule. Verbalizes understanding. Will recheck in 3 wks.  Patient instructed regarding medication; results given and questions answered. Nutritional counseling given.  Dietary factors affecting therapy addressed.  Patient instructed to monitor for excessive bruising or bleeding.      This document has been electronically signed by Ulises Neves AGACNP-BC Merlyn  On October 8, 2019 1:22 PM

## 2019-10-29 ENCOUNTER — ANTICOAGULATION VISIT (OUTPATIENT)
Dept: CARDIAC SURGERY | Facility: CLINIC | Age: 39
End: 2019-10-29

## 2019-10-29 VITALS — OXYGEN SATURATION: 99 % | HEART RATE: 81 BPM

## 2019-10-29 DIAGNOSIS — D68.59 PROTEIN S DEFICIENCY (HCC): ICD-10-CM

## 2019-10-29 DIAGNOSIS — Z86.718 HISTORY OF DVT OF LOWER EXTREMITY: ICD-10-CM

## 2019-10-29 DIAGNOSIS — Z79.01 LONG TERM (CURRENT) USE OF ANTICOAGULANTS: ICD-10-CM

## 2019-10-29 LAB — INR PPP: 2.8 (ref 0.9–1.1)

## 2019-10-29 PROCEDURE — 99211 OFF/OP EST MAY X REQ PHY/QHP: CPT | Performed by: NURSE PRACTITIONER

## 2019-10-29 PROCEDURE — 85610 PROTHROMBIN TIME: CPT | Performed by: NURSE PRACTITIONER

## 2019-10-29 NOTE — PROGRESS NOTES
Today's INR is 2.8.  Pt states she will be losing insurance on November 1 and is unsure how often she can come to [facility]. Pt states she has called and left messages for Dr Coppola but has not heard back from anyone. Pt states Dr Coppola had mentioned Xarelto and Eliquis in the beginning but she was unfamiliar with them and was going to have a high copay so she went with Coumadin as she has taken it before. I spoke to PEDRO Guardado who advised ordering pt Xarelto 20mg po daily in the evening with a meal using 30 day free card and scheduling her to see Radha and bring in her tax return from last year. Radha also advised pt stop coumadin and eat extra green veggies today and begin Xarelto tomorrow; pt verbalized those instructions.   Xarelto 20mg po daily in the evening with meal #30 with NR was called in to [facility] pharmacy with free card. Pt was instructed to take Xarelto in the evening with food, appt made for pt to see Radha next month, and to bring in tax return asap so she can send it off; pt verbalized. Will hold off resolving episode until we see if pt qualifies for Xarelto assistance.         This document has been electronically signed by NARDA Guardado @ on October 29, 2019 2:47 PM

## 2019-12-25 ENCOUNTER — APPOINTMENT (OUTPATIENT)
Dept: ULTRASOUND IMAGING | Facility: HOSPITAL | Age: 39
End: 2019-12-25

## 2019-12-25 ENCOUNTER — HOSPITAL ENCOUNTER (EMERGENCY)
Facility: HOSPITAL | Age: 39
Discharge: HOME | End: 2019-12-25
Attending: EMERGENCY MEDICINE | Admitting: EMERGENCY MEDICINE

## 2019-12-25 VITALS
SYSTOLIC BLOOD PRESSURE: 150 MMHG | BODY MASS INDEX: 41.47 KG/M2 | DIASTOLIC BLOOD PRESSURE: 91 MMHG | HEIGHT: 64 IN | RESPIRATION RATE: 18 BRPM | WEIGHT: 242.9 LBS | TEMPERATURE: 97.7 F | HEART RATE: 76 BPM | OXYGEN SATURATION: 97 %

## 2019-12-25 DIAGNOSIS — R22.42 LOCALIZED SWELLING OF LEFT LOWER EXTREMITY: Primary | ICD-10-CM

## 2019-12-25 DIAGNOSIS — S80.12XA CONTUSION OF LEFT LOWER EXTREMITY, INITIAL ENCOUNTER: ICD-10-CM

## 2019-12-25 LAB
ANION GAP SERPL CALCULATED.3IONS-SCNC: 9 MMOL/L (ref 5–15)
APTT PPP: 29.3 SECONDS (ref 20–40.3)
BASOPHILS # BLD AUTO: 0.06 10*3/MM3 (ref 0–0.2)
BASOPHILS NFR BLD AUTO: 0.9 % (ref 0–1.5)
BILIRUB UR QL STRIP: NEGATIVE
BUN BLD-MCNC: 14 MG/DL (ref 6–20)
BUN/CREAT SERPL: 13.9 (ref 7–25)
CALCIUM SPEC-SCNC: 9 MG/DL (ref 8.6–10.5)
CHLORIDE SERPL-SCNC: 105 MMOL/L (ref 98–107)
CLARITY UR: CLEAR
CO2 SERPL-SCNC: 26 MMOL/L (ref 22–29)
COLOR UR: YELLOW
CREAT BLD-MCNC: 1.01 MG/DL (ref 0.57–1)
DEPRECATED RDW RBC AUTO: 38.1 FL (ref 37–54)
EOSINOPHIL # BLD AUTO: 0.39 10*3/MM3 (ref 0–0.4)
EOSINOPHIL NFR BLD AUTO: 5.8 % (ref 0.3–6.2)
ERYTHROCYTE [DISTWIDTH] IN BLOOD BY AUTOMATED COUNT: 12.6 % (ref 12.3–15.4)
GFR SERPL CREATININE-BSD FRML MDRD: 61 ML/MIN/1.73
GLUCOSE BLD-MCNC: 108 MG/DL (ref 65–99)
GLUCOSE UR STRIP-MCNC: NEGATIVE MG/DL
HCT VFR BLD AUTO: 42.2 % (ref 34–46.6)
HGB BLD-MCNC: 13.9 G/DL (ref 12–15.9)
HGB UR QL STRIP.AUTO: NEGATIVE
HOLD SPECIMEN: NORMAL
IMM GRANULOCYTES # BLD AUTO: 0.01 10*3/MM3 (ref 0–0.05)
IMM GRANULOCYTES NFR BLD AUTO: 0.1 % (ref 0–0.5)
INR PPP: 1.04 (ref 0.8–1.2)
KETONES UR QL STRIP: NEGATIVE
LEUKOCYTE ESTERASE UR QL STRIP.AUTO: NEGATIVE
LYMPHOCYTES # BLD AUTO: 1.98 10*3/MM3 (ref 0.7–3.1)
LYMPHOCYTES NFR BLD AUTO: 29.2 % (ref 19.6–45.3)
MCH RBC QN AUTO: 27.4 PG (ref 26.6–33)
MCHC RBC AUTO-ENTMCNC: 32.9 G/DL (ref 31.5–35.7)
MCV RBC AUTO: 83.2 FL (ref 79–97)
MONOCYTES # BLD AUTO: 0.49 10*3/MM3 (ref 0.1–0.9)
MONOCYTES NFR BLD AUTO: 7.2 % (ref 5–12)
NEUTROPHILS # BLD AUTO: 3.85 10*3/MM3 (ref 1.7–7)
NEUTROPHILS NFR BLD AUTO: 56.8 % (ref 42.7–76)
NITRITE UR QL STRIP: NEGATIVE
NRBC BLD AUTO-RTO: 0 /100 WBC (ref 0–0.2)
PH UR STRIP.AUTO: 7 [PH] (ref 5–9)
PLATELET # BLD AUTO: 292 10*3/MM3 (ref 140–450)
PMV BLD AUTO: 9 FL (ref 6–12)
POTASSIUM BLD-SCNC: 4 MMOL/L (ref 3.5–5.2)
PROT UR QL STRIP: NEGATIVE
PROTHROMBIN TIME: 13.4 SECONDS (ref 11.1–15.3)
RBC # BLD AUTO: 5.07 10*6/MM3 (ref 3.77–5.28)
SODIUM BLD-SCNC: 140 MMOL/L (ref 136–145)
SP GR UR STRIP: 1.02 (ref 1–1.03)
UROBILINOGEN UR QL STRIP: NORMAL
WBC NRBC COR # BLD: 6.78 10*3/MM3 (ref 3.4–10.8)

## 2019-12-25 PROCEDURE — 85610 PROTHROMBIN TIME: CPT | Performed by: EMERGENCY MEDICINE

## 2019-12-25 PROCEDURE — 93971 EXTREMITY STUDY: CPT

## 2019-12-25 PROCEDURE — 99283 EMERGENCY DEPT VISIT LOW MDM: CPT

## 2019-12-25 PROCEDURE — 85025 COMPLETE CBC W/AUTO DIFF WBC: CPT | Performed by: EMERGENCY MEDICINE

## 2019-12-25 PROCEDURE — 80048 BASIC METABOLIC PNL TOTAL CA: CPT | Performed by: EMERGENCY MEDICINE

## 2019-12-25 PROCEDURE — 81003 URINALYSIS AUTO W/O SCOPE: CPT | Performed by: EMERGENCY MEDICINE

## 2019-12-25 PROCEDURE — 85730 THROMBOPLASTIN TIME PARTIAL: CPT | Performed by: EMERGENCY MEDICINE

## 2019-12-25 RX ORDER — RIVAROXABAN 20 MG/1
20 TABLET, FILM COATED ORAL DAILY
Refills: 0 | COMMUNITY
Start: 2019-10-29 | End: 2021-04-09 | Stop reason: HOSPADM

## 2019-12-25 RX ORDER — ORPHENADRINE CITRATE 100 MG/1
100 TABLET, EXTENDED RELEASE ORAL 2 TIMES DAILY PRN
Qty: 15 TABLET | Refills: 0 | Status: SHIPPED | OUTPATIENT
Start: 2019-12-25 | End: 2021-04-09 | Stop reason: HOSPADM

## 2019-12-25 RX ORDER — SODIUM CHLORIDE 0.9 % (FLUSH) 0.9 %
10 SYRINGE (ML) INJECTION AS NEEDED
Status: DISCONTINUED | OUTPATIENT
Start: 2019-12-25 | End: 2019-12-25 | Stop reason: HOSPADM

## 2019-12-25 NOTE — ED PROVIDER NOTES
Subjective   Patient presents emergency department complaint of right lower extremity pain.  Patient states she has had pain for approximately 1 week and increased swelling is been started in the distal calf area and is radiated up to the popliteal posteriorly..  Patient has a history of protein S deficiency and has been on anticoagulation, both Coumadin, and then later Xarelto for her DVTs.  Patient's last DVT was in 2006.  Patient notes that she has been free of clot even before restarting her anticoagulation medication but was thought to be at high risk and so she was placed on medication.  Patient been off the medication for about 10 days as she ran out and was unable to get the medication filled.  Patient denies any kind of respiratory symptoms with this, no chest pain, no shortness of breath, no heart racing or palpitations.  Patient states her blood pressure is up at this time though that is what she calls secondary to her life stressors.          Review of Systems   Constitutional: Negative.  Negative for appetite change, chills and fever.   HENT: Negative.  Negative for congestion.    Eyes: Negative.  Negative for photophobia and visual disturbance.   Respiratory: Negative.  Negative for cough, chest tightness and shortness of breath.    Cardiovascular: Positive for leg swelling. Negative for chest pain and palpitations.   Gastrointestinal: Negative.  Negative for abdominal pain, constipation, diarrhea, nausea and vomiting.   Endocrine: Negative.    Genitourinary: Negative.  Negative for decreased urine volume, dysuria, flank pain and hematuria.   Musculoskeletal: Positive for myalgias. Negative for arthralgias, back pain, neck pain and neck stiffness.   Skin: Negative.  Negative for pallor.   Neurological: Negative.  Negative for dizziness, syncope, weakness, light-headedness, numbness and headaches.   Psychiatric/Behavioral: Negative.  Negative for confusion and suicidal ideas. The patient is not  nervous/anxious.    All other systems reviewed and are negative.      Past Medical History:   Diagnosis Date   • Dvt femoral (deep venous thrombosis) (CMS/HCC)        No Known Allergies    Past Surgical History:   Procedure Laterality Date   •  SECTION     • CHOLECYSTECTOMY     • TUBAL ABDOMINAL LIGATION Bilateral     2016       Family History   Problem Relation Age of Onset   • Hypertension Mother    • Cancer Paternal Grandmother    • Heart disease Paternal Grandfather        Social History     Socioeconomic History   • Marital status:      Spouse name: Not on file   • Number of children: Not on file   • Years of education: Not on file   • Highest education level: Not on file   Tobacco Use   • Smoking status: Former Smoker     Last attempt to quit: 2015     Years since quittin.4   • Smokeless tobacco: Never Used   Substance and Sexual Activity   • Alcohol use: No     Comment: special occasions   • Drug use: No   • Sexual activity: Yes     Partners: Male     Birth control/protection: Surgical           Objective   Physical Exam   Constitutional: She is oriented to person, place, and time. She appears well-developed and well-nourished. No distress.   HENT:   Head: Normocephalic and atraumatic.   Nose: Nose normal.   Mouth/Throat: Oropharynx is clear and moist.   Eyes: Conjunctivae and EOM are normal. No scleral icterus.   Neck: Normal range of motion. Neck supple. No JVD present.   Cardiovascular: Normal rate, regular rhythm, normal heart sounds and intact distal pulses. Exam reveals no gallop and no friction rub.   No murmur heard.  Pulmonary/Chest: Effort normal. No respiratory distress. She has no wheezes. She has no rales. She exhibits no tenderness.   Abdominal: Soft. She exhibits no distension and no mass. There is no tenderness. There is no rebound and no guarding.   Musculoskeletal: Normal range of motion. She exhibits tenderness. She exhibits no edema or deformity.   There is  tenderness to the medial calf as well as fullness in this area, the tenderness does proceed up to the popliteal.  There is warmth on the inner calf region consistent with possible clot.  There is no definitive palpable cord, no tenderness in the medial thigh.  No warmth in this region.   Lymphadenopathy:     She has no cervical adenopathy.   Neurological: She is alert and oriented to person, place, and time. No cranial nerve deficit. She exhibits normal muscle tone.   Skin: Skin is warm and dry. Capillary refill takes less than 2 seconds. No rash noted. She is not diaphoretic. No erythema. No pallor.   Psychiatric: She has a normal mood and affect. Her behavior is normal. Judgment and thought content normal.   Nursing note and vitals reviewed.      Procedures           ED Course                      [place] Coma Scale Score: 15                Labs Reviewed   BASIC METABOLIC PANEL - Abnormal; Notable for the following components:       Result Value    Glucose 108 (*)     Creatinine 1.01 (*)     All other components within normal limits    Narrative:     GFR Normal >60  Chronic Kidney Disease <60  Kidney Failure <15     PROTIME-INR - Normal    Narrative:     Therapeutic range for most indications is 2.0-3.0 INR,  or 2.5-3.5 for mechanical heart valves.   APTT - Normal    Narrative:     The recommended Heparin therapeutic range is 68-97 seconds.   URINALYSIS W/ MICROSCOPIC IF INDICATED (NO CULTURE) - Normal    Narrative:     Urine microscopic not indicated.   CBC WITH AUTO DIFFERENTIAL - Normal   CBC AND DIFFERENTIAL    Narrative:     The following orders were created for panel order CBC & Differential.  Procedure                               Abnormality         Status                     ---------                               -----------         ------                     CBC Auto Differential[[id number]]        Normal              Final result                 Please view results for these tests on the individual  orders.   EXTRA TUBES    Narrative:     The following orders were created for panel order Extra Tubes.  Procedure                               Abnormality         Status                     ---------                               -----------         ------                     Gold Top - SST[[id number]]                                   In process                   Please view results for these tests on the individual orders.   [facility]       US Venous Doppler Lower Extremity Right (duplex)   Final Result   CONCLUSION:   No ultrasound evidence of deep venous thrombosis of the right   lower extremity.      [id number]      Electronically signed by:  Aguila Bonilla MD  12/25/2019 3:32 PM   [facility] Workstation: [id number]        No DVT.  Patient will be treated symptomatically with outpatient follow-up.  Patient is restarting her Xarelto.          [facility]    Final diagnoses:   Localized swelling of left lower extremity   Contusion of left lower extremity, initial encounter            Live Berry MD  12/25/19 [id number]

## 2019-12-25 NOTE — ED TRIAGE NOTES
Pt presents to ED with C/O right foot/leg pain, swelling, and warmth. Pt states she started a new blood thinner in October and has several doses of medication in the last few weeks. Pt states she has hx of DVT in right leg several years ago.

## 2019-12-27 ENCOUNTER — ANTICOAGULATION VISIT (OUTPATIENT)
Dept: CARDIAC SURGERY | Facility: CLINIC | Age: 39
End: 2019-12-27

## 2019-12-27 DIAGNOSIS — Z86.718 HISTORY OF DVT OF LOWER EXTREMITY: ICD-10-CM

## 2019-12-27 DIAGNOSIS — Z79.01 LONG TERM (CURRENT) USE OF ANTICOAGULANTS: ICD-10-CM

## 2019-12-27 DIAGNOSIS — D68.59 PROTEIN S DEFICIENCY (HCC): ICD-10-CM

## 2019-12-27 NOTE — PROGRESS NOTES
I spoke to pt who stated she was able to get assistance from Xarelto through her PCP and Dr Tami Esparza is prescribing Xarelto for her. Will resolve episode.

## 2020-02-25 DIAGNOSIS — R91.8 MULTIPLE LUNG NODULES ON CT: ICD-10-CM

## 2020-02-25 DIAGNOSIS — D68.59 PROTEIN S DEFICIENCY (HCC): ICD-10-CM

## 2020-02-25 DIAGNOSIS — Z86.718 HISTORY OF DVT OF LOWER EXTREMITY: Primary | ICD-10-CM

## 2020-02-28 ENCOUNTER — APPOINTMENT (OUTPATIENT)
Dept: ONCOLOGY | Facility: HOSPITAL | Age: 40
End: 2020-02-28

## 2020-04-20 ENCOUNTER — TELEPHONE (OUTPATIENT)
Dept: ONCOLOGY | Facility: CLINIC | Age: 40
End: 2020-04-20

## 2020-04-20 DIAGNOSIS — Z86.718 HISTORY OF DVT OF LOWER EXTREMITY: Primary | ICD-10-CM

## 2020-04-20 NOTE — TELEPHONE ENCOUNTER
Patient wants to make appointment with Dr. Coppola.    Was seen in hospital in December for bruise.Ultrasound showed didn't have DVT.   It has not gone away, but has gotten worse.  It hurts, gets warm, swells and is warm, red and swollen today.    Tried to warm transfer, but was told to put in message.    Please call her at  [phone]

## 2020-04-20 NOTE — TELEPHONE ENCOUNTER
Informed patient of information. Gave date/time of appointment tomorrow. She verbalized understanding.

## 2020-04-21 ENCOUNTER — TELEPHONE (OUTPATIENT)
Dept: ONCOLOGY | Facility: HOSPITAL | Age: 40
End: 2020-04-21

## 2020-04-21 ENCOUNTER — OFFICE VISIT (OUTPATIENT)
Dept: ONCOLOGY | Facility: CLINIC | Age: 40
End: 2020-04-21

## 2020-04-21 ENCOUNTER — LAB (OUTPATIENT)
Dept: ONCOLOGY | Facility: HOSPITAL | Age: 40
End: 2020-04-21

## 2020-04-21 ENCOUNTER — HOSPITAL ENCOUNTER (OUTPATIENT)
Dept: ULTRASOUND IMAGING | Facility: HOSPITAL | Age: 40
Discharge: HOME | End: 2020-04-21
Admitting: INTERNAL MEDICINE

## 2020-04-21 VITALS
DIASTOLIC BLOOD PRESSURE: 90 MMHG | WEIGHT: 245.8 LBS | TEMPERATURE: 96.8 F | HEART RATE: 68 BPM | SYSTOLIC BLOOD PRESSURE: 158 MMHG | HEIGHT: 65 IN | RESPIRATION RATE: 20 BRPM | BODY MASS INDEX: 40.95 KG/M2

## 2020-04-21 DIAGNOSIS — R91.8 MULTIPLE LUNG NODULES ON CT: ICD-10-CM

## 2020-04-21 DIAGNOSIS — D68.59 PROTEIN S DEFICIENCY (HCC): ICD-10-CM

## 2020-04-21 DIAGNOSIS — Z79.01 LONG TERM (CURRENT) USE OF ANTICOAGULANTS: ICD-10-CM

## 2020-04-21 DIAGNOSIS — Z86.718 HISTORY OF DVT OF LOWER EXTREMITY: Primary | ICD-10-CM

## 2020-04-21 DIAGNOSIS — Z86.718 HISTORY OF DVT OF LOWER EXTREMITY: ICD-10-CM

## 2020-04-21 LAB
ALBUMIN SERPL-MCNC: 4.1 G/DL (ref 3.5–5.2)
ALBUMIN/GLOB SERPL: 1.2 G/DL
ALP SERPL-CCNC: 88 U/L (ref 39–117)
ALT SERPL W P-5'-P-CCNC: 18 U/L (ref 1–33)
ANION GAP SERPL CALCULATED.3IONS-SCNC: 11 MMOL/L (ref 5–15)
AST SERPL-CCNC: 20 U/L (ref 1–32)
BASOPHILS # BLD AUTO: 0.06 10*3/MM3 (ref 0–0.2)
BASOPHILS NFR BLD AUTO: 1 % (ref 0–1.5)
BILIRUB SERPL-MCNC: 0.5 MG/DL (ref 0.2–1.2)
BUN BLD-MCNC: 13 MG/DL (ref 6–20)
BUN/CREAT SERPL: 21 (ref 7–25)
CALCIUM SPEC-SCNC: 9.2 MG/DL (ref 8.6–10.5)
CHLORIDE SERPL-SCNC: 101 MMOL/L (ref 98–107)
CO2 SERPL-SCNC: 24 MMOL/L (ref 22–29)
CREAT BLD-MCNC: 0.62 MG/DL (ref 0.57–1)
DEPRECATED RDW RBC AUTO: 38.5 FL (ref 37–54)
EOSINOPHIL # BLD AUTO: 0.36 10*3/MM3 (ref 0–0.4)
EOSINOPHIL NFR BLD AUTO: 6 % (ref 0.3–6.2)
ERYTHROCYTE [DISTWIDTH] IN BLOOD BY AUTOMATED COUNT: 13 % (ref 12.3–15.4)
GFR SERPL CREATININE-BSD FRML MDRD: 107 ML/MIN/1.73
GLOBULIN UR ELPH-MCNC: 3.5 GM/DL
GLUCOSE BLD-MCNC: 100 MG/DL (ref 65–99)
HCT VFR BLD AUTO: 43.6 % (ref 34–46.6)
HGB BLD-MCNC: 14.3 G/DL (ref 12–15.9)
HOLD SPECIMEN: NORMAL
IMM GRANULOCYTES # BLD AUTO: 0.02 10*3/MM3 (ref 0–0.05)
IMM GRANULOCYTES NFR BLD AUTO: 0.3 % (ref 0–0.5)
LYMPHOCYTES # BLD AUTO: 1.66 10*3/MM3 (ref 0.7–3.1)
LYMPHOCYTES NFR BLD AUTO: 27.5 % (ref 19.6–45.3)
MCH RBC QN AUTO: 27 PG (ref 26.6–33)
MCHC RBC AUTO-ENTMCNC: 32.8 G/DL (ref 31.5–35.7)
MCV RBC AUTO: 82.3 FL (ref 79–97)
MONOCYTES # BLD AUTO: 0.56 10*3/MM3 (ref 0.1–0.9)
MONOCYTES NFR BLD AUTO: 9.3 % (ref 5–12)
NEUTROPHILS # BLD AUTO: 3.37 10*3/MM3 (ref 1.7–7)
NEUTROPHILS NFR BLD AUTO: 55.9 % (ref 42.7–76)
NRBC BLD AUTO-RTO: 0 /100 WBC (ref 0–0.2)
PLATELET # BLD AUTO: 267 10*3/MM3 (ref 140–450)
PMV BLD AUTO: 9.1 FL (ref 6–12)
POTASSIUM BLD-SCNC: 4.2 MMOL/L (ref 3.5–5.2)
PROT SERPL-MCNC: 7.6 G/DL (ref 6–8.5)
RBC # BLD AUTO: 5.3 10*6/MM3 (ref 3.77–5.28)
SODIUM BLD-SCNC: 136 MMOL/L (ref 136–145)
WBC NRBC COR # BLD: 6.03 10*3/MM3 (ref 3.4–10.8)

## 2020-04-21 PROCEDURE — 93971 EXTREMITY STUDY: CPT

## 2020-04-21 PROCEDURE — G8730 PAIN DOC POS AND PLAN: HCPCS | Performed by: INTERNAL MEDICINE

## 2020-04-21 PROCEDURE — 85025 COMPLETE CBC W/AUTO DIFF WBC: CPT | Performed by: INTERNAL MEDICINE

## 2020-04-21 PROCEDURE — G0463 HOSPITAL OUTPT CLINIC VISIT: HCPCS | Performed by: INTERNAL MEDICINE

## 2020-04-21 PROCEDURE — 80053 COMPREHEN METABOLIC PANEL: CPT | Performed by: INTERNAL MEDICINE

## 2020-04-21 PROCEDURE — G9903 PT SCRN TBCO ID AS NON USER: HCPCS | Performed by: INTERNAL MEDICINE

## 2020-04-21 PROCEDURE — 1123F ACP DISCUSS/DSCN MKR DOCD: CPT | Performed by: INTERNAL MEDICINE

## 2020-04-21 PROCEDURE — 99214 OFFICE O/P EST MOD 30 MIN: CPT | Performed by: INTERNAL MEDICINE

## 2020-04-21 NOTE — TELEPHONE ENCOUNTER
Called and spoke with pt per Dr. Coppola's instructions, pt given doppler results and instructions regarding use of stockings.  V/u obtained.

## 2020-04-21 NOTE — PROGRESS NOTES
DATE OF VISIT: 4/21/2020      REASON FOR VISIT:  History of right lower extremity DVT and protein S deficiency ,lung nodules, swelling of right lower extremity      HISTORY OF PRESENT ILLNESS:    39-year-old female with a past medical history significant for right lower extremity DVT which was initially diagnosed in 1998 immediately after childbirth for which she took blood thinner for 6 month, patient had a recurrence of DVT in 2006 again after childbirth for which she took blood thinner for 6 month.  At some point she was tested positive for protein S deficiency.  Patient also has a strong family history of blood clots in mother, sister as well as grandparents.  Patient was initially seen in consultation on July 31, 2017 and had hypercoagulable workup done which showed type III protein S deficiency.  Patient was recommended to start anticoagulation but she was reluctant to start anticoagulation at this point and she was lost to follow-up after August 2017. Patient was seen again at [facility] in January 2019 and anticoagulation was started.  Initially she was taking Coumadin and was being followed by Coumadin clinic.  In September 2019 she got assistance with Xarelto and has been on Xarelto until January 2020.  Patient stopped taking Xarelto in January 2020 secondary to pain in right lower extremity that she thought was coming from Xarelto.  Patient presents to clinic today with complaint of swelling of right lower extremity.  Patient states she injured her right lower extremity in December 2019 and was seen in emergency room for the same problem and had a Doppler ultrasound done which was negative for DVT. Denies any chest pain or any new shortness of breath.  Denies any fevers or chills or night sweats.        PAST MEDICAL HISTORY:    Past Medical History:   Diagnosis Date   • Dvt femoral (deep venous thrombosis) (CMS/[facility])        SOCIAL HISTORY:    Social History     Tobacco Use   • Smoking status:  "Former Smoker     Last attempt to quit: 2015     Years since quittin.7   • Smokeless tobacco: Never Used   Substance Use Topics   • Alcohol use: No     Comment: special occasions   • Drug use: No       Surgical History :  Past Surgical History:   Procedure Laterality Date   •  SECTION     • CHOLECYSTECTOMY     • TUBAL ABDOMINAL LIGATION Bilateral     2016       ALLERGIES:    No Known Allergies    REVIEW OF SYSTEMS:      CONSTITUTIONAL:  Complains of fatigue.  No recent weight change.  Denies any fever, chills .      HEENT:  No epistaxis, mouth sores or difficulty swallowing.     RESPIRATORY:  No new shortness of breath. No new cough or hemoptysis.     CARDIOVASCULAR:  No chest pain or palpitations.     GASTROINTESTINAL:  No abdominal pain nausea, vomiting or blood in the stool.     GENITOURINARY: No Dysuria or Hematuria.     MUSCULOSKELETAL:  Positive for  swelling of right lower extremity.     LYMPHATICS:  Denies any abnormal swollen glands anywhere in the body.     NEUROLOGICAL : Complains of migraine headaches.  No tingling or numbness. No dizziness. No seizures or balance problems.     SKIN: No new skin lesions.          PHYSICAL EXAMINATION:      VITAL SIGNS:  /90   Pulse 68   Temp 96.8 °F (36 °C)   Resp 20   Ht 163.8 cm (64.5\")   Wt 111 kg (245 lb 12.8 oz)   BMI 41.54 kg/m²      ECOG performance status: 1    GENERAL:  Not in any distress.Obese female.    HEENT:  Normocephalic, Atraumatic.Mild Conjunctival pallor. No icterus. Extraocular Movements Intact. No Facial Asymmetry noted.    NECK:  No adenopathy. No JVD.Trachea in midline.    RESPIRATORY:  Fair air entry bilateral. No rhonchi or wheezing.    CARDIOVASCULAR:  S1, S2. Regular rate and rhythm. No murmur or gallop appreciated.    ABDOMEN:  Soft, obese, nontender. Bowel sounds present in all four quadrants.  No hepatosplenomegaly appreciated due to body habitus.    MUSCULOSKELTAL: 1+ edema right lower extremity.No Calf " Tenderness.Decreased range of motion.    NEUROLOGIC:  Alert, awake and oriented ×3.  No  Motor or sensory deficit appreciated. Cranial Nerves 2-12 grossly intact.    SKIN: Venous stasis changes present on right lower extremity.Skin is warm and moist to touch.    LYMPHATICS: No new lymph node enlargement in neck or supraclavicular area.        DIAGNOSTIC DATA:    Glucose   Date Value Ref Range Status   04/21/2020 100 (H) 65 - 99 mg/dL Final     Sodium   Date Value Ref Range Status   04/21/2020 136 136 - 145 mmol/L Final     Potassium   Date Value Ref Range Status   04/21/2020 4.2 3.5 - 5.2 mmol/L Final     CO2   Date Value Ref Range Status   04/21/2020 24.0 22.0 - 29.0 mmol/L Final     Chloride   Date Value Ref Range Status   04/21/2020 101 98 - 107 mmol/L Final     Anion Gap   Date Value Ref Range Status   04/21/2020 11.0 5.0 - 15.0 mmol/L Final     Creatinine   Date Value Ref Range Status   04/21/2020 0.62 0.57 - 1.00 mg/dL Final     BUN   Date Value Ref Range Status   04/21/2020 13 6 - 20 mg/dL Final     BUN/Creatinine Ratio   Date Value Ref Range Status   04/21/2020 21.0 7.0 - 25.0 Final     Calcium   Date Value Ref Range Status   04/21/2020 9.2 8.6 - 10.5 mg/dL Final     eGFR Non  Amer   Date Value Ref Range Status   04/21/2020 107 >60 mL/min/1.73 Final     Alkaline Phosphatase   Date Value Ref Range Status   04/21/2020 88 39 - 117 U/L Final     Total Protein   Date Value Ref Range Status   04/21/2020 7.6 6.0 - 8.5 g/dL Final     ALT (SGPT)   Date Value Ref Range Status   04/21/2020 18 1 - 33 U/L Final     AST (SGOT)   Date Value Ref Range Status   04/21/2020 20 1 - 32 U/L Final     Total Bilirubin   Date Value Ref Range Status   04/21/2020 0.5 0.2 - 1.2 mg/dL Final     Albumin   Date Value Ref Range Status   04/21/2020 4.10 3.50 - 5.20 g/dL Final     Globulin   Date Value Ref Range Status   04/21/2020 3.5 gm/dL Final     Lab Results   Component Value Date    WBC 6.03 04/21/2020    HGB 14.3 04/21/2020     HCT 43.6 04/21/2020    MCV 82.3 04/21/2020     04/21/2020     Lab Results   Component Value Date    NEUTROABS 3.37 04/21/2020    IRON 38 07/31/2017    TIBC 338 07/31/2017    LABIRON 11 (L) 07/31/2017    FERRITIN 46.70 07/31/2017    [id number] 294 07/31/2017    FOLATE 16.50 07/31/2017     Protein C Activity      Ref Range & Units 3wk ago     Prt C Activity (Chromogenic) % 97   Comments: Reference Range:   17 years and older: 73 - 180   Resulting Agency  [facility]   Narrative   Performed at:  [facility]  [address]  [id number]  : Ml Arteaga MD, Phone:  [phone]      Specimen Collected: 07/31/17  4:26 PM Last Resulted: 08/04/17                   Protein C & S, Functional      Ref Range & Units 3wk ago     Protein C-Functional 73 - 180 % 86   Protein S-Functional 63 - 140 % 58 (L)             Protein C & S Antigens   Order: [id number]   Status:  Final result   Visible to patient:  No (Not Released) Dx:  Protein S deficiency      Ref Range & Units 3wk ago     Protein C Antigen 60 - 150 % 79   Protein S Ag, Total 60 - 150 % 85   Comments: This test was developed and its performance characteristics   determined by [facility]. It has not been cleared or approved   by the Food and Drug Administration.   Protein S Ag, Free 57 - 157 % 31 (L)           Rest of the hypercoagulable testing with lupus anticoagulant, anticardiolipin antibodies, factor V Leyden mutation, antithrombin III level and prothrombin gene mutation were negative on July 31, 2017.        RADIOLOGY DATA:  Doppler ultrasound of right lower extremity done on April 21, 2020 showed:    FINDINGS:  Normal phasic flow was noted in the visualized deep  venous system on the right. No intraluminal increased  echogenicity is noted to suggest thrombus. There is normal  compression and augmentation of the venous structures. No  abnormal venous collaterals are seen. No venous reflux is  demonstrated  .     IMPRESSION:  1. No evidence of right lower extremity DVT .          CT of chest without contrast done on April 24, 2019 showed:    IMPRESSION:  CONCLUSION:  There are bilateral pulmonary nodules within rims of peripheral  calcification not significantly changed compared to the prior  exam. The largest in the left lung measures 1.2 cm in diameter.  The largest in the right lung is oval in shape measuring 1.2 x  0.9 cm. No new nodules are seen. These are most likely benign  given the pattern of calcification and lack of change.         CT chest with contrast done on January 21, 2019 showed:  IMPRESSION:  CONCLUSION:  Bilateral nodules with faint rims of peripheral calcification,  more likely due to an infectious or inflammatory process than  neoplasm. Recommend follow-up chest CT in three months to assess  for stability. These could also be further assessed with PET/CT  or percutaneous biopsy of the largest lesion.        ASSESSMENT AND PLAN:      1.  Protein S deficiency:  - Based on the hypercoagulable workup done on July 31, 2017 showing has a type III protein S deficiency with normal total protein S antigen, and decreasing protein S antigen free as well as protein S functional.    -Since patient has a history of DVT ×2 first time in 1998 and second time in 2006 both time after childbirth she took Coumadin for 6 month on each occasion.  Since patient has a very strong family history of DVT and pulmonary embolism, her protein S deficiency is worrisome for recurrence of DVT.  She does not have blood clot since 2006.    -In August 2017, for anticoagulation was recommended to patient.  However she was not willing to do anticoagulation at that point.  She was also offered to go for second opinion, she did not do that secondary to financial constraints.  -Now patient is interested in taking anticoagulation again.  -Due to 2 episode of DVT and type III protein S deficiency, anticoagulation option consisting of  Lovenox with Coumadin or newer anticoagulation like Apixaban or xarelto were discussed with patient.  -Patient was started back on anticoagulation Coumadin on January 29, 2019.  -In September 2019, patient with assistance for Xarelto and she stopped taking Coumadin and switched over to Xarelto.  - Patient stopped taking Xarelto in January 2020 secondary to pain in right lower extremity that she thought was coming from Xarelto.  - Doppler ultrasound of right lower extremity done today on April 21, 2020 does not show any evidence of DVT.  Her swelling is secondary to post thrombotic syndrome.  - Patient was recommended to restart Xarelto from today.  Patient was also instructed to Use compression stocking for post- thrombotic syndrome.  -We will ask patient to return to clinic in 3 months with a repeat CBC and CMP to be done on that day.  -Patient was instructed to come to the emergency room if she starts having any bleeding complication.      2.  History of 3 miscarriages before ninth week of pregnancy.  Lupus anticoagulant as well as anticardiolipin antibodies has been negative.    3.  Multiple lung nodules:  -Patient was found to have multiple lung nodules on a CT scan done in January 2019.  Nodules does have a calcification, most likely benign etiology.  -CT chest without contrast done on April 24, 2019 showed stability of lung nodule with calcification.  Lung nodules are benign in nature.    4. Health maintenance: Patient does not smoke.  She is only 39 years of age and not due for colonoscopy at this point.      5. BMI: Patient's Body mass index is 41.54 kg/m². BMI is higher than reference range.  Patient is aware of elevated BMI    6. Advance Care Planning: For now patient remains full code and is able to make  Her decisions.  Patient has health care surrogate mentioned on chart.    7. Leona Cardoso reports a pain score of 6.  Given her pain assessment as noted, treatment options were discussed and the  following options were decided upon as a follow-up plan to address the patient's pain: continuation of current treatment plan for pain.    8. PHQ-9 Total Score:           Al Coppola MD  4/21/2020  12:19

## 2021-01-15 ENCOUNTER — HOSPITAL ENCOUNTER (EMERGENCY)
Facility: HOSPITAL | Age: 41
Discharge: HOME | End: 2021-01-15
Attending: FAMILY MEDICINE | Admitting: FAMILY MEDICINE

## 2021-01-15 VITALS
HEART RATE: 90 BPM | SYSTOLIC BLOOD PRESSURE: 165 MMHG | DIASTOLIC BLOOD PRESSURE: 105 MMHG | RESPIRATION RATE: 20 BRPM | TEMPERATURE: 98.1 F | OXYGEN SATURATION: 100 %

## 2021-01-15 DIAGNOSIS — S61.012A LACERATION OF LEFT THUMB WITHOUT FOREIGN BODY WITHOUT DAMAGE TO NAIL, INITIAL ENCOUNTER: Primary | ICD-10-CM

## 2021-01-15 PROCEDURE — 99282 EMERGENCY DEPT VISIT SF MDM: CPT

## 2021-01-15 PROCEDURE — 25010000002 TDAP 5-2.5-18.5 LF-MCG/0.5 SUSPENSION: Performed by: FAMILY MEDICINE

## 2021-01-15 PROCEDURE — 90715 TDAP VACCINE 7 YRS/> IM: CPT | Performed by: FAMILY MEDICINE

## 2021-01-15 PROCEDURE — 90471 IMMUNIZATION ADMIN: CPT | Performed by: FAMILY MEDICINE

## 2021-01-15 RX ORDER — LIDOCAINE HYDROCHLORIDE 10 MG/ML
10 INJECTION, SOLUTION EPIDURAL; INFILTRATION; INTRACAUDAL; PERINEURAL ONCE
Status: COMPLETED | OUTPATIENT
Start: 2021-01-15 | End: 2021-01-15

## 2021-01-15 RX ORDER — DIAPER,BRIEF,INFANT-TODD,DISP
EACH MISCELLANEOUS EVERY 12 HOURS SCHEDULED
Status: DISCONTINUED | OUTPATIENT
Start: 2021-01-15 | End: 2021-01-15 | Stop reason: HOSPADM

## 2021-01-15 RX ADMIN — BACITRACIN: 500 OINTMENT TOPICAL at 15:21

## 2021-01-15 RX ADMIN — LIDOCAINE HYDROCHLORIDE 10 ML: 10 INJECTION, SOLUTION EPIDURAL; INFILTRATION; INTRACAUDAL; PERINEURAL at 14:19

## 2021-01-15 RX ADMIN — TETANUS TOXOID, REDUCED DIPHTHERIA TOXOID AND ACELLULAR PERTUSSIS VACCINE, ADSORBED 0.5 ML: 5; 2.5; 8; 8; 2.5 SUSPENSION INTRAMUSCULAR at 14:18

## 2021-01-15 NOTE — ED PROVIDER NOTES
Subjective     History provided by:  Patient   used: No    Finger Laceration  Location:  Left thumb  Duration:  2 hours  Progression:  Unchanged  Chronicity:  New  Context:  Cut left thumb on glass pane window  Associated symptoms: no abdominal pain, no chest pain, no congestion, no cough, no fatigue, no loss of consciousness, no myalgias, no nausea, no rash, no shortness of breath, no sore throat and no wheezing        Review of Systems   Constitutional: Negative for chills and fatigue.   HENT: Negative for congestion and sore throat.    Eyes: Negative for pain and redness.   Respiratory: Negative for cough, shortness of breath and wheezing.    Cardiovascular: Negative for chest pain.   Gastrointestinal: Negative for abdominal pain and nausea.   Endocrine: Negative for polydipsia and polyphagia.   Genitourinary: Negative for difficulty urinating and dysuria.   Musculoskeletal: Negative for back pain and myalgias.   Skin: Negative for rash.   Allergic/Immunologic: Negative for food allergies and immunocompromised state.   Neurological: Negative for dizziness, loss of consciousness and light-headedness.   Psychiatric/Behavioral: Negative for agitation.       Past Medical History:   Diagnosis Date   • Dvt femoral (deep venous thrombosis) (CMS/[facility])        No Known Allergies    Past Surgical History:   Procedure Laterality Date   •  SECTION     • CHOLECYSTECTOMY     • TUBAL ABDOMINAL LIGATION Bilateral            Family History   Problem Relation Age of Onset   • Hypertension Mother    • Cancer Paternal Grandmother    • Heart disease Paternal Grandfather        Social History     Socioeconomic History   • Marital status:      Spouse name: Not on file   • Number of children: Not on file   • Years of education: Not on file   • Highest education level: Not on file   Tobacco Use   • Smoking status: Former Smoker     Quit date: 2015     Years since quittin.5   • Smokeless  tobacco: Never Used   Substance and Sexual Activity   • Alcohol use: No     Comment: special occasions   • Drug use: No   • Sexual activity: Yes     Partners: Male     Birth control/protection: Surgical           Objective   Physical Exam  Vitals signs and nursing note reviewed.   Constitutional:       General: She is not in acute distress.     Appearance: She is well-developed.   HENT:      Head: Normocephalic and atraumatic.   Eyes:      Conjunctiva/sclera: Conjunctivae normal.   Neck:      Vascular: No JVD.   Cardiovascular:      Rate and Rhythm: Normal rate and regular rhythm.      Heart sounds: Normal heart sounds. No murmur. No friction rub. No gallop.    Pulmonary:      Effort: Pulmonary effort is normal.      Breath sounds: Normal breath sounds. No wheezing or rales.   Abdominal:      General: Bowel sounds are normal. There is no distension.      Palpations: Abdomen is soft.      Tenderness: There is no abdominal tenderness.   Skin:     Capillary Refill: Capillary refill takes less than 2 seconds.      Comments: Laceration on left thumb on the lateral aspect lateral to the nail   Neurological:      Mental Status: She is alert and oriented to person, place, and time.         Laceration Repair    Date/Time: 1/15/2021 3:34 PM  Performed by: Bartolo Pérez MD  Authorized by: Joseph Yeager MD     Consent:     Consent obtained:  Verbal    Consent given by:  Patient    Alternatives discussed:  No treatment  Anesthesia (see MAR for exact dosages):     Anesthesia method:  Nerve block    Block anesthetic:  Lidocaine 1% w/o epi    Block injection procedure:  Anatomic landmarks identified and negative aspiration for blood    Block outcome:  Anesthesia achieved  Laceration details:     Location:  Finger    Finger location:  L thumb    Length (cm):  2  Repair type:     Repair type:  Simple  Pre-procedure details:     Preparation:  Patient was prepped and draped in usual sterile fashion  Exploration:      Hemostasis achieved with:  Direct pressure    Wound extent: no muscle damage noted, no tendon damage noted and no underlying fracture noted    Treatment:     Area cleansed with:  Hibiclens    Amount of cleaning:  Standard    Irrigation solution:  Sterile saline    Irrigation method:  Syringe  Mucous membrane repair:     Suture size:  4-0    Wound mucous membrane closure material used: Nylon.    Suture technique:  Simple interrupted  Skin repair:     Repair method:  Sutures, Steri-Strips and tissue adhesive    Number of sutures:  6  Approximation:     Approximation:  Close  Post-procedure details:     Patient tolerance of procedure:  Tolerated well, no immediate complications               ED Course  ED Course as of Tristian 15 1538   Fri Tristian 15, 2021   1538 Laceration repaired w/o complication    [AW]      ED Course User Index  [AW] Bartolo Pérez MD                                           MDM  Number of Diagnoses or Management Options  Laceration of left thumb without foreign body without damage to nail, initial encounter: new and does not require workup      Final diagnoses:   Laceration of left thumb without foreign body without damage to nail, initial encounter       This document has been electronically signed by Bartolo Pérez MD on January 15, 2021 15:38 CST         Bartolo Pérez MD  Resident  01/15/21 1538

## 2021-04-09 ENCOUNTER — OFFICE VISIT (OUTPATIENT)
Dept: OBSTETRICS AND GYNECOLOGY | Facility: CLINIC | Age: 41
End: 2021-04-09

## 2021-04-09 VITALS
SYSTOLIC BLOOD PRESSURE: 126 MMHG | WEIGHT: 245 LBS | BODY MASS INDEX: 40.82 KG/M2 | DIASTOLIC BLOOD PRESSURE: 70 MMHG | HEIGHT: 65 IN

## 2021-04-09 DIAGNOSIS — Z01.419 WELL WOMAN EXAM WITH ROUTINE GYNECOLOGICAL EXAM: Primary | ICD-10-CM

## 2021-04-09 DIAGNOSIS — Z12.31 SCREENING MAMMOGRAM, ENCOUNTER FOR: ICD-10-CM

## 2021-04-09 PROCEDURE — 99396 PREV VISIT EST AGE 40-64: CPT | Performed by: NURSE PRACTITIONER

## 2021-04-09 NOTE — PROGRESS NOTES
Subjective   Leona Cardoso is a 40 y.o.  Here for gyn annual    LMP: 3/16/2021  Pap: 3/11/2019, NIL negative HPV  Mammo: Never  BC: tubal ligation    Leona Cardoso is a 40 yr old whop resents today for her gyn annual. Feels she is perimenopausal, is having hot flashes. Periods occur every 22-35 days and last for 3 days at time but then sometimes she does not have a period for 2-3 months and this occurs about 2 times a year. Her mother and grandmother went through menopause in their 40s. Is sexually active, same partner for 5 years. Declines STI screening. Deferring pelvic exam today.       Gynecologic Exam  The patient's pertinent negatives include no genital itching, genital lesions, genital odor, genital rash, missed menses, pelvic pain, vaginal bleeding or vaginal discharge. Pertinent negatives include no abdominal pain, chills, constipation, diarrhea, dysuria, fever, frequency, nausea, rash or sore throat. Nothing aggravates the symptoms. She is sexually active. No, her partner does not have an STD. She uses tubal ligation for contraception. Her menstrual history has been irregular.       The following portions of the patient's history were reviewed and updated as appropriate: allergies, current medications, past family history, past medical history, past social history, past surgical history and problem list.    Review of Systems   Constitutional: Negative for chills, fatigue, fever, unexpected weight gain and unexpected weight loss.   HENT: Negative for sneezing and sore throat.    Respiratory: Negative for shortness of breath.    Cardiovascular: Negative for chest pain and palpitations.   Gastrointestinal: Negative for abdominal pain, constipation, diarrhea and nausea.   Endocrine: Positive for heat intolerance. Negative for cold intolerance.   Genitourinary: Positive for menstrual problem. Negative for amenorrhea, breast discharge, breast lump, breast pain, difficulty urinating, dysuria, frequency,  missed menses, pelvic pain, pelvic pressure, urinary incontinence, vaginal bleeding, vaginal discharge and vaginal pain.   Skin: Negative for rash.   Neurological: Negative for weakness and headache.   Psychiatric/Behavioral: Negative for sleep disturbance, depressed mood and stress.       Objective   Physical Exam  Vitals and nursing note reviewed.   Constitutional:       Appearance: She is well-developed. She is obese.   HENT:      Head: Normocephalic and atraumatic.   Eyes:      Conjunctiva/sclera: Conjunctivae normal.   Neck:      Thyroid: No thyromegaly.   Cardiovascular:      Rate and Rhythm: Normal rate and regular rhythm.      Heart sounds: Normal heart sounds.   Pulmonary:      Effort: Pulmonary effort is normal. No respiratory distress.      Breath sounds: Normal breath sounds.   Chest:      Breasts:         Right: No swelling, bleeding, inverted nipple, mass, nipple discharge, skin change or tenderness.         Left: No swelling, bleeding, inverted nipple, mass, nipple discharge, skin change or tenderness.   Abdominal:      General: Bowel sounds are normal. There is no distension.      Palpations: Abdomen is soft.      Tenderness: There is no abdominal tenderness.   Genitourinary:     Comments: Pelvic exam deferred. No complaints. Pap up to date.   Musculoskeletal:         General: Normal range of motion.      Cervical back: Normal range of motion and neck supple.   Skin:     General: Skin is warm and dry.   Neurological:      Mental Status: She is alert and oriented to person, place, and time.   Psychiatric:         Behavior: Behavior normal.           Assessment/Plan   Diagnoses and all orders for this visit:    1. Well woman exam with routine gynecological exam (Primary)    2. Screening mammogram, encounter for  -     Mammo Screening Digital Tomosynthesis Bilateral With CAD; Future      Reviewed perimenopause and menopause; average age of menopause is 50 years of age. Return for any PMB or return if  she experiences heavier or longer periods or no periods for more than 3 months. Reviewed breast awareness and annual mammogram.  Reviewed cytology screening guidelines, pap is due 3/2024. Return in one year.

## 2021-08-20 PROCEDURE — 87635 SARS-COV-2 COVID-19 AMP PRB: CPT | Performed by: EMERGENCY MEDICINE

## 2022-01-04 ENCOUNTER — APPOINTMENT (OUTPATIENT)
Dept: ULTRASOUND IMAGING | Facility: HOSPITAL | Age: 42
End: 2022-01-04

## 2022-01-04 ENCOUNTER — HOSPITAL ENCOUNTER (EMERGENCY)
Facility: HOSPITAL | Age: 42
Discharge: HOME | End: 2022-01-05
Attending: EMERGENCY MEDICINE | Admitting: EMERGENCY MEDICINE

## 2022-01-04 DIAGNOSIS — I82.431 ACUTE DEEP VEIN THROMBOSIS (DVT) OF POPLITEAL VEIN OF RIGHT LOWER EXTREMITY: Primary | ICD-10-CM

## 2022-01-04 PROCEDURE — 99283 EMERGENCY DEPT VISIT LOW MDM: CPT

## 2022-01-04 PROCEDURE — 93971 EXTREMITY STUDY: CPT

## 2022-01-05 ENCOUNTER — TELEPHONE (OUTPATIENT)
Dept: SOCIAL WORK | Facility: HOSPITAL | Age: 42
End: 2022-01-05

## 2022-01-05 VITALS
OXYGEN SATURATION: 100 % | RESPIRATION RATE: 16 BRPM | HEART RATE: 76 BPM | SYSTOLIC BLOOD PRESSURE: 153 MMHG | WEIGHT: 245 LBS | BODY MASS INDEX: 41.83 KG/M2 | HEIGHT: 64 IN | DIASTOLIC BLOOD PRESSURE: 96 MMHG | TEMPERATURE: 98.1 F

## 2022-01-05 RX ORDER — RIVAROXABAN 15 MG-20MG
KIT ORAL
Qty: 52 TABLET | Refills: 0 | Status: SHIPPED | OUTPATIENT
Start: 2022-01-05 | End: 2022-01-20

## 2022-01-05 RX ADMIN — RIVAROXABAN 15 MG: 15 TABLET, FILM COATED ORAL at 01:40

## 2022-01-05 NOTE — TELEPHONE ENCOUNTER
I called and offered a 30 day trial card.  Patient stated she already has one.  I offered to contact the anticoagulation clinic for support with more medications.  She stated Dr SACHI Esparza sees patient for free as her  works at her clinic and she is seeing her Friday.  She has also contacted [employer] and has applied for patient assistance this AM.

## 2022-01-05 NOTE — ED PROVIDER NOTES
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  Presents for concern for recurrent DVT. Recent long road trip.    Family history, surgical history, social history, current medications and allergies are reviewed with the patient and triage documentation and vitals are reviewed.      History provided by:  Patient   used: No        Review of Systems   Constitutional: Negative for chills and fever.   HENT: Negative.    Eyes: Negative.    Respiratory: Negative for cough and shortness of breath.    Cardiovascular: Negative for chest pain, palpitations and leg swelling.   Gastrointestinal: Negative for abdominal pain, nausea and vomiting.   Endocrine: Negative.    Genitourinary: Negative.    Musculoskeletal: Positive for myalgias. Negative for arthralgias and joint swelling.   Skin: Negative for wound.   Allergic/Immunologic: Negative.    Neurological: Negative for weakness and numbness.   Hematological: Negative.    Psychiatric/Behavioral: Negative.        Past Medical History:   Diagnosis Date   • Dvt femoral (deep venous thrombosis) (HCC)        No Known Allergies    Past Surgical History:   Procedure Laterality Date   •  SECTION     • CHOLECYSTECTOMY     • TUBAL ABDOMINAL LIGATION Bilateral            Family History   Problem Relation Age of Onset   • Hypertension Mother    • Cancer Paternal Grandmother    • Heart disease Paternal Grandfather        Social History     Socioeconomic History   • Marital status:    Tobacco Use   • Smoking status: Former Smoker     Quit date: 2015     Years since quittin.4   • Smokeless tobacco: Never Used   Substance and Sexual Activity   • Alcohol use: No     Comment: special occasions   • Drug use: No   • Sexual activity: Yes      Partners: Male     Birth control/protection: Surgical           Objective   Physical Exam  Vitals and nursing note reviewed.   Constitutional:       General: She is not in acute distress.     Appearance: Normal appearance. She is obese. She is not ill-appearing, toxic-appearing or diaphoretic.   Cardiovascular:      Rate and Rhythm: Normal rate and regular rhythm.      Heart sounds: No murmur heard.      Pulmonary:      Effort: Pulmonary effort is normal. No respiratory distress.      Breath sounds: Normal breath sounds. No wheezing.   Musculoskeletal:         General: No tenderness.      Right lower leg: No swelling, deformity, lacerations, tenderness or bony tenderness. No edema.      Left lower leg: No edema.      Right foot: Normal pulse.   Skin:     General: Skin is warm and dry.      Capillary Refill: Capillary refill takes less than 2 seconds.   Neurological:      General: No focal deficit present.      Mental Status: She is alert and oriented to person, place, and time.         Procedures  none         ED Course    Labs Reviewed - No data to display  US Venous Doppler Lower Extremity Right (duplex)    Result Date: 1/4/2022  Narrative: EXAM DESCRIPTION: Site:  US VENOUS DOPPLER LOWER EXTREMITY RIGHT (DUPLEX) RP:  US LOWER EXTREMITY VEINS LIMITED UNILATERAL FOLLOW UP CLINICAL HISTORY: 41 years  Female;  pain, hx dvt TECHNIQUE: Multiple grayscale sonographic images of the leg were obtained utilizing a high-frequency linear array transducer supplemented with color Doppler, compression and augmentation  techniques. COMPARISON: None. FINDINGS: Right leg veins:  Common femoral: normal  Greater saphenous: normal    upper Superficial femoral: normal      mid Superficial femoral: normal  lower Superficial femoral: Thrombus      Popliteal: Thrombus Posterior tibial: Suboptimally evaluated due to body habitus     Impression: 1.  Deep venous thrombosis in the popliteal and femoral veins Electronically signed by:   Neymar Muse MD  1/4/2022 11:02 PM [facility] Workstation: [id number]            [facility]  Number of Diagnoses or Management Options     Amount and/or Complexity of Data Reviewed  Tests in the radiology section of CPT®: reviewed    Patient Progress  Patient progress: stable    Patient with acute popliteal DVT. Femoral a superficial. Vital signs unremarkable and no chest symptoms. Felt appropriate for outpatient management. Wrote a prescription to the pharmacy and sent her with a trial off for coupon. She does not have any insurance and will not follow-up with hematology. Advised on expedited follow-up with primary care. Spoke with case management regarding contacting the patient tomorrow to ensure that she is able to get the prescription. She is started on a dose here in the emergency department. Patient acknowledges understanding and is agreeable to discharge.    Final diagnoses:   Acute deep vein thrombosis (DVT) of popliteal vein of right lower extremity (HCC)       ED Disposition  ED Disposition     ED Disposition Condition Comment    Discharge Stable           Tami Esparza MD  [address] [id number] [phone]    Schedule an appointment as soon as possible for a visit            Medication List      New Prescriptions    * Xarelto Starter Pack tablet therapy pack starter pack  Generic drug: Rivaroxaban  Take one 15 mg tablet twice daily with food for 21 days.  Followed by one 20 mg tablet by mouth once daily with food. Take as directed     * rivaroxaban 15 MG tablet  Commonly known as: XARELTO  Take 1 tablet by mouth 2 (Two) Times a Day With Meals for 2 days.         * This list has 2 medication(s) that are the same as other medications prescribed for you. Read the directions carefully, and ask your doctor or other care provider to review them with you.               Where to Get Your Medications      These medications were sent to [facility] [address] [place], [place] - [address]  - [phone]  - [phone] FX  [address] [facility] [address] [facility] [address], [place] [address]    Phone: [phone]   · Xarelto Starter Pack tablet therapy pack starter pack     You can get these medications from any pharmacy    Bring a paper prescription for each of these medications  · rivaroxaban 15 MG tablet          Tigre Murry DO  01/05/22 0020

## 2022-01-05 NOTE — DISCHARGE INSTRUCTIONS
Please return with new or worsening symptoms. Prescription has been sent to your pharmacy please use the prescription cards to help with the cost. I have also contacted case management to contact you to help with cost. A 2-day supply has been sent home with you. 15 mg twice daily with food. Follow closely with primary care. Return with shortness of breath, chest pain or worsening symptoms.

## 2022-01-10 ENCOUNTER — DOCUMENTATION (OUTPATIENT)
Dept: CARDIAC SURGERY | Facility: CLINIC | Age: 42
End: 2022-01-10

## 2022-01-10 NOTE — PROGRESS NOTES
Pt called to say she has been placed back on coumadin 5mg daily.  Pt took last Xarelto last Thursday.  Pt took coumadin 5mg Friday, Saturday and Sunday.  Pt will take coumadin 7.5mg today and Tuesday.  CC appt made for Wednesday the 12th.  Pt verbalized.  Findings reported by Evangelina Harrison RN.

## 2022-01-12 ENCOUNTER — ANTICOAGULATION VISIT (OUTPATIENT)
Dept: CARDIAC SURGERY | Facility: CLINIC | Age: 42
End: 2022-01-12

## 2022-01-12 VITALS — HEART RATE: 91 BPM | OXYGEN SATURATION: 99 %

## 2022-01-12 DIAGNOSIS — Z79.01 LONG TERM (CURRENT) USE OF ANTICOAGULANTS: ICD-10-CM

## 2022-01-12 DIAGNOSIS — D68.59 PROTEIN S DEFICIENCY: Primary | ICD-10-CM

## 2022-01-12 DIAGNOSIS — Z86.718 HISTORY OF DVT OF LOWER EXTREMITY: ICD-10-CM

## 2022-01-12 DIAGNOSIS — Z51.81 ENCOUNTER FOR THERAPEUTIC DRUG MONITORING: ICD-10-CM

## 2022-01-12 LAB — INR PPP: 1.4 (ref 0.9–1.1)

## 2022-01-12 PROCEDURE — 36416 COLLJ CAPILLARY BLOOD SPEC: CPT | Performed by: NURSE PRACTITIONER

## 2022-01-12 PROCEDURE — 85610 PROTHROMBIN TIME: CPT | Performed by: NURSE PRACTITIONER

## 2022-01-12 PROCEDURE — 93793 ANTICOAG MGMT PT WARFARIN: CPT | Performed by: NURSE PRACTITIONER

## 2022-01-12 NOTE — PROGRESS NOTES
Pt readmitted to [facility] for a recent clot.  Pt had been switched to Xarelto in 2019.  Pt states she quit taking Xarelto a year and a half ago.  I instructed pt on coumadin dosing schedule and will recheck INR on Monday.  I attempted to obtain history on patient as to why she had not been on a blood thinner in the last year and a half at which point pt began to argue as to why or why not she recently developed a blood clot.  Pt began to raise voice in anger and stood up arguing very loudly she did not develop a blood clot due to being off her blood thinner.  Me and other office RN was trying to explain to patient her medical dx and why she is on a blood thinner at which point she angrily left the office.  I did call my practice manager, Urszula Mark to make aware of what had just transpired.  Tracie Walker, RN was a witness to exchange.  I then called pt's primary physician's office, Tami Esparaz and spoke to Evie to make aware pt's behavior and CC will not be able to continue pt's care should this transpire once more.   Pt does have coumadin dosing instructions through Sunday, with another CC appt on Monday.    Findings reported by Evangelina Harrison RN.   Today's INR is Lab Results - Last 18 Months   Lab Units 01/12/22  0000   INR  1.40*

## 2022-01-14 ENCOUNTER — HOSPITAL ENCOUNTER (EMERGENCY)
Facility: HOSPITAL | Age: 42
Discharge: HOME | End: 2022-01-14
Attending: EMERGENCY MEDICINE | Admitting: EMERGENCY MEDICINE

## 2022-01-14 ENCOUNTER — PATIENT ROUNDING (BHMG ONLY) (OUTPATIENT)
Dept: CARDIAC SURGERY | Facility: CLINIC | Age: 42
End: 2022-01-14

## 2022-01-14 ENCOUNTER — APPOINTMENT (OUTPATIENT)
Dept: ULTRASOUND IMAGING | Facility: HOSPITAL | Age: 42
End: 2022-01-14

## 2022-01-14 VITALS
SYSTOLIC BLOOD PRESSURE: 133 MMHG | RESPIRATION RATE: 18 BRPM | WEIGHT: 245 LBS | HEIGHT: 64 IN | OXYGEN SATURATION: 98 % | HEART RATE: 86 BPM | BODY MASS INDEX: 41.83 KG/M2 | DIASTOLIC BLOOD PRESSURE: 80 MMHG | TEMPERATURE: 98.5 F

## 2022-01-14 DIAGNOSIS — I82.511 CHRONIC DEEP VEIN THROMBOSIS (DVT) OF FEMORAL VEIN OF RIGHT LOWER EXTREMITY: Primary | ICD-10-CM

## 2022-01-14 LAB
ANION GAP SERPL CALCULATED.3IONS-SCNC: 10 MMOL/L (ref 5–15)
BASOPHILS # BLD AUTO: 0.05 10*3/MM3 (ref 0–0.2)
BASOPHILS NFR BLD AUTO: 0.7 % (ref 0–1.5)
BUN SERPL-MCNC: 13 MG/DL (ref 6–20)
BUN/CREAT SERPL: 17.6 (ref 7–25)
CALCIUM SPEC-SCNC: 8.7 MG/DL (ref 8.6–10.5)
CHLORIDE SERPL-SCNC: 104 MMOL/L (ref 98–107)
CO2 SERPL-SCNC: 24 MMOL/L (ref 22–29)
CREAT SERPL-MCNC: 0.74 MG/DL (ref 0.57–1)
DEPRECATED RDW RBC AUTO: 38.1 FL (ref 37–54)
EOSINOPHIL # BLD AUTO: 0.2 10*3/MM3 (ref 0–0.4)
EOSINOPHIL NFR BLD AUTO: 2.9 % (ref 0.3–6.2)
ERYTHROCYTE [DISTWIDTH] IN BLOOD BY AUTOMATED COUNT: 12.7 % (ref 12.3–15.4)
GFR SERPL CREATININE-BSD FRML MDRD: 86 ML/MIN/1.73
GLUCOSE SERPL-MCNC: 88 MG/DL (ref 65–99)
HCT VFR BLD AUTO: 45.3 % (ref 34–46.6)
HGB BLD-MCNC: 15.1 G/DL (ref 12–15.9)
HOLD SPECIMEN: NORMAL
HOLD SPECIMEN: NORMAL
IMM GRANULOCYTES # BLD AUTO: 0.02 10*3/MM3 (ref 0–0.05)
IMM GRANULOCYTES NFR BLD AUTO: 0.3 % (ref 0–0.5)
INR PPP: 1.41 (ref 0.8–1.2)
LYMPHOCYTES # BLD AUTO: 1.68 10*3/MM3 (ref 0.7–3.1)
LYMPHOCYTES NFR BLD AUTO: 24.3 % (ref 19.6–45.3)
MCH RBC QN AUTO: 27.8 PG (ref 26.6–33)
MCHC RBC AUTO-ENTMCNC: 33.3 G/DL (ref 31.5–35.7)
MCV RBC AUTO: 83.3 FL (ref 79–97)
MONOCYTES # BLD AUTO: 0.77 10*3/MM3 (ref 0.1–0.9)
MONOCYTES NFR BLD AUTO: 11.1 % (ref 5–12)
NEUTROPHILS NFR BLD AUTO: 4.19 10*3/MM3 (ref 1.7–7)
NEUTROPHILS NFR BLD AUTO: 60.7 % (ref 42.7–76)
NRBC BLD AUTO-RTO: 0 /100 WBC (ref 0–0.2)
PLATELET # BLD AUTO: 277 10*3/MM3 (ref 140–450)
PMV BLD AUTO: 9.2 FL (ref 6–12)
POTASSIUM SERPL-SCNC: 3.9 MMOL/L (ref 3.5–5.2)
PROTHROMBIN TIME: 17 SECONDS (ref 11.1–15.3)
RBC # BLD AUTO: 5.44 10*6/MM3 (ref 3.77–5.28)
SODIUM SERPL-SCNC: 138 MMOL/L (ref 136–145)
WBC NRBC COR # BLD: 6.91 10*3/MM3 (ref 3.4–10.8)
WHOLE BLOOD HOLD SPECIMEN: NORMAL
WHOLE BLOOD HOLD SPECIMEN: NORMAL

## 2022-01-14 PROCEDURE — 99284 EMERGENCY DEPT VISIT MOD MDM: CPT

## 2022-01-14 PROCEDURE — 25010000002 ENOXAPARIN PER 10 MG: Performed by: EMERGENCY MEDICINE

## 2022-01-14 PROCEDURE — 96375 TX/PRO/DX INJ NEW DRUG ADDON: CPT

## 2022-01-14 PROCEDURE — 93971 EXTREMITY STUDY: CPT

## 2022-01-14 PROCEDURE — 85610 PROTHROMBIN TIME: CPT | Performed by: EMERGENCY MEDICINE

## 2022-01-14 PROCEDURE — 80048 BASIC METABOLIC PNL TOTAL CA: CPT | Performed by: EMERGENCY MEDICINE

## 2022-01-14 PROCEDURE — 96372 THER/PROPH/DIAG INJ SC/IM: CPT

## 2022-01-14 PROCEDURE — 85025 COMPLETE CBC W/AUTO DIFF WBC: CPT | Performed by: EMERGENCY MEDICINE

## 2022-01-14 PROCEDURE — 96374 THER/PROPH/DIAG INJ IV PUSH: CPT

## 2022-01-14 PROCEDURE — 99283 EMERGENCY DEPT VISIT LOW MDM: CPT

## 2022-01-14 PROCEDURE — 25010000002 ONDANSETRON PER 1 MG: Performed by: EMERGENCY MEDICINE

## 2022-01-14 PROCEDURE — 25010000002 FENTANYL CITRATE (PF) 50 MCG/ML SOLUTION: Performed by: EMERGENCY MEDICINE

## 2022-01-14 RX ORDER — ONDANSETRON 2 MG/ML
4 INJECTION INTRAMUSCULAR; INTRAVENOUS ONCE
Status: COMPLETED | OUTPATIENT
Start: 2022-01-14 | End: 2022-01-14

## 2022-01-14 RX ORDER — SODIUM CHLORIDE 0.9 % (FLUSH) 0.9 %
10 SYRINGE (ML) INJECTION AS NEEDED
Status: DISCONTINUED | OUTPATIENT
Start: 2022-01-14 | End: 2022-01-14 | Stop reason: HOSPADM

## 2022-01-14 RX ORDER — FENTANYL CITRATE 50 UG/ML
50 INJECTION, SOLUTION INTRAMUSCULAR; INTRAVENOUS ONCE
Status: COMPLETED | OUTPATIENT
Start: 2022-01-14 | End: 2022-01-14

## 2022-01-14 RX ORDER — LISINOPRIL 10 MG/1
10 TABLET ORAL DAILY
COMMUNITY
End: 2022-01-26 | Stop reason: ALTCHOICE

## 2022-01-14 RX ORDER — WARFARIN SODIUM 5 MG/1
5 TABLET ORAL
COMMUNITY
End: 2022-01-20 | Stop reason: SDUPTHER

## 2022-01-14 RX ADMIN — ENOXAPARIN SODIUM 110 MG: 120 INJECTION SUBCUTANEOUS at 14:36

## 2022-01-14 RX ADMIN — FENTANYL CITRATE 50 MCG: 50 INJECTION INTRAMUSCULAR; INTRAVENOUS at 14:04

## 2022-01-14 RX ADMIN — ONDANSETRON 4 MG: 2 INJECTION INTRAMUSCULAR; INTRAVENOUS at 14:02

## 2022-01-14 NOTE — ED NOTES
Pt stated she was diagnosed with two blood clots in her right leg and has been taking her coumadin. Last night pt states she started having worsening pain on the right left behind her knee and in her groin region. Pt states this is not where the pain originated so she was concerned      Anna Hernandez, RN  01/14/22 8497

## 2022-01-14 NOTE — ED PROVIDER NOTES
Subjective   Patient with known DVT in the right lower extremity.  Patient having pain in the popliteal and the medial portion of the leg.  Patient states her INR was checked 2 days ago was noted be 1.4 and patient was upped on her Coumadin.  Patient has been Coumadin for approximately a week after coming off Xarelto she could not afford to stay on the treatment regime any longer.  Patient was concerned with the pain that she has in the legs as she was having difficulty with taking care of her children which she does at home.  No fevers or chills, no shortness of breath.  Normal vital signs on arrival.          Review of Systems   Constitutional: Negative.  Negative for appetite change, chills and fever.   HENT: Negative.  Negative for congestion.    Eyes: Negative.  Negative for photophobia and visual disturbance.   Respiratory: Negative.  Negative for cough, chest tightness and shortness of breath.    Cardiovascular: Negative.  Negative for chest pain and palpitations.   Gastrointestinal: Negative.  Negative for abdominal pain, constipation, diarrhea, nausea and vomiting.   Endocrine: Negative.    Genitourinary: Negative.  Negative for decreased urine volume, dysuria, flank pain and hematuria.   Musculoskeletal: Negative.  Negative for arthralgias, back pain, myalgias, neck pain and neck stiffness.   Skin: Negative.  Negative for pallor.   Neurological: Negative.  Negative for dizziness, syncope, weakness, light-headedness, numbness and headaches.   Psychiatric/Behavioral: Negative.  Negative for confusion and suicidal ideas. The patient is not nervous/anxious.    All other systems reviewed and are negative.      Past Medical History:   Diagnosis Date   • Dvt femoral (deep venous thrombosis) (HCC)        No Known Allergies    Past Surgical History:   Procedure Laterality Date   •  SECTION     • CHOLECYSTECTOMY     • TUBAL ABDOMINAL LIGATION Bilateral     2016       Family History   Problem Relation Age of  Onset   • Hypertension Mother    • Cancer Paternal Grandmother    • Heart disease Paternal Grandfather        Social History     Socioeconomic History   • Marital status:    Tobacco Use   • Smoking status: Former Smoker     Quit date: 2015     Years since quittin.5   • Smokeless tobacco: Never Used   Substance and Sexual Activity   • Alcohol use: No     Comment: special occasions   • Drug use: No   • Sexual activity: Yes     Partners: Male     Birth control/protection: Surgical           Objective   Physical Exam  Vitals and nursing note reviewed.   Constitutional:       General: She is not in acute distress.     Appearance: She is well-developed. She is not diaphoretic.   HENT:      Head: Normocephalic and atraumatic.      Nose: Nose normal.   Eyes:      General: No scleral icterus.     Conjunctiva/sclera: Conjunctivae normal.   Neck:      Vascular: No JVD.   Cardiovascular:      Rate and Rhythm: Normal rate and regular rhythm.      Heart sounds: Normal heart sounds. No murmur heard.  No friction rub. No gallop.    Pulmonary:      Effort: Pulmonary effort is normal. No respiratory distress.      Breath sounds: No wheezing or rales.   Chest:      Chest wall: No tenderness.   Abdominal:      General: There is no distension.      Palpations: Abdomen is soft. There is no mass.      Tenderness: There is no abdominal tenderness. There is no guarding or rebound.   Musculoskeletal:         General: Tenderness present. No deformity. Normal range of motion.      Cervical back: Normal range of motion and neck supple.      Right lower leg: Edema present.      Comments: Patient with tenderness in the femoral vessels on the right.  There is no palpable cord though exam somewhat limited secondary to body habitus.  There is tenderness in the popliteal region that is subacute from the patient's prior DVT.   Lymphadenopathy:      Cervical: No cervical adenopathy.   Skin:     General: Skin is warm and dry.       Coloration: Skin is not pale.      Findings: No erythema or rash.   Neurological:      Mental Status: She is alert and oriented to person, place, and time.      Cranial Nerves: No cranial nerve deficit.      Motor: No abnormal muscle tone.   Psychiatric:         Behavior: Behavior normal.         Thought Content: Thought content normal.         Judgment: Judgment normal.         Procedures           ED Course  ED Course as of 01/14/22 1527   Fri Jan 14, 2022   1527 No change in the clot burden in the lower extremity.  Patient given Lovenox after discussion with Radha Meneses from heart and vascular service.  Patient follow-up with Coumadin clinic on Monday for reevaluation. [PC]      ED Course User Index  [PC] Live Berry MD                                           Labs Reviewed   PROTIME-INR - Abnormal; Notable for the following components:       Result Value    Protime 17.0 (*)     INR 1.41 (*)     All other components within normal limits    Narrative:     Therapeutic range for most indications is 2.0-3.0 INR,  or 2.5-3.5 for mechanical heart valves.   CBC WITH AUTO DIFFERENTIAL - Abnormal; Notable for the following components:    RBC 5.44 (*)     All other components within normal limits   BASIC METABOLIC PANEL - Normal    Narrative:     GFR Normal >60  Chronic Kidney Disease <60  Kidney Failure <15     RAINBOW DRAW    Narrative:     The following orders were created for panel order [place] Draw.  Procedure                               Abnormality         Status                     ---------                               -----------         ------                     Green Top (Gel)[[id number]]                                  Final result               Lavender Top[[id number]]                                     Final result               Gold Top - SST[[id number]]                                   Final result               Light Blue Top[[id number]]                                   Final result                  Please view results for these tests on the individual orders.   GREEN TOP   LAVENDER TOP   GOLD TOP - SST   LIGHT BLUE TOP   CBC AND DIFFERENTIAL    Narrative:     The following orders were created for panel order CBC & Differential.  Procedure                               Abnormality         Status                     ---------                               -----------         ------                     CBC Auto Differential[[id number]]        Abnormal            Final result                 Please view results for these tests on the individual orders.       US Venous Doppler Lower Extremity Right (duplex)    (Results Pending)               MDM    Final diagnoses:   Chronic deep vein thrombosis (DVT) of femoral vein of right lower extremity (HCC)       ED Disposition  ED Disposition     ED Disposition Condition Comment    Discharge Stable           COUMADIN CLINIC CT VASC    On 1/17/2022  For further evaluation and management         Medication List      No changes were made to your prescriptions during this visit.          Live Berry MD  01/14/22 152

## 2022-01-14 NOTE — PROGRESS NOTES
January 14, 2022    Hello, may I speak with Leona Cardoso?    My name is Tomeka Montgomery, clinical coordinator    I am  with [facility] CARDIOTHORACIC SURGERY  [address] [place] [address] [address] [place] [address] [phone].    Before we get started may I verify your date of birth? 1980    I am calling to officially welcome you to our practice and ask about your recent visit. Is this a good time to talk? No, had to leave a vm    Tell me about your visit with us. What things went well?  n/a       We're always looking for ways to make our patients' experiences even better. Do you have recommendations on ways we may improve?  n/a    Overall were you satisfied with your first visit to our practice? n/a       I appreciate you taking the time to speak with me today. Is there anything else I can do for you? n/a      Thank you, and have a great day.

## 2022-01-14 NOTE — DISCHARGE INSTRUCTIONS
Followup in the coumadin clinic on Monday for further evaluation.  Return with any new or worsening symptoms, or any concerns.

## 2022-01-14 NOTE — ED NOTES
Pt stated that she was here on the 4th and dx with blood clot and the pain has gotten worse in the right leg      Bela Ferris, RN  01/14/22 1381

## 2022-01-17 ENCOUNTER — ANTICOAGULATION VISIT (OUTPATIENT)
Dept: CARDIAC SURGERY | Facility: CLINIC | Age: 42
End: 2022-01-17

## 2022-01-17 VITALS — HEART RATE: 98 BPM | OXYGEN SATURATION: 99 %

## 2022-01-17 DIAGNOSIS — D68.59 PROTEIN S DEFICIENCY: Primary | ICD-10-CM

## 2022-01-17 DIAGNOSIS — Z51.81 ENCOUNTER FOR THERAPEUTIC DRUG MONITORING: ICD-10-CM

## 2022-01-17 DIAGNOSIS — Z86.718 HISTORY OF DVT OF LOWER EXTREMITY: ICD-10-CM

## 2022-01-17 DIAGNOSIS — Z79.01 LONG TERM (CURRENT) USE OF ANTICOAGULANTS: ICD-10-CM

## 2022-01-17 LAB — INR PPP: 2 (ref 0.9–1.1)

## 2022-01-17 PROCEDURE — 93793 ANTICOAG MGMT PT WARFARIN: CPT | Performed by: NURSE PRACTITIONER

## 2022-01-17 PROCEDURE — 36416 COLLJ CAPILLARY BLOOD SPEC: CPT | Performed by: NURSE PRACTITIONER

## 2022-01-17 PROCEDURE — 85610 PROTHROMBIN TIME: CPT | Performed by: NURSE PRACTITIONER

## 2022-01-17 NOTE — PROGRESS NOTES
Pt states she has been taking Tylenol prn for pain. Pt denies other med changes except she did get a Lovenox injection in the ER on 1/14. Pt denies bleeding problems. Pt instructed on coumadin dosing and appt made for 3 days; pt verbalized. Patient instructed regarding medication; results given and questions answered. Nutritional counseling given.  Dietary factors affecting therapy addressed.  Patient instructed to monitor for excessive bruising or bleeding. Findings reported by Tracie Walker RN.    Today's INR is Lab Results - Last 18 Months   Lab Units 01/17/22  0000   INR  2.00*

## 2022-01-20 ENCOUNTER — ANTICOAGULATION VISIT (OUTPATIENT)
Dept: CARDIAC SURGERY | Facility: CLINIC | Age: 42
End: 2022-01-20

## 2022-01-20 VITALS — OXYGEN SATURATION: 100 % | HEART RATE: 97 BPM

## 2022-01-20 DIAGNOSIS — Z86.718 HISTORY OF DVT OF LOWER EXTREMITY: ICD-10-CM

## 2022-01-20 DIAGNOSIS — Z79.01 LONG TERM (CURRENT) USE OF ANTICOAGULANTS: ICD-10-CM

## 2022-01-20 DIAGNOSIS — Z51.81 ENCOUNTER FOR THERAPEUTIC DRUG MONITORING: ICD-10-CM

## 2022-01-20 DIAGNOSIS — D68.59 PROTEIN S DEFICIENCY: Primary | ICD-10-CM

## 2022-01-20 LAB — INR PPP: 2.5 (ref 0.9–1.1)

## 2022-01-20 PROCEDURE — 93793 ANTICOAG MGMT PT WARFARIN: CPT | Performed by: NURSE PRACTITIONER

## 2022-01-20 PROCEDURE — 85610 PROTHROMBIN TIME: CPT | Performed by: NURSE PRACTITIONER

## 2022-01-20 PROCEDURE — 36416 COLLJ CAPILLARY BLOOD SPEC: CPT | Performed by: NURSE PRACTITIONER

## 2022-01-20 RX ORDER — WARFARIN SODIUM 5 MG/1
TABLET ORAL
Qty: 50 TABLET | Refills: 0 | Status: SHIPPED | OUTPATIENT
Start: 2022-01-20 | End: 2022-02-22 | Stop reason: DRUGHIGH

## 2022-01-20 NOTE — PROGRESS NOTES
Pt denies med changes or bleeding problems. Dose not adjusted but rearranged. Pt was instructed to have a serving of green veggies once weekly; pt verbalized. Patient instructed regarding medication; results given and questions answered. Nutritional counseling given.  Dietary factors affecting therapy addressed.  Patient instructed to monitor for excessive bruising or bleeding. Will recheck next week.   Findings reported by Tracie Walker RN.   Today's INR is Lab Results - Last 18 Months   Lab Units 01/20/22  0000   INR  2.50*

## 2022-01-26 ENCOUNTER — ANTICOAGULATION VISIT (OUTPATIENT)
Dept: CARDIAC SURGERY | Facility: CLINIC | Age: 42
End: 2022-01-26

## 2022-01-26 VITALS — OXYGEN SATURATION: 99 % | HEART RATE: 86 BPM

## 2022-01-26 DIAGNOSIS — Z86.718 HISTORY OF DVT OF LOWER EXTREMITY: ICD-10-CM

## 2022-01-26 DIAGNOSIS — Z79.01 LONG TERM (CURRENT) USE OF ANTICOAGULANTS: ICD-10-CM

## 2022-01-26 DIAGNOSIS — D68.59 PROTEIN S DEFICIENCY: Primary | ICD-10-CM

## 2022-01-26 DIAGNOSIS — Z51.81 ENCOUNTER FOR THERAPEUTIC DRUG MONITORING: ICD-10-CM

## 2022-01-26 LAB — INR PPP: 3.2 (ref 0.9–1.1)

## 2022-01-26 PROCEDURE — 93793 ANTICOAG MGMT PT WARFARIN: CPT | Performed by: NURSE PRACTITIONER

## 2022-01-26 PROCEDURE — 36416 COLLJ CAPILLARY BLOOD SPEC: CPT | Performed by: NURSE PRACTITIONER

## 2022-01-26 PROCEDURE — 85610 PROTHROMBIN TIME: CPT | Performed by: NURSE PRACTITIONER

## 2022-01-26 RX ORDER — LOSARTAN POTASSIUM 25 MG/1
100 TABLET ORAL DAILY
COMMUNITY

## 2022-01-26 NOTE — PROGRESS NOTES
Pt states she is no longer on Lisinopril but is on Losartan. Pt denies bleeding problems. Pt was instructed on new dosing and to have a serving of green veggies today; pt verbalized. Patient instructed regarding medication; results given and questions answered. Nutritional counseling given.  Dietary factors affecting therapy addressed.  Patient instructed to monitor for excessive bruising or bleeding. Will recheck next week. Findings reported by Tracie Walker RN.    Today's INR is Lab Results - Last 18 Months   Lab Units 01/26/22  0000   INR  3.20*

## 2022-02-02 ENCOUNTER — ANTICOAGULATION VISIT (OUTPATIENT)
Dept: CARDIAC SURGERY | Facility: CLINIC | Age: 42
End: 2022-02-02

## 2022-02-02 VITALS — HEART RATE: 78 BPM | OXYGEN SATURATION: 98 %

## 2022-02-02 DIAGNOSIS — D68.59 PROTEIN S DEFICIENCY: Primary | ICD-10-CM

## 2022-02-02 DIAGNOSIS — Z86.718 HISTORY OF DVT OF LOWER EXTREMITY: ICD-10-CM

## 2022-02-02 DIAGNOSIS — Z51.81 ENCOUNTER FOR THERAPEUTIC DRUG MONITORING: ICD-10-CM

## 2022-02-02 DIAGNOSIS — Z79.01 LONG TERM (CURRENT) USE OF ANTICOAGULANTS: ICD-10-CM

## 2022-02-02 LAB — INR PPP: 3.4 (ref 0.9–1.1)

## 2022-02-02 PROCEDURE — 36416 COLLJ CAPILLARY BLOOD SPEC: CPT | Performed by: NURSE PRACTITIONER

## 2022-02-02 PROCEDURE — 85610 PROTHROMBIN TIME: CPT | Performed by: NURSE PRACTITIONER

## 2022-02-02 PROCEDURE — 93793 ANTICOAG MGMT PT WARFARIN: CPT | Performed by: NURSE PRACTITIONER

## 2022-02-02 NOTE — PROGRESS NOTES
Pt states no changes to meds or diet.  Pt denies bleeding issues.  Adjusted coumadin dose and instructed pt to increase Vit K intake today with a cup serving of broccoli.  Recheck INR next wk.  Pt verbalizes.  Patient instructed regarding medication; results given and questions answered. Nutritional counseling given.  Dietary factors affecting therapy addressed.  Patient instructed to monitor for excessive bruising or bleeding.  Findings reported by Evangelina Harrison RN.   Today's INR is Lab Results - Last 18 Months   Lab Units 02/02/22  0000   INR  3.40*

## 2022-02-09 ENCOUNTER — ANTICOAGULATION VISIT (OUTPATIENT)
Dept: CARDIAC SURGERY | Facility: CLINIC | Age: 42
End: 2022-02-09

## 2022-02-09 VITALS — HEART RATE: 105 BPM | OXYGEN SATURATION: 98 %

## 2022-02-09 DIAGNOSIS — Z86.718 HISTORY OF DVT OF LOWER EXTREMITY: ICD-10-CM

## 2022-02-09 DIAGNOSIS — D68.59 PROTEIN S DEFICIENCY: Primary | ICD-10-CM

## 2022-02-09 DIAGNOSIS — Z79.01 LONG TERM (CURRENT) USE OF ANTICOAGULANTS: ICD-10-CM

## 2022-02-09 DIAGNOSIS — Z51.81 ENCOUNTER FOR THERAPEUTIC DRUG MONITORING: ICD-10-CM

## 2022-02-09 LAB — INR PPP: 1.5 (ref 0.9–1.1)

## 2022-02-09 PROCEDURE — 85610 PROTHROMBIN TIME: CPT | Performed by: NURSE PRACTITIONER

## 2022-02-09 PROCEDURE — 93793 ANTICOAG MGMT PT WARFARIN: CPT | Performed by: NURSE PRACTITIONER

## 2022-02-09 PROCEDURE — 36416 COLLJ CAPILLARY BLOOD SPEC: CPT | Performed by: NURSE PRACTITIONER

## 2022-02-09 NOTE — PROGRESS NOTES
Pt denies med changes or bleeding problems. Pt states she did miss her coumadin dose Sunday. Dose slightly adjusted today and pt instructed to hold green veggies for now; pt verbalized. Patient instructed regarding medication; results given and questions answered. Nutritional counseling given.  Dietary factors affecting therapy addressed.  Patient instructed to monitor for excessive bruising or bleeding. Will recheck after Dr Jackson sees her on Monday. Findings reported by Tracie Walker RN.    Today's INR is Lab Results - Last 18 Months   Lab Units 02/09/22  0000   INR  1.50*

## 2022-02-14 ENCOUNTER — LAB (OUTPATIENT)
Dept: ONCOLOGY | Facility: HOSPITAL | Age: 42
End: 2022-02-14

## 2022-02-14 ENCOUNTER — CONSULT (OUTPATIENT)
Dept: ONCOLOGY | Facility: CLINIC | Age: 42
End: 2022-02-14

## 2022-02-14 ENCOUNTER — ANTICOAGULATION VISIT (OUTPATIENT)
Dept: CARDIAC SURGERY | Facility: CLINIC | Age: 42
End: 2022-02-14

## 2022-02-14 VITALS
OXYGEN SATURATION: 98 % | SYSTOLIC BLOOD PRESSURE: 152 MMHG | TEMPERATURE: 97.1 F | BODY MASS INDEX: 42.79 KG/M2 | RESPIRATION RATE: 18 BRPM | HEART RATE: 68 BPM | DIASTOLIC BLOOD PRESSURE: 103 MMHG | WEIGHT: 249.3 LBS

## 2022-02-14 VITALS — OXYGEN SATURATION: 98 % | HEART RATE: 71 BPM

## 2022-02-14 DIAGNOSIS — Z51.81 ENCOUNTER FOR THERAPEUTIC DRUG MONITORING: ICD-10-CM

## 2022-02-14 DIAGNOSIS — D68.59 PROTEIN S DEFICIENCY: Primary | ICD-10-CM

## 2022-02-14 DIAGNOSIS — Z86.718 HISTORY OF DVT OF LOWER EXTREMITY: ICD-10-CM

## 2022-02-14 DIAGNOSIS — Z79.01 LONG TERM (CURRENT) USE OF ANTICOAGULANTS: ICD-10-CM

## 2022-02-14 DIAGNOSIS — D68.59 PROTEIN S DEFICIENCY: Chronic | ICD-10-CM

## 2022-02-14 DIAGNOSIS — I82.511 CHRONIC DEEP VEIN THROMBOSIS (DVT) OF FEMORAL VEIN OF RIGHT LOWER EXTREMITY: Primary | ICD-10-CM

## 2022-02-14 LAB — INR PPP: 2 (ref 0.9–1.1)

## 2022-02-14 PROCEDURE — 93793 ANTICOAG MGMT PT WARFARIN: CPT | Performed by: NURSE PRACTITIONER

## 2022-02-14 PROCEDURE — 99203 OFFICE O/P NEW LOW 30 MIN: CPT | Performed by: INTERNAL MEDICINE

## 2022-02-14 PROCEDURE — G0463 HOSPITAL OUTPT CLINIC VISIT: HCPCS | Performed by: INTERNAL MEDICINE

## 2022-02-14 PROCEDURE — 36416 COLLJ CAPILLARY BLOOD SPEC: CPT | Performed by: NURSE PRACTITIONER

## 2022-02-14 PROCEDURE — 85610 PROTHROMBIN TIME: CPT | Performed by: NURSE PRACTITIONER

## 2022-02-14 NOTE — PROGRESS NOTES
Patient states no med changes or bleeding problems or unexplained bruising. Patient instructed to continue current dosing schedule. Verbalizes understanding. Will recheck 1 week prior to spacing appts.  Patient instructed regarding medication; results given and questions answered. Nutritional counseling given.  Dietary factors affecting therapy addressed.  Patient instructed to monitor for excessive bruising or bleeding.  Findings reported by Evangelina Harrison RN.   Today's INR is Lab Results - Last 18 Months   Lab Units 02/14/22  0000   INR  2.00*

## 2022-02-15 NOTE — PROGRESS NOTES
Chief Complaint  DVT    Subjective          Leona Cardoso presents to [facility]  History of Present Illness     This is a pleasant 41-year-old female who was seen in consultation at the request of Tami Esparza MD for evaluation of DVT.  Patient tells me that she had total of 3 episodes of DVT.  First episode was after her second pregnancy in 1998.  She took anticoagulation for about 6-month and remained free of any further episode of VTE until 2006 where she had another episode of VTE involving right lower extremity and postpartum..  She was again treated for 6 months of anticoagulation.  She has multiple family members including her mother, grandmother as well as sister with VTE.  Patient also had laboratory testing performed which has at some point showed protein S deficiency.  Patient was recently on a trip to [place] and develop right leg pain and was diagnosed with right lower extremity DVT.  She is currently taking Coumadin and is tolerating it well without any bleeding issues.  I been asked to assist with evaluation and management of her VTE.      Objective   Vital Signs:   BP (!) 152/103   Pulse 68   Temp 97.1 °F (36.2 °C)   Resp 18   Wt 113 kg (249 lb 4.8 oz)   SpO2 98%   BMI 42.79 kg/m²     Physical Exam  Vitals and nursing note reviewed.   Constitutional:       Appearance: Normal appearance.   Musculoskeletal:      Right lower leg: Edema present.      Left lower leg: Edema present.   Neurological:      General: No focal deficit present.      Mental Status: She is alert and oriented to person, place, and time. Mental status is at baseline.   Psychiatric:         Mood and Affect: Mood normal.         Behavior: Behavior normal.         Thought Content: Thought content normal.        Result Review :   The following data was reviewed by: Iris Lux MD on 02/14/2022:  Common labs    Common Labsle 1/14/22 1/14/22    1230 1234    Glucose  88   BUN  13   Creatinine  0.74   eGFR Non African Am  86   Sodium  138   Potassium  3.9   Chloride  104   Calcium  8.7   WBC 6.91    Hemoglobin 15.1    Hematocrit 45.3    Platelets 277            CMP    CMP 1/14/22   Glucose 88   BUN 13   Creatinine 0.74   eGFR Non African Am 86   Sodium 138   Potassium 3.9   Chloride 104   Calcium 8.7           CBC    CBC 1/14/22   WBC 6.91   RBC 5.44 (A)   Hemoglobin 15.1   Hematocrit 45.3   MCV 83.3   MCH 27.8   MCHC 33.3   RDW 12.7   Platelets 277   (A) Abnormal value            CBC w/diff    CBC w/Diff 1/14/22   WBC 6.91   RBC 5.44 (A)   Hemoglobin 15.1   Hematocrit 45.3   MCV 83.3   MCH 27.8   MCHC 33.3   RDW 12.7   Platelets 277   Neutrophil Rel % 60.7   Immature Granulocyte Rel % 0.3   Lymphocyte Rel % 24.3   Monocyte Rel % 11.1   Eosinophil Rel % 2.9   Basophil Rel % 0.7   (A) Abnormal value            Leona Cardoso reports a pain score of 0.  Given her pain assessment as noted, treatment options were discussed and the following options were decided upon as a follow-up plan to address the patient's pain: continuation of current treatment plan for pain.    Patient screened positive for depression based on a PHQ-9 score of 0 on 2/14/2022. Follow-up recommendations include: Suicide Risk Assessment performed.        Assessment and Plan    Diagnoses and all orders for this visit:    1. Chronic deep vein thrombosis (DVT) of femoral vein of right lower extremity (HCC) (Primary)    2. Protein S deficiency (HCC)    New diagnosis.  Patient with history of recurrent light lower extremity DVT (3 episodes total -1996, 2006, January 2022).  She also has multiple family members with VTE on mother side as well as labs showing documented protein S deficiency.    I had a very lengthy discussion with patient about her overall diagnosis, prognosis, risk of recurrence and treatment options.    Given multiple episodes of VTE I anticipate her overall risk of recurrence to be  very high in the absence of anticoagulation.  She has tolerated anticoagulation well without any bleeding issue.  I recommend she stay on long-term likely lifelong anticoagulation.  We will closely monitor for bleeding complications.  As long as her risk of VTE remains higher than risk of bleeding I would continue anticoagulation.    Patient understood and was agreeable to this plan.  She is currently taking Coumadin however her primary care provider is working on getting her Xarelto at lower cost.  Discussed with patient that both Coumadin and Xarelto works effectively in preventing recurrent thrombosis as long as is taken faithfully.    She understand and was agreeable to this recommendation.  I will plan to see her back in 6 months with CBC, CMP.          Follow Up   No follow-ups on file.  Patient was given instructions and counseling regarding her condition or for health maintenance advice. Please see specific information pulled into the AVS if appropriate.

## 2022-02-21 ENCOUNTER — ANTICOAGULATION VISIT (OUTPATIENT)
Dept: CARDIAC SURGERY | Facility: CLINIC | Age: 42
End: 2022-02-21

## 2022-02-21 VITALS — HEART RATE: 80 BPM | OXYGEN SATURATION: 96 %

## 2022-02-21 DIAGNOSIS — Z86.718 HISTORY OF DVT OF LOWER EXTREMITY: ICD-10-CM

## 2022-02-21 DIAGNOSIS — D68.59 PROTEIN S DEFICIENCY: Primary | ICD-10-CM

## 2022-02-21 DIAGNOSIS — Z79.01 LONG TERM (CURRENT) USE OF ANTICOAGULANTS: ICD-10-CM

## 2022-02-21 DIAGNOSIS — Z51.81 ENCOUNTER FOR THERAPEUTIC DRUG MONITORING: ICD-10-CM

## 2022-02-21 LAB — INR PPP: 2.2 (ref 0.9–1.1)

## 2022-02-21 PROCEDURE — 85610 PROTHROMBIN TIME: CPT | Performed by: NURSE PRACTITIONER

## 2022-02-21 PROCEDURE — 36416 COLLJ CAPILLARY BLOOD SPEC: CPT | Performed by: NURSE PRACTITIONER

## 2022-02-21 PROCEDURE — 93793 ANTICOAG MGMT PT WARFARIN: CPT | Performed by: NURSE PRACTITIONER

## 2022-02-21 NOTE — PROGRESS NOTES
Patient states no med changes or bleeding problems or unexplained bruising. Patient instructed to continue current dosing schedule. Verbalizes understanding. Will recheck in 2 wks.  Patient instructed regarding medication; results given and questions answered. Nutritional counseling given.  Dietary factors affecting therapy addressed.  Patient instructed to monitor for excessive bruising or bleeding.  Findings reported by Evangelina Harrison RN.   Today's INR is Lab Results - Last 18 Months   Lab Units 02/21/22  0000   INR  2.20*

## 2022-02-22 RX ORDER — WARFARIN SODIUM 7.5 MG/1
TABLET ORAL
Qty: 95 TABLET | Refills: 0 | Status: SHIPPED | OUTPATIENT
Start: 2022-02-22 | End: 2023-01-06

## 2022-03-01 NOTE — PROGRESS NOTES
CLINICAL PHARMACY NOTE: MEDS TO BEDS    Total # of Prescriptions Filled: 2   The following medications were delivered to the patient:  · allopurinal  · colchicine    Additional Documentation: meds delivered to the pt and daughter in room 501 on 03.01.22 at 17:30, co pay $38.89, paid cash Subjective   Leona Cardoso is a 38 y.o. female.     History of Present Illness requesting evaluation week long history of sinus pressure drainage URI symptoms sore throat etc. also exposed to strep and children.  No fever no chills.  History noted.    The following portions of the patient's history were reviewed and updated as appropriate: allergies, current medications, past family history, past medical history, past social history, past surgical history and problem list.    Review of Systems   Constitutional: Negative for activity change, appetite change, fatigue and unexpected weight change.   HENT: Positive for congestion, rhinorrhea, sinus pain and sore throat. Negative for trouble swallowing and voice change.    Eyes: Negative for redness and visual disturbance.   Respiratory: Negative for cough and wheezing.    Cardiovascular: Negative for chest pain and palpitations.   Gastrointestinal: Negative for abdominal pain, constipation, diarrhea, nausea and vomiting.   Genitourinary: Negative for urgency.   Musculoskeletal: Negative for joint swelling.   Neurological: Positive for headaches. Negative for syncope.   Hematological: Negative for adenopathy.   Psychiatric/Behavioral: Negative for sleep disturbance.       Objective   Physical Exam   Constitutional: She is oriented to person, place, and time. She appears well-developed.   HENT:   Head: Normocephalic.   Right Ear: External ear normal.   Nose: Mucosal edema and rhinorrhea present.   Mouth/Throat: Posterior oropharyngeal erythema present.   Discolored postnasal drip sinuses tender   Eyes: Pupils are equal, round, and reactive to light.   Neck: Normal range of motion. No thyromegaly present.   Cardiovascular: Normal rate, regular rhythm, normal heart sounds and intact distal pulses. Exam reveals no gallop and no friction rub.   No murmur heard.  Pulmonary/Chest: Breath sounds normal.   Abdominal: Soft. She exhibits no distension and no mass. There is no  tenderness.   Musculoskeletal: Normal range of motion.   Neurological: She is alert and oriented to person, place, and time. She has normal reflexes.   Skin: Skin is warm and dry.   Psychiatric: She has a normal mood and affect.       Assessment/Plan   Leona was seen today for sore throat and uri.    Diagnoses and all orders for this visit:    Acute URI    Acute pharyngitis, unspecified etiology    Acute non-recurrent maxillary sinusitis  -     cephalexin (KEFLEX) 500 MG capsule; Take 1 capsule by mouth 3 (Three) Times a Day. Till gone    Other orders  -     fluticasone (FLONASE) 50 MCG/ACT nasal spray; 2 sprays into the nostril(s) as directed by provider Daily. Till symptoms gone        on Kelsie pot use fluid short-term medication observation recheck direct

## 2022-03-08 ENCOUNTER — ANTICOAGULATION VISIT (OUTPATIENT)
Dept: CARDIAC SURGERY | Facility: CLINIC | Age: 42
End: 2022-03-08

## 2022-03-08 VITALS — HEART RATE: 76 BPM | OXYGEN SATURATION: 100 %

## 2022-03-08 DIAGNOSIS — D68.59 PROTEIN S DEFICIENCY: Primary | ICD-10-CM

## 2022-03-08 DIAGNOSIS — Z79.01 LONG TERM (CURRENT) USE OF ANTICOAGULANTS: ICD-10-CM

## 2022-03-08 DIAGNOSIS — Z51.81 ENCOUNTER FOR THERAPEUTIC DRUG MONITORING: ICD-10-CM

## 2022-03-08 DIAGNOSIS — Z86.718 HISTORY OF DVT OF LOWER EXTREMITY: ICD-10-CM

## 2022-03-08 LAB — INR PPP: 2.3 (ref 0.9–1.1)

## 2022-03-08 PROCEDURE — 85610 PROTHROMBIN TIME: CPT | Performed by: NURSE PRACTITIONER

## 2022-03-08 PROCEDURE — 36416 COLLJ CAPILLARY BLOOD SPEC: CPT | Performed by: NURSE PRACTITIONER

## 2022-03-08 PROCEDURE — 93793 ANTICOAG MGMT PT WARFARIN: CPT | Performed by: NURSE PRACTITIONER

## 2022-03-08 NOTE — PROGRESS NOTES
Patient states no med changes or bleeding problems or unexplained bruising. Patient instructed to continue current dosing schedule. Verbalizes understanding. Will recheck in 3 weeks. Patient instructed regarding medication; results given and questions answered. Nutritional counseling given.  Dietary factors affecting therapy addressed.  Patient instructed to monitor for excessive bruising or bleeding. Findings reported by Tracie Walker RN.    Today's INR is Lab Results - Last 18 Months   Lab Units 03/08/22  0000   INR  2.30*

## 2022-03-29 ENCOUNTER — ANTICOAGULATION VISIT (OUTPATIENT)
Dept: CARDIAC SURGERY | Facility: CLINIC | Age: 42
End: 2022-03-29

## 2022-03-29 VITALS — OXYGEN SATURATION: 98 % | HEART RATE: 112 BPM

## 2022-03-29 DIAGNOSIS — Z86.718 HISTORY OF DVT OF LOWER EXTREMITY: ICD-10-CM

## 2022-03-29 DIAGNOSIS — Z51.81 ENCOUNTER FOR THERAPEUTIC DRUG MONITORING: ICD-10-CM

## 2022-03-29 DIAGNOSIS — D68.59 PROTEIN S DEFICIENCY: Primary | ICD-10-CM

## 2022-03-29 DIAGNOSIS — Z79.01 LONG TERM (CURRENT) USE OF ANTICOAGULANTS: ICD-10-CM

## 2022-03-29 LAB — INR PPP: 1.6 (ref 0.9–1.1)

## 2022-03-29 PROCEDURE — 93793 ANTICOAG MGMT PT WARFARIN: CPT | Performed by: NURSE PRACTITIONER

## 2022-03-29 PROCEDURE — 36416 COLLJ CAPILLARY BLOOD SPEC: CPT | Performed by: NURSE PRACTITIONER

## 2022-03-29 PROCEDURE — 85610 PROTHROMBIN TIME: CPT | Performed by: NURSE PRACTITIONER

## 2022-03-29 NOTE — PROGRESS NOTES
Pt denies coumadin doses or consuming too much green.  Pt states medications have not changed; no bleeding issues.  Adjusted coumadin dose and instructed pt to limit green intake for three days.  Recheck INR next wk.  Pt verbalizes.  Patient instructed regarding medication; results given and questions answered. Nutritional counseling given.  Dietary factors affecting therapy addressed.  Patient instructed to monitor for excessive bruising or bleeding.  Findings reported by Evangelina Harrison RN.   Today's INR is Lab Results - Last 18 Months   Lab Units 03/29/22  0000   INR  1.60*

## 2022-04-04 ENCOUNTER — ANTICOAGULATION VISIT (OUTPATIENT)
Dept: CARDIAC SURGERY | Facility: CLINIC | Age: 42
End: 2022-04-04

## 2022-04-04 DIAGNOSIS — Z86.718 HISTORY OF DVT OF LOWER EXTREMITY: ICD-10-CM

## 2022-04-04 DIAGNOSIS — D68.59 PROTEIN S DEFICIENCY: Primary | ICD-10-CM

## 2022-04-04 DIAGNOSIS — Z79.01 LONG TERM (CURRENT) USE OF ANTICOAGULANTS: ICD-10-CM

## 2022-04-04 DIAGNOSIS — Z51.81 ENCOUNTER FOR THERAPEUTIC DRUG MONITORING: ICD-10-CM

## 2022-08-05 DIAGNOSIS — I82.511 CHRONIC DEEP VEIN THROMBOSIS (DVT) OF FEMORAL VEIN OF RIGHT LOWER EXTREMITY: Primary | ICD-10-CM

## 2022-08-15 ENCOUNTER — APPOINTMENT (OUTPATIENT)
Dept: ONCOLOGY | Facility: HOSPITAL | Age: 42
End: 2022-08-15

## 2022-08-15 ENCOUNTER — APPOINTMENT (OUTPATIENT)
Dept: ONCOLOGY | Facility: CLINIC | Age: 42
End: 2022-08-15

## 2023-01-06 ENCOUNTER — APPOINTMENT (OUTPATIENT)
Dept: GENERAL RADIOLOGY | Facility: HOSPITAL | Age: 43
End: 2023-01-06

## 2023-01-06 ENCOUNTER — HOSPITAL ENCOUNTER (EMERGENCY)
Facility: HOSPITAL | Age: 43
Discharge: HOME | End: 2023-01-06
Attending: STUDENT IN AN ORGANIZED HEALTH CARE EDUCATION/TRAINING PROGRAM | Admitting: STUDENT IN AN ORGANIZED HEALTH CARE EDUCATION/TRAINING PROGRAM

## 2023-01-06 VITALS
HEIGHT: 64 IN | HEART RATE: 92 BPM | BODY MASS INDEX: 44.39 KG/M2 | SYSTOLIC BLOOD PRESSURE: 133 MMHG | TEMPERATURE: 97.8 F | RESPIRATION RATE: 18 BRPM | WEIGHT: 260 LBS | DIASTOLIC BLOOD PRESSURE: 86 MMHG | OXYGEN SATURATION: 97 %

## 2023-01-06 DIAGNOSIS — R10.9 ABDOMINAL CRAMPING: Primary | ICD-10-CM

## 2023-01-06 DIAGNOSIS — R19.7 DIARRHEA, UNSPECIFIED TYPE: ICD-10-CM

## 2023-01-06 DIAGNOSIS — R11.0 NAUSEA: ICD-10-CM

## 2023-01-06 LAB
ALBUMIN SERPL-MCNC: 3.9 G/DL (ref 3.5–5.2)
ALBUMIN/GLOB SERPL: 1.1 G/DL
ALP SERPL-CCNC: 91 U/L (ref 39–117)
ALT SERPL W P-5'-P-CCNC: 16 U/L (ref 1–33)
ANION GAP SERPL CALCULATED.3IONS-SCNC: 10 MMOL/L (ref 5–15)
APTT PPP: 36.6 SECONDS (ref 20–40.3)
AST SERPL-CCNC: 15 U/L (ref 1–32)
BASOPHILS # BLD AUTO: 0.03 10*3/MM3 (ref 0–0.2)
BASOPHILS NFR BLD AUTO: 0.3 % (ref 0–1.5)
BILIRUB SERPL-MCNC: 0.4 MG/DL (ref 0–1.2)
BUN SERPL-MCNC: 12 MG/DL (ref 6–20)
BUN/CREAT SERPL: 16.7 (ref 7–25)
CALCIUM SPEC-SCNC: 8.6 MG/DL (ref 8.6–10.5)
CHLORIDE SERPL-SCNC: 102 MMOL/L (ref 98–107)
CO2 SERPL-SCNC: 23 MMOL/L (ref 22–29)
CREAT SERPL-MCNC: 0.72 MG/DL (ref 0.57–1)
D-DIMER, QUANTITATIVE (MAD,POW, STR): 300 NG/ML (FEU) (ref 0–500)
D-LACTATE SERPL-SCNC: 1.4 MMOL/L (ref 0.5–2)
DEPRECATED RDW RBC AUTO: 38 FL (ref 37–54)
EGFRCR SERPLBLD CKD-EPI 2021: 106.5 ML/MIN/1.73
EOSINOPHIL # BLD AUTO: 0.35 10*3/MM3 (ref 0–0.4)
EOSINOPHIL NFR BLD AUTO: 4 % (ref 0.3–6.2)
ERYTHROCYTE [DISTWIDTH] IN BLOOD BY AUTOMATED COUNT: 13 % (ref 12.3–15.4)
GLOBULIN UR ELPH-MCNC: 3.5 GM/DL
GLUCOSE SERPL-MCNC: 107 MG/DL (ref 65–99)
HCG INTACT+B SERPL-ACNC: <0.1 MIU/ML
HCT VFR BLD AUTO: 43.8 % (ref 34–46.6)
HGB BLD-MCNC: 14.5 G/DL (ref 12–15.9)
HOLD SPECIMEN: NORMAL
HOLD SPECIMEN: NORMAL
IMM GRANULOCYTES # BLD AUTO: 0.03 10*3/MM3 (ref 0–0.05)
IMM GRANULOCYTES NFR BLD AUTO: 0.3 % (ref 0–0.5)
INR PPP: 1.2 (ref 0.8–1.2)
LYMPHOCYTES # BLD AUTO: 1.5 10*3/MM3 (ref 0.7–3.1)
LYMPHOCYTES NFR BLD AUTO: 17.3 % (ref 19.6–45.3)
MCH RBC QN AUTO: 27 PG (ref 26.6–33)
MCHC RBC AUTO-ENTMCNC: 33.1 G/DL (ref 31.5–35.7)
MCV RBC AUTO: 81.4 FL (ref 79–97)
MONOCYTES # BLD AUTO: 0.67 10*3/MM3 (ref 0.1–0.9)
MONOCYTES NFR BLD AUTO: 7.7 % (ref 5–12)
NEUTROPHILS NFR BLD AUTO: 6.11 10*3/MM3 (ref 1.7–7)
NEUTROPHILS NFR BLD AUTO: 70.4 % (ref 42.7–76)
NRBC BLD AUTO-RTO: 0 /100 WBC (ref 0–0.2)
NT-PROBNP SERPL-MCNC: <36 PG/ML (ref 0–450)
PLATELET # BLD AUTO: 287 10*3/MM3 (ref 140–450)
PMV BLD AUTO: 9 FL (ref 6–12)
POTASSIUM SERPL-SCNC: 3.7 MMOL/L (ref 3.5–5.2)
PROT SERPL-MCNC: 7.4 G/DL (ref 6–8.5)
PROTHROMBIN TIME: 15.1 SECONDS (ref 11.1–15.3)
RBC # BLD AUTO: 5.38 10*6/MM3 (ref 3.77–5.28)
SODIUM SERPL-SCNC: 135 MMOL/L (ref 136–145)
TROPONIN T SERPL-MCNC: <0.01 NG/ML (ref 0–0.03)
WBC NRBC COR # BLD: 8.69 10*3/MM3 (ref 3.4–10.8)
WHOLE BLOOD HOLD COAG: NORMAL
WHOLE BLOOD HOLD SPECIMEN: NORMAL

## 2023-01-06 PROCEDURE — 87147 CULTURE TYPE IMMUNOLOGIC: CPT | Performed by: STUDENT IN AN ORGANIZED HEALTH CARE EDUCATION/TRAINING PROGRAM

## 2023-01-06 PROCEDURE — 85025 COMPLETE CBC W/AUTO DIFF WBC: CPT

## 2023-01-06 PROCEDURE — 85610 PROTHROMBIN TIME: CPT

## 2023-01-06 PROCEDURE — 87150 DNA/RNA AMPLIFIED PROBE: CPT | Performed by: STUDENT IN AN ORGANIZED HEALTH CARE EDUCATION/TRAINING PROGRAM

## 2023-01-06 PROCEDURE — 80053 COMPREHEN METABOLIC PANEL: CPT

## 2023-01-06 PROCEDURE — 85730 THROMBOPLASTIN TIME PARTIAL: CPT

## 2023-01-06 PROCEDURE — 25010000002 KETOROLAC TROMETHAMINE PER 15 MG: Performed by: STUDENT IN AN ORGANIZED HEALTH CARE EDUCATION/TRAINING PROGRAM

## 2023-01-06 PROCEDURE — 83605 ASSAY OF LACTIC ACID: CPT

## 2023-01-06 PROCEDURE — 93005 ELECTROCARDIOGRAM TRACING: CPT | Performed by: STUDENT IN AN ORGANIZED HEALTH CARE EDUCATION/TRAINING PROGRAM

## 2023-01-06 PROCEDURE — 96374 THER/PROPH/DIAG INJ IV PUSH: CPT

## 2023-01-06 PROCEDURE — 96375 TX/PRO/DX INJ NEW DRUG ADDON: CPT

## 2023-01-06 PROCEDURE — 74022 RADEX COMPL AQT ABD SERIES: CPT

## 2023-01-06 PROCEDURE — 85379 FIBRIN DEGRADATION QUANT: CPT

## 2023-01-06 PROCEDURE — 25010000002 PROCHLORPERAZINE 10 MG/2ML SOLUTION: Performed by: STUDENT IN AN ORGANIZED HEALTH CARE EDUCATION/TRAINING PROGRAM

## 2023-01-06 PROCEDURE — 83880 ASSAY OF NATRIURETIC PEPTIDE: CPT | Performed by: STUDENT IN AN ORGANIZED HEALTH CARE EDUCATION/TRAINING PROGRAM

## 2023-01-06 PROCEDURE — 84702 CHORIONIC GONADOTROPIN TEST: CPT | Performed by: STUDENT IN AN ORGANIZED HEALTH CARE EDUCATION/TRAINING PROGRAM

## 2023-01-06 PROCEDURE — 36415 COLL VENOUS BLD VENIPUNCTURE: CPT

## 2023-01-06 PROCEDURE — 99283 EMERGENCY DEPT VISIT LOW MDM: CPT

## 2023-01-06 PROCEDURE — 93010 ELECTROCARDIOGRAM REPORT: CPT | Performed by: INTERNAL MEDICINE

## 2023-01-06 PROCEDURE — 87040 BLOOD CULTURE FOR BACTERIA: CPT

## 2023-01-06 PROCEDURE — 84484 ASSAY OF TROPONIN QUANT: CPT | Performed by: STUDENT IN AN ORGANIZED HEALTH CARE EDUCATION/TRAINING PROGRAM

## 2023-01-06 PROCEDURE — 93005 ELECTROCARDIOGRAM TRACING: CPT

## 2023-01-06 RX ORDER — SODIUM CHLORIDE 0.9 % (FLUSH) 0.9 %
10 SYRINGE (ML) INJECTION AS NEEDED
Status: DISCONTINUED | OUTPATIENT
Start: 2023-01-06 | End: 2023-01-06 | Stop reason: HOSPADM

## 2023-01-06 RX ORDER — KETOROLAC TROMETHAMINE 15 MG/ML
15 INJECTION, SOLUTION INTRAMUSCULAR; INTRAVENOUS ONCE
Status: COMPLETED | OUTPATIENT
Start: 2023-01-06 | End: 2023-01-06

## 2023-01-06 RX ORDER — ONDANSETRON 4 MG/1
4 TABLET, ORALLY DISINTEGRATING ORAL 4 TIMES DAILY PRN
Qty: 12 TABLET | Refills: 0 | Status: SHIPPED | OUTPATIENT
Start: 2023-01-06

## 2023-01-06 RX ORDER — PROCHLORPERAZINE EDISYLATE 5 MG/ML
5 INJECTION INTRAMUSCULAR; INTRAVENOUS ONCE
Status: COMPLETED | OUTPATIENT
Start: 2023-01-06 | End: 2023-01-06

## 2023-01-06 RX ADMIN — PROCHLORPERAZINE EDISYLATE 5 MG: 5 INJECTION INTRAMUSCULAR; INTRAVENOUS at 17:13

## 2023-01-06 RX ADMIN — SODIUM CHLORIDE, POTASSIUM CHLORIDE, SODIUM LACTATE AND CALCIUM CHLORIDE 1000 ML: 600; 310; 30; 20 INJECTION, SOLUTION INTRAVENOUS at 17:11

## 2023-01-06 RX ADMIN — KETOROLAC TROMETHAMINE 15 MG: 15 INJECTION, SOLUTION INTRAMUSCULAR; INTRAVENOUS at 17:13

## 2023-01-06 NOTE — ED PROVIDER NOTES
Subjective   History of Present Illness  42-year-old female comes to the ER chief complaint of diarrhea that started this morning.  She endorses having abdominal cramping and sharp stomach pains as well.  This afternoon the sharp pain moved to her chest and has been intermittent.  She has had some nausea, but no vomiting.  She denies other symptoms.  Nothing is made her feel better or worse.  Patient is on Xarelto and has a history of blood clots.  She has not had a blood clot while she has been on the blood thinner.    History provided by:  Patient   used: No        Review of Systems   Constitutional: Negative for chills and fever.   HENT: Negative for drooling.    Eyes: Negative for redness.   Respiratory: Negative for cough, chest tightness, shortness of breath and wheezing.    Cardiovascular: Negative for chest pain.   Gastrointestinal: Positive for abdominal pain, diarrhea and nausea. Negative for constipation and vomiting.   Genitourinary: Negative for flank pain.   Skin: Negative for color change.   Neurological: Negative for seizures.   Psychiatric/Behavioral: Negative for confusion.       Past Medical History:   Diagnosis Date   • Dvt femoral (deep venous thrombosis) (HCC)        No Known Allergies    Past Surgical History:   Procedure Laterality Date   •  SECTION     • CHOLECYSTECTOMY     • TUBAL ABDOMINAL LIGATION Bilateral            Family History   Problem Relation Age of Onset   • Hypertension Mother    • Cancer Paternal Grandmother    • Heart disease Paternal Grandfather        Social History     Socioeconomic History   • Marital status:    Tobacco Use   • Smoking status: Former     Types: Cigarettes     Quit date: 2015     Years since quittin.5   • Smokeless tobacco: Never   Substance and Sexual Activity   • Alcohol use: No     Comment: special occasions   • Drug use: No   • Sexual activity: Yes     Partners: Male     Birth control/protection: Surgical  "          Objective    Vitals:    01/06/23 1443 01/06/23 1555 01/06/23 1707   BP: (!) 160/102 134/90 149/95   BP Location: Right arm     Patient Position: Sitting     Pulse: 117 108 107   Resp: 22 18 18   Temp: 97.8 °F (36.6 °C)     TempSrc: Oral     SpO2: 99% 100% 100%   Weight: 118 kg (260 lb)     Height: 162.6 cm (64\")         Physical Exam  Vitals and nursing note reviewed.   Constitutional:       General: She is not in acute distress.     Appearance: She is well-developed. She is obese. She is not ill-appearing, toxic-appearing or diaphoretic.   Eyes:      Conjunctiva/sclera: Conjunctivae normal.   Pulmonary:      Effort: Pulmonary effort is normal. No accessory muscle usage or respiratory distress.      Breath sounds: No wheezing.   Chest:      Chest wall: Tenderness present.   Abdominal:      General: Bowel sounds are normal.      Palpations: Abdomen is soft.      Tenderness: There is abdominal tenderness (deep palpation). There is no guarding or rebound.   Skin:     General: Skin is warm and dry.      Capillary Refill: Capillary refill takes less than 2 seconds.   Neurological:      Mental Status: She is alert and oriented to person, place, and time.         ECG 12 Lead      Date/Time: 1/6/2023 5:58 PM  Performed by: Preet Mast MD  Authorized by: Preet Mast MD   Interpreted by physician  Rhythm: sinus tachycardia  Rate: tachycardic  BPM: 105  QRS axis: normal  ST segment elevation noted on lead: none.  Clinical impression: abnormal ECG                 ED Course      Results for orders placed or performed during the hospital encounter of 01/06/23   Comprehensive Metabolic Panel    Specimen: Blood   Result Value Ref Range    Glucose 107 (H) 65 - 99 mg/dL    BUN 12 6 - 20 mg/dL    Creatinine 0.72 0.57 - 1.00 mg/dL    Sodium 135 (L) 136 - 145 mmol/L    Potassium 3.7 3.5 - 5.2 mmol/L    Chloride 102 98 - 107 mmol/L    CO2 23.0 22.0 - 29.0 mmol/L    Calcium 8.6 8.6 - 10.5 mg/dL    Total Protein 7.4 " 6.0 - 8.5 g/dL    Albumin 3.9 3.5 - 5.2 g/dL    ALT (SGPT) 16 1 - 33 U/L    AST (SGOT) 15 1 - 32 U/L    Alkaline Phosphatase 91 39 - 117 U/L    Total Bilirubin 0.4 0.0 - 1.2 mg/dL    Globulin 3.5 gm/dL    A/G Ratio 1.1 g/dL    BUN/Creatinine Ratio 16.7 7.0 - 25.0    Anion Gap 10.0 5.0 - 15.0 mmol/L    eGFR 106.5 >60.0 mL/min/1.73   Lactic Acid, Plasma    Specimen: Blood   Result Value Ref Range    Lactate 1.4 0.5 - 2.0 mmol/L   D-dimer, Quantitative    Specimen: Blood   Result Value Ref Range    D-Dimer, Quantitative 300 0 - 500 ng/mL (FEU)   CBC Auto Differential    Specimen: Blood   Result Value Ref Range    WBC 8.69 3.40 - 10.80 10*3/mm3    RBC 5.38 (H) 3.77 - 5.28 10*6/mm3    Hemoglobin 14.5 12.0 - 15.9 g/dL    Hematocrit 43.8 34.0 - 46.6 %    MCV 81.4 79.0 - 97.0 fL    MCH 27.0 26.6 - 33.0 pg    MCHC 33.1 31.5 - 35.7 g/dL    RDW 13.0 12.3 - 15.4 %    RDW-SD 38.0 37.0 - 54.0 fl    MPV 9.0 6.0 - 12.0 fL    Platelets 287 140 - 450 10*3/mm3    Neutrophil % 70.4 42.7 - 76.0 %    Lymphocyte % 17.3 (L) 19.6 - 45.3 %    Monocyte % 7.7 5.0 - 12.0 %    Eosinophil % 4.0 0.3 - 6.2 %    Basophil % 0.3 0.0 - 1.5 %    Immature Grans % 0.3 0.0 - 0.5 %    Neutrophils, Absolute 6.11 1.70 - 7.00 10*3/mm3    Lymphocytes, Absolute 1.50 0.70 - 3.10 10*3/mm3    Monocytes, Absolute 0.67 0.10 - 0.90 10*3/mm3    Eosinophils, Absolute 0.35 0.00 - 0.40 10*3/mm3    Basophils, Absolute 0.03 0.00 - 0.20 10*3/mm3    Immature Grans, Absolute 0.03 0.00 - 0.05 10*3/mm3    nRBC 0.0 0.0 - 0.2 /100 WBC   Protime-INR    Specimen: Blood   Result Value Ref Range    Protime 15.1 11.1 - 15.3 Seconds    INR 1.20 0.80 - 1.20   aPTT    Specimen: Blood   Result Value Ref Range    PTT 36.6 20.0 - 40.3 seconds   hCG, Quantitative, Pregnancy    Specimen: Blood   Result Value Ref Range    HCG Quantitative <0.10 mIU/mL   Troponin    Specimen: Blood   Result Value Ref Range    Troponin T <0.010 0.000 - 0.030 ng/mL   ECG 12 Lead Chest Pain   Result Value Ref Range     QT Interval 322 ms    QTC Interval 425 ms   Green Top (Gel)   Result Value Ref Range    Extra Tube Hold for add-ons.    Lavender Top   Result Value Ref Range    Extra Tube hold for add-on    Gold Top - SST   Result Value Ref Range    Extra Tube Hold for add-ons.    Light Blue Top   Result Value Ref Range    Extra Tube Hold for add-ons.      XR Abdomen 2+ VW with Chest 1 VW   Final Result   Conclusion:   No Acute Process      [id number]      Electronically signed by:  Aguila Bonilla MD  1/6/2023 5:24 PM ticckle   Workstation: [id number]                Medical Decision Making  Vital signs are stable, afebrile.  Labs unremarkable.  Troponin and D-dimer negative.  Abdominal and chest x-ray negative for acute findings.  Recommend patient follow-up with her PCP.  Return precautions given.  Patient states understanding and is agreeable to the plan.    Abdominal cramping: acute illness or injury  Diarrhea, unspecified type: acute illness or injury  Nausea: acute illness or injury  Amount and/or Complexity of Data Reviewed  Labs: ordered.  Radiology: ordered.  ECG/medicine tests: ordered.      Risk  Prescription drug management.          Final diagnoses:   Abdominal cramping   Nausea   Diarrhea, unspecified type       ED Disposition  ED Disposition     ED Disposition   Discharge    Condition   Stable    Comment   --             Tami Esparza MD  [address] [id number] [phone]    Schedule an appointment as soon as possible for a visit in 2 days  ER follow up         Medication List      New Prescriptions    ondansetron ODT 4 MG disintegrating tablet  Commonly known as: ZOFRAN-ODT  Place 1 tablet on the tongue 4 (Four) Times a Day As Needed for Nausea or Vomiting.           Where to Get Your Medications      These medications were sent to [facility] [address] [place] [address] [employer] [address] - [phone]  - [phone] [facility] [employer] [facility] [address]    Phone: [phone]    · ondansetron ODT 4 MG disintegrating tablet          Preet Mast MD  01/06/23 2836

## 2023-01-07 LAB
BACTERIA BLD CULT: ABNORMAL
BOTTLE TYPE: ABNORMAL

## 2023-01-08 LAB
BACTERIA SPEC AEROBE CULT: ABNORMAL
GRAM STN SPEC: ABNORMAL
GRAM STN SPEC: ABNORMAL
ISOLATED FROM: ABNORMAL
QT INTERVAL: 322 MS
QTC INTERVAL: 425 MS

## 2023-01-11 LAB — BACTERIA SPEC AEROBE CULT: NORMAL

## 2023-05-18 ENCOUNTER — HOSPITAL ENCOUNTER (OUTPATIENT)
Dept: ULTRASOUND IMAGING | Facility: HOSPITAL | Age: 43
Discharge: HOME | End: 2023-05-18
Payer: COMMERCIAL

## 2023-05-18 DIAGNOSIS — R19.03 ABDOMINAL MASS, RIGHT LOWER QUADRANT: ICD-10-CM

## 2023-05-18 DIAGNOSIS — R10.2 PELVIC PAIN IN FEMALE: ICD-10-CM

## 2023-05-18 PROCEDURE — 76700 US EXAM ABDOM COMPLETE: CPT

## 2023-05-18 PROCEDURE — 76830 TRANSVAGINAL US NON-OB: CPT

## 2023-07-24 ENCOUNTER — TELEPHONE (OUTPATIENT)
Dept: ONCOLOGY | Facility: CLINIC | Age: 43
End: 2023-07-24
Payer: COMMERCIAL

## 2023-07-25 ENCOUNTER — OFFICE VISIT (OUTPATIENT)
Dept: SURGERY | Facility: CLINIC | Age: 43
End: 2023-07-25
Payer: COMMERCIAL

## 2023-07-25 ENCOUNTER — OFFICE VISIT (OUTPATIENT)
Dept: WOUND CARE | Facility: HOSPITAL | Age: 43
End: 2023-07-25
Payer: COMMERCIAL

## 2023-07-25 ENCOUNTER — OUTSIDE FACILITY SERVICE (OUTPATIENT)
Dept: WOUND CARE | Facility: HOSPITAL | Age: 43
End: 2023-07-25
Payer: COMMERCIAL

## 2023-07-25 VITALS
HEIGHT: 64 IN | HEART RATE: 87 BPM | DIASTOLIC BLOOD PRESSURE: 84 MMHG | SYSTOLIC BLOOD PRESSURE: 126 MMHG | TEMPERATURE: 96.8 F | BODY MASS INDEX: 44.22 KG/M2 | WEIGHT: 259 LBS

## 2023-07-25 DIAGNOSIS — K43.2 INCISIONAL HERNIA, WITHOUT OBSTRUCTION OR GANGRENE: Primary | ICD-10-CM

## 2023-07-25 PROCEDURE — 99204 OFFICE O/P NEW MOD 45 MIN: CPT | Performed by: SURGERY

## 2023-07-25 PROCEDURE — 1160F RVW MEDS BY RX/DR IN RCRD: CPT | Performed by: SURGERY

## 2023-07-25 PROCEDURE — 1159F MED LIST DOCD IN RCRD: CPT | Performed by: SURGERY

## 2023-07-25 PROCEDURE — G0463 HOSPITAL OUTPT CLINIC VISIT: HCPCS

## 2023-07-25 RX ORDER — SODIUM CHLORIDE 9 MG/ML
40 INJECTION, SOLUTION INTRAVENOUS AS NEEDED
OUTPATIENT
Start: 2023-08-01

## 2023-07-25 RX ORDER — MONTELUKAST SODIUM 4 MG/1
1 TABLET, CHEWABLE ORAL DAILY PRN
COMMUNITY
Start: 2023-06-27 | End: 2023-07-28

## 2023-07-25 RX ORDER — SODIUM CHLORIDE 0.9 % (FLUSH) 0.9 %
10 SYRINGE (ML) INJECTION EVERY 12 HOURS SCHEDULED
OUTPATIENT
Start: 2023-08-01

## 2023-07-25 RX ORDER — BUPIVACAINE HCL/0.9 % NACL/PF 0.1 %
2000 PLASTIC BAG, INJECTION (ML) EPIDURAL ONCE
OUTPATIENT
Start: 2023-08-01 | End: 2023-07-25

## 2023-07-25 RX ORDER — SODIUM CHLORIDE 0.9 % (FLUSH) 0.9 %
10 SYRINGE (ML) INJECTION AS NEEDED
OUTPATIENT
Start: 2023-08-01

## 2023-07-25 RX ORDER — LOSARTAN POTASSIUM 100 MG/1
100 TABLET ORAL DAILY
COMMUNITY

## 2023-07-25 NOTE — PROGRESS NOTES
Subjective   Leona Cardoso is a 43 y.o. female     Chief Complaint: Incisional hernia    Referred by Dr. Kaur over concern about an incisional hernia.  Hernia is likely from a prior .  Patient quit smoking 8 years ago.  She has intermittent pain associated with this.  She feels a lump goes down when she lays down.   was in 2016.  She takes Xarelto for history of protein C&S deficiency.  She had a history of blood clots in her right leg in the past.  No other abdominal surgery other than laparoscopic cholecystectomy.  No GI or urinary complaints.  CT scan confirmed this hernia with 1 fascial defect.  Defect is about 3 x 3 x 3 cm in size.    Hernia  Associated symptoms include abdominal pain, arthralgias and neck pain.      Review of Systems   Constitutional: Negative.    HENT: Negative.     Eyes: Negative.    Respiratory: Negative.     Cardiovascular:  Positive for leg swelling.   Gastrointestinal:  Positive for abdominal pain.        Low mid   Endocrine: Negative.    Genitourinary: Negative.    Musculoskeletal:  Positive for arthralgias, back pain and neck pain.   Skin:  Positive for color change.        Rt lower leg   Allergic/Immunologic: Negative.    Neurological: Negative.    Hematological: Negative.    Psychiatric/Behavioral: Negative.     Past Medical History:   Diagnosis Date    Dvt femoral (deep venous thrombosis)      Past Surgical History:   Procedure Laterality Date     SECTION      CHOLECYSTECTOMY      TUBAL ABDOMINAL LIGATION Bilateral          Family History   Problem Relation Age of Onset    Hypertension Mother     Cancer Paternal Grandmother     Heart disease Paternal Grandfather      Social History     Socioeconomic History    Marital status:    Tobacco Use    Smoking status: Former     Types: Cigarettes     Quit date: 2015     Years since quittin.0    Smokeless tobacco: Never   Vaping Use    Vaping Use: Never used   Substance and Sexual Activity     Alcohol use: No     Comment: special occasions    Drug use: No    Sexual activity: Yes     Partners: Male     Birth control/protection: Surgical     No Known Allergies  Vitals:    07/25/23 1312   BP: 126/84   Pulse: 87   Temp: 96.8 °F (36 °C)       Home Medications:  Prior to Admission medications    Medication Sig Start Date End Date Taking? Authorizing Provider   colestipol (COLESTID) 1 g tablet Take 1 tablet by mouth. 6/27/23 7/28/23 Yes Doni Daigle MD   losartan (COZAAR) 100 MG tablet 1 tablet Daily.   Yes Doni Daigle MD   rivaroxaban (XARELTO) 20 MG tablet Take 1 tablet by mouth Daily.   Yes Doni Daigle MD   losartan (COZAAR) 25 MG tablet Take 100 mg by mouth Daily.  7/25/23  Doni Daigle MD   ondansetron ODT (ZOFRAN-ODT) 4 MG disintegrating tablet Place 1 tablet on the tongue 4 (Four) Times a Day As Needed for Nausea or Vomiting. 1/6/23 7/25/23  Preet Mast MD       Objective   Physical Exam  Constitutional:       General: She is not in acute distress.     Appearance: Normal appearance. She is well-developed. She is obese. She is not ill-appearing, toxic-appearing or diaphoretic.   HENT:      Head: Normocephalic and atraumatic.   Eyes:      General: No scleral icterus.     Conjunctiva/sclera: Conjunctivae normal.   Neck:      Thyroid: No thyromegaly.      Trachea: No tracheal deviation.   Cardiovascular:      Rate and Rhythm: Normal rate and regular rhythm.      Heart sounds: Normal heart sounds. No murmur heard.    No friction rub. No gallop.   Pulmonary:      Effort: Pulmonary effort is normal. No respiratory distress.      Breath sounds: Normal breath sounds. No stridor. No wheezing or rales.   Chest:      Chest wall: No tenderness.   Abdominal:      General: Bowel sounds are normal. There is no distension.      Palpations: Abdomen is soft. There is no mass.      Tenderness: There is no abdominal tenderness. There is no guarding or rebound.      Hernia: A  hernia is present.   Musculoskeletal:         General: No deformity. Normal range of motion.      Cervical back: Normal range of motion and neck supple.   Lymphadenopathy:      Cervical: No cervical adenopathy.   Skin:     General: Skin is warm and dry.      Coloration: Skin is not pale.      Findings: No erythema or rash.      Nails: There is no clubbing.   Neurological:      Mental Status: She is alert and oriented to person, place, and time.   Psychiatric:         Behavior: Behavior normal.         Thought Content: Thought content normal.   Right lower quadrant hernia partially reducible to the right of midline    Reviewed CT scan and agree    Assessment & Plan incisional hernia right lower abdominal wall.  Recommend repair using a robotic approach..  Patient is aware that we will likely use mesh for this repair.  She clearly understands the procedure alternatives risk benefits and wishes to proceed.  We will plan on holding her Xarelto for 2 days prior to the surgery.      There were no encounter diagnoses.                     This document has been electronically signed by Monique Muniz CSA on July 25, 2023 13:19 CDT

## 2023-07-25 NOTE — H&P (VIEW-ONLY)
Subjective   Leona Cardoso is a 43 y.o. female     Chief Complaint: Incisional hernia    Referred by Dr. Kaur over concern about an incisional hernia.  Hernia is likely from a prior .  Patient quit smoking 8 years ago.  She has intermittent pain associated with this.  She feels a lump goes down when she lays down.   was in 2016.  She takes Xarelto for history of protein C&S deficiency.  She had a history of blood clots in her right leg in the past.  No other abdominal surgery other than laparoscopic cholecystectomy.  No GI or urinary complaints.  CT scan confirmed this hernia with 1 fascial defect.  Defect is about 3 x 3 x 3 cm in size.    Hernia  Associated symptoms include abdominal pain, arthralgias and neck pain.      Review of Systems   Constitutional: Negative.    HENT: Negative.     Eyes: Negative.    Respiratory: Negative.     Cardiovascular:  Positive for leg swelling.   Gastrointestinal:  Positive for abdominal pain.        Low mid   Endocrine: Negative.    Genitourinary: Negative.    Musculoskeletal:  Positive for arthralgias, back pain and neck pain.   Skin:  Positive for color change.        Rt lower leg   Allergic/Immunologic: Negative.    Neurological: Negative.    Hematological: Negative.    Psychiatric/Behavioral: Negative.     Past Medical History:   Diagnosis Date    Dvt femoral (deep venous thrombosis)      Past Surgical History:   Procedure Laterality Date     SECTION      CHOLECYSTECTOMY      TUBAL ABDOMINAL LIGATION Bilateral          Family History   Problem Relation Age of Onset    Hypertension Mother     Cancer Paternal Grandmother     Heart disease Paternal Grandfather      Social History     Socioeconomic History    Marital status:    Tobacco Use    Smoking status: Former     Types: Cigarettes     Quit date: 2015     Years since quittin.0    Smokeless tobacco: Never   Vaping Use    Vaping Use: Never used   Substance and Sexual Activity     Alcohol use: No     Comment: special occasions    Drug use: No    Sexual activity: Yes     Partners: Male     Birth control/protection: Surgical     No Known Allergies  Vitals:    07/25/23 1312   BP: 126/84   Pulse: 87   Temp: 96.8 øF (36 øC)       Home Medications:  Prior to Admission medications    Medication Sig Start Date End Date Taking? Authorizing Provider   colestipol (COLESTID) 1 g tablet Take 1 tablet by mouth. 6/27/23 7/28/23 Yes Doni Daigle MD   losartan (COZAAR) 100 MG tablet 1 tablet Daily.   Yes ProviderDoni MD   rivaroxaban (XARELTO) 20 MG tablet Take 1 tablet by mouth Daily.   Yes Doni Daigle MD   losartan (COZAAR) 25 MG tablet Take 100 mg by mouth Daily.  7/25/23  Doni Daigle MD   ondansetron ODT (ZOFRAN-ODT) 4 MG disintegrating tablet Place 1 tablet on the tongue 4 (Four) Times a Day As Needed for Nausea or Vomiting. 1/6/23 7/25/23  Preet Mast MD       Objective   Physical Exam  Constitutional:       General: She is not in acute distress.     Appearance: Normal appearance. She is well-developed. She is obese. She is not ill-appearing, toxic-appearing or diaphoretic.   HENT:      Head: Normocephalic and atraumatic.   Eyes:      General: No scleral icterus.     Conjunctiva/sclera: Conjunctivae normal.   Neck:      Thyroid: No thyromegaly.      Trachea: No tracheal deviation.   Cardiovascular:      Rate and Rhythm: Normal rate and regular rhythm.      Heart sounds: Normal heart sounds. No murmur heard.    No friction rub. No gallop.   Pulmonary:      Effort: Pulmonary effort is normal. No respiratory distress.      Breath sounds: Normal breath sounds. No stridor. No wheezing or rales.   Chest:      Chest wall: No tenderness.   Abdominal:      General: Bowel sounds are normal. There is no distension.      Palpations: Abdomen is soft. There is no mass.      Tenderness: There is no abdominal tenderness. There is no guarding or rebound.      Hernia: A  hernia is present.   Musculoskeletal:         General: No deformity. Normal range of motion.      Cervical back: Normal range of motion and neck supple.   Lymphadenopathy:      Cervical: No cervical adenopathy.   Skin:     General: Skin is warm and dry.      Coloration: Skin is not pale.      Findings: No erythema or rash.      Nails: There is no clubbing.   Neurological:      Mental Status: She is alert and oriented to person, place, and time.   Psychiatric:         Behavior: Behavior normal.         Thought Content: Thought content normal.   Right lower quadrant hernia partially reducible to the right of midline    Reviewed CT scan and agree    Assessment & Plan incisional hernia right lower abdominal wall.  Recommend repair using a robotic approach..  Patient is aware that we will likely use mesh for this repair.  She clearly understands the procedure alternatives risk benefits and wishes to proceed.  We will plan on holding her Xarelto for 2 days prior to the surgery.      There were no encounter diagnoses.                     This document has been electronically signed by Monique Muniz CSA on July 25, 2023 13:19 CDT

## 2023-07-26 ENCOUNTER — PRE-ADMISSION TESTING (OUTPATIENT)
Dept: PREADMISSION TESTING | Facility: HOSPITAL | Age: 43
End: 2023-07-26
Payer: COMMERCIAL

## 2023-07-26 VITALS
BODY MASS INDEX: 44.22 KG/M2 | DIASTOLIC BLOOD PRESSURE: 90 MMHG | RESPIRATION RATE: 16 BRPM | OXYGEN SATURATION: 97 % | HEIGHT: 64 IN | HEART RATE: 86 BPM | WEIGHT: 259 LBS | SYSTOLIC BLOOD PRESSURE: 170 MMHG

## 2023-07-26 DIAGNOSIS — I82.511 CHRONIC DEEP VEIN THROMBOSIS (DVT) OF FEMORAL VEIN OF RIGHT LOWER EXTREMITY: Primary | ICD-10-CM

## 2023-07-26 LAB
MRSA DNA SPEC QL NAA+PROBE: NEGATIVE
QT INTERVAL: 368 MS
QTC INTERVAL: 424 MS

## 2023-07-26 PROCEDURE — 93005 ELECTROCARDIOGRAM TRACING: CPT

## 2023-07-26 PROCEDURE — 87641 MR-STAPH DNA AMP PROBE: CPT

## 2023-07-26 RX ORDER — SODIUM CHLORIDE, SODIUM GLUCONATE, SODIUM ACETATE, POTASSIUM CHLORIDE AND MAGNESIUM CHLORIDE 526; 502; 368; 37; 30 MG/100ML; MG/100ML; MG/100ML; MG/100ML; MG/100ML
1000 INJECTION, SOLUTION INTRAVENOUS CONTINUOUS PRN
Status: CANCELLED | OUTPATIENT
Start: 2023-08-01

## 2023-07-26 NOTE — PAT
Chlorhexidine scrub given with instruction sheet.  Instruction reviewed in PAT, understanding verbalized.    Patient states she was instructed on stopping blood thinner by surgeon

## 2023-08-01 ENCOUNTER — ANESTHESIA (OUTPATIENT)
Dept: PERIOP | Facility: HOSPITAL | Age: 43
End: 2023-08-01
Payer: COMMERCIAL

## 2023-08-01 ENCOUNTER — HOSPITAL ENCOUNTER (OUTPATIENT)
Facility: HOSPITAL | Age: 43
Setting detail: HOSPITAL OUTPATIENT SURGERY
Discharge: HOME | End: 2023-08-01
Attending: SURGERY | Admitting: SURGERY
Payer: COMMERCIAL

## 2023-08-01 ENCOUNTER — ANESTHESIA EVENT (OUTPATIENT)
Dept: PERIOP | Facility: HOSPITAL | Age: 43
End: 2023-08-01
Payer: COMMERCIAL

## 2023-08-01 VITALS
OXYGEN SATURATION: 95 % | HEIGHT: 65 IN | DIASTOLIC BLOOD PRESSURE: 93 MMHG | RESPIRATION RATE: 20 BRPM | WEIGHT: 258.82 LBS | BODY MASS INDEX: 43.12 KG/M2 | SYSTOLIC BLOOD PRESSURE: 163 MMHG | TEMPERATURE: 96.8 F | HEART RATE: 67 BPM

## 2023-08-01 DIAGNOSIS — K43.2 INCISIONAL HERNIA, WITHOUT OBSTRUCTION OR GANGRENE: ICD-10-CM

## 2023-08-01 PROCEDURE — 25010000002 HYDROMORPHONE 1 MG/ML SOLUTION

## 2023-08-01 PROCEDURE — 49593 RPR AA HRN 1ST 3-10 RDC: CPT | Performed by: SURGERY

## 2023-08-01 PROCEDURE — 49593 RPR AA HRN 1ST 3-10 RDC: CPT | Performed by: SPECIALIST/TECHNOLOGIST, OTHER

## 2023-08-01 PROCEDURE — 25010000002 ONDANSETRON PER 1 MG

## 2023-08-01 PROCEDURE — 25010000002 SUGAMMADEX 200 MG/2ML SOLUTION

## 2023-08-01 PROCEDURE — 25010000002 DEXAMETHASONE PER 1 MG

## 2023-08-01 PROCEDURE — 25010000002 CEFAZOLIN PER 500 MG: Performed by: SURGERY

## 2023-08-01 PROCEDURE — 25010000002 PROPOFOL 200 MG/20ML EMULSION

## 2023-08-01 PROCEDURE — 25010000002 FENTANYL CITRATE (PF) 100 MCG/2ML SOLUTION

## 2023-08-01 PROCEDURE — C1781 MESH (IMPLANTABLE): HCPCS | Performed by: SURGERY

## 2023-08-01 RX ORDER — SODIUM CHLORIDE, SODIUM GLUCONATE, SODIUM ACETATE, POTASSIUM CHLORIDE AND MAGNESIUM CHLORIDE 526; 502; 368; 37; 30 MG/100ML; MG/100ML; MG/100ML; MG/100ML; MG/100ML
1000 INJECTION, SOLUTION INTRAVENOUS CONTINUOUS PRN
Status: DISCONTINUED | OUTPATIENT
Start: 2023-08-01 | End: 2023-08-01 | Stop reason: HOSPADM

## 2023-08-01 RX ORDER — SODIUM CHLORIDE 9 MG/ML
40 INJECTION, SOLUTION INTRAVENOUS AS NEEDED
Status: DISCONTINUED | OUTPATIENT
Start: 2023-08-01 | End: 2023-08-01 | Stop reason: HOSPADM

## 2023-08-01 RX ORDER — SODIUM CHLORIDE 0.9 % (FLUSH) 0.9 %
10 SYRINGE (ML) INJECTION AS NEEDED
Status: DISCONTINUED | OUTPATIENT
Start: 2023-08-01 | End: 2023-08-01 | Stop reason: HOSPADM

## 2023-08-01 RX ORDER — HYDROCODONE BITARTRATE AND ACETAMINOPHEN 5; 325 MG/1; MG/1
1 TABLET ORAL ONCE
Status: COMPLETED | OUTPATIENT
Start: 2023-08-01 | End: 2023-08-01

## 2023-08-01 RX ORDER — LABETALOL HYDROCHLORIDE 5 MG/ML
5 INJECTION, SOLUTION INTRAVENOUS
Status: DISCONTINUED | OUTPATIENT
Start: 2023-08-01 | End: 2023-08-01 | Stop reason: HOSPADM

## 2023-08-01 RX ORDER — IBUPROFEN 600 MG/1
600 TABLET ORAL ONCE AS NEEDED
Status: DISCONTINUED | OUTPATIENT
Start: 2023-08-01 | End: 2023-08-01 | Stop reason: HOSPADM

## 2023-08-01 RX ORDER — ONDANSETRON 2 MG/ML
4 INJECTION INTRAMUSCULAR; INTRAVENOUS ONCE AS NEEDED
Status: DISCONTINUED | OUTPATIENT
Start: 2023-08-01 | End: 2023-08-01 | Stop reason: HOSPADM

## 2023-08-01 RX ORDER — MEPERIDINE HYDROCHLORIDE 25 MG/ML
12.5 INJECTION INTRAMUSCULAR; INTRAVENOUS; SUBCUTANEOUS
Status: DISCONTINUED | OUTPATIENT
Start: 2023-08-01 | End: 2023-08-01 | Stop reason: HOSPADM

## 2023-08-01 RX ORDER — SODIUM CHLORIDE 0.9 % (FLUSH) 0.9 %
10 SYRINGE (ML) INJECTION EVERY 12 HOURS SCHEDULED
Status: DISCONTINUED | OUTPATIENT
Start: 2023-08-01 | End: 2023-08-01 | Stop reason: HOSPADM

## 2023-08-01 RX ORDER — BUPIVACAINE HYDROCHLORIDE AND EPINEPHRINE 5; 5 MG/ML; UG/ML
INJECTION, SOLUTION EPIDURAL; INTRACAUDAL; PERINEURAL AS NEEDED
Status: DISCONTINUED | OUTPATIENT
Start: 2023-08-01 | End: 2023-08-01 | Stop reason: HOSPADM

## 2023-08-01 RX ORDER — LIDOCAINE HYDROCHLORIDE 20 MG/ML
INJECTION, SOLUTION EPIDURAL; INFILTRATION; INTRACAUDAL; PERINEURAL AS NEEDED
Status: DISCONTINUED | OUTPATIENT
Start: 2023-08-01 | End: 2023-08-01 | Stop reason: SURG

## 2023-08-01 RX ORDER — PROPOFOL 10 MG/ML
INJECTION, EMULSION INTRAVENOUS AS NEEDED
Status: DISCONTINUED | OUTPATIENT
Start: 2023-08-01 | End: 2023-08-01 | Stop reason: SURG

## 2023-08-01 RX ORDER — DEXAMETHASONE SODIUM PHOSPHATE 4 MG/ML
INJECTION, SOLUTION INTRA-ARTICULAR; INTRALESIONAL; INTRAMUSCULAR; INTRAVENOUS; SOFT TISSUE AS NEEDED
Status: DISCONTINUED | OUTPATIENT
Start: 2023-08-01 | End: 2023-08-01 | Stop reason: SURG

## 2023-08-01 RX ORDER — FENTANYL CITRATE 50 UG/ML
INJECTION, SOLUTION INTRAMUSCULAR; INTRAVENOUS AS NEEDED
Status: DISCONTINUED | OUTPATIENT
Start: 2023-08-01 | End: 2023-08-01 | Stop reason: SURG

## 2023-08-01 RX ORDER — KETOROLAC TROMETHAMINE 30 MG/ML
15 INJECTION, SOLUTION INTRAMUSCULAR; INTRAVENOUS EVERY 6 HOURS PRN
Status: DISCONTINUED | OUTPATIENT
Start: 2023-08-01 | End: 2023-08-01 | Stop reason: HOSPADM

## 2023-08-01 RX ORDER — ONDANSETRON 2 MG/ML
INJECTION INTRAMUSCULAR; INTRAVENOUS AS NEEDED
Status: DISCONTINUED | OUTPATIENT
Start: 2023-08-01 | End: 2023-08-01 | Stop reason: SURG

## 2023-08-01 RX ORDER — ACETAMINOPHEN 500 MG
500 TABLET ORAL EVERY 4 HOURS PRN
COMMUNITY

## 2023-08-01 RX ORDER — HYDROCODONE BITARTRATE AND ACETAMINOPHEN 5; 325 MG/1; MG/1
1 TABLET ORAL EVERY 4 HOURS PRN
Qty: 15 TABLET | Refills: 0 | Status: SHIPPED | OUTPATIENT
Start: 2023-08-01

## 2023-08-01 RX ORDER — BUPIVACAINE HCL/0.9 % NACL/PF 0.1 %
2000 PLASTIC BAG, INJECTION (ML) EPIDURAL ONCE
Status: COMPLETED | OUTPATIENT
Start: 2023-08-01 | End: 2023-08-01

## 2023-08-01 RX ORDER — ROCURONIUM BROMIDE 10 MG/ML
INJECTION, SOLUTION INTRAVENOUS AS NEEDED
Status: DISCONTINUED | OUTPATIENT
Start: 2023-08-01 | End: 2023-08-01 | Stop reason: SURG

## 2023-08-01 RX ORDER — PSEUDOEPHEDRINE HCL 30 MG
30 TABLET ORAL EVERY 4 HOURS PRN
COMMUNITY

## 2023-08-01 RX ADMIN — PROPOFOL 100 MG: 10 INJECTION, EMULSION INTRAVENOUS at 09:47

## 2023-08-01 RX ADMIN — SODIUM CHLORIDE, SODIUM GLUCONATE, SODIUM ACETATE, POTASSIUM CHLORIDE AND MAGNESIUM CHLORIDE 1000 ML: 526; 502; 368; 37; 30 INJECTION, SOLUTION INTRAVENOUS at 06:05

## 2023-08-01 RX ADMIN — HYDROMORPHONE HYDROCHLORIDE 0.5 MG: 1 INJECTION, SOLUTION INTRAMUSCULAR; INTRAVENOUS; SUBCUTANEOUS at 10:32

## 2023-08-01 RX ADMIN — SUGAMMADEX 200 MG: 100 INJECTION, SOLUTION INTRAVENOUS at 09:45

## 2023-08-01 RX ADMIN — ROCURONIUM BROMIDE 10 MG: 10 INJECTION INTRAVENOUS at 08:24

## 2023-08-01 RX ADMIN — ROCURONIUM BROMIDE 10 MG: 10 INJECTION INTRAVENOUS at 08:00

## 2023-08-01 RX ADMIN — ROCURONIUM BROMIDE 10 MG: 10 INJECTION INTRAVENOUS at 09:21

## 2023-08-01 RX ADMIN — GLYCOPYRROLATE 0.1 MG: 0.2 INJECTION, SOLUTION INTRAMUSCULAR; INTRAVITREAL at 07:38

## 2023-08-01 RX ADMIN — HYDROCODONE BITARTRATE AND ACETAMINOPHEN 1 TABLET: 5; 325 TABLET ORAL at 11:39

## 2023-08-01 RX ADMIN — ROCURONIUM BROMIDE 10 MG: 10 INJECTION INTRAVENOUS at 08:47

## 2023-08-01 RX ADMIN — ROCURONIUM BROMIDE 50 MG: 10 INJECTION INTRAVENOUS at 07:20

## 2023-08-01 RX ADMIN — GLYCOPYRROLATE 0.1 MG: 0.2 INJECTION, SOLUTION INTRAMUSCULAR; INTRAVITREAL at 07:39

## 2023-08-01 RX ADMIN — Medication 2000 MG: at 07:24

## 2023-08-01 RX ADMIN — HYDROMORPHONE HYDROCHLORIDE 0.5 MG: 1 INJECTION, SOLUTION INTRAMUSCULAR; INTRAVENOUS; SUBCUTANEOUS at 08:56

## 2023-08-01 RX ADMIN — LIDOCAINE HYDROCHLORIDE 100 MG: 20 INJECTION, SOLUTION EPIDURAL; INFILTRATION; INTRACAUDAL; PERINEURAL at 07:18

## 2023-08-01 RX ADMIN — ONDANSETRON 4 MG: 2 INJECTION INTRAMUSCULAR; INTRAVENOUS at 07:30

## 2023-08-01 RX ADMIN — HYDROMORPHONE HYDROCHLORIDE 0.5 MG: 1 INJECTION, SOLUTION INTRAMUSCULAR; INTRAVENOUS; SUBCUTANEOUS at 10:21

## 2023-08-01 RX ADMIN — PROPOFOL 200 MG: 10 INJECTION, EMULSION INTRAVENOUS at 07:19

## 2023-08-01 RX ADMIN — DEXAMETHASONE SODIUM PHOSPHATE 4 MG: 4 INJECTION, SOLUTION INTRAMUSCULAR; INTRAVENOUS at 07:30

## 2023-08-01 RX ADMIN — FENTANYL CITRATE 100 MCG: 50 INJECTION, SOLUTION INTRAMUSCULAR; INTRAVENOUS at 07:12

## 2023-08-01 RX ADMIN — HYDROMORPHONE HYDROCHLORIDE 0.5 MG: 1 INJECTION, SOLUTION INTRAMUSCULAR; INTRAVENOUS; SUBCUTANEOUS at 09:49

## 2023-08-01 NOTE — OP NOTE
VENTRAL / INCISIONAL HERNIA REPAIR LAPAROSCOPIC WITH DAVINCI ROBOT  Procedure Note    Leona Cardoso  8/1/2023    Pre-op Diagnosis:   Incisional hernia, without obstruction or gangrene [K43.2]    Post-op Diagnosis:     Post-Op Diagnosis Codes:     * Incisional hernia, without obstruction or gangrene [K43.2]    Procedure/CPTr Codes:      Procedure(s):  VENTRAL / INCISIONAL HERNIA REPAIR LAPAROSCOPIC WITH DAVINCI ROBOT    Surgeon(s):  Brenden Kaur MD    Anesthesia: General    Staff:   Circulator: Carolann Gibson RN; Radha Jones RN  Scrub Person: Bela Head RN  Assistant: Monique Muniz CSA    Assistant: Monique Muniz CSA was responsible for performing the following activities: Retraction, Suction, Irrigation, Suturing, Closing, and Placing Dressing and their skilled assistance was necessary for the success of this case.     Estimated Blood Loss: minimal    Specimens:                None      Drains: * No LDAs found *    Indications: 43-year-old female with incisional hernia presents for robotic incisional hernia repair    Findings: 3 x 3 cm defect in the fascia repaired by closing the fascia with Stratus suture and then pariatex mesh placed without difficulty.    Complications: None    Procedure: The patient was brought to the operating room where she was placed under general endotracheal anesthesia without difficulty. She had SCDs in place. She received Ancef IV antibiotics preoperatively. The abdomen was prepped and draped in the normal sterile fashion. Appropriate timeout was taken and everyone was in agreement. We used a 5 mm VisiPort and laparoscope and were able to access the left upper quadrant at Garcia's point without any difficulty. Adequate pneumoperitoneum was obtained. No evidence of any intra-abdominal injury at the access site. TAP abdominal wall block was performed on the right side with 15 mL of 0.5% Marcaine. We then placed a robotic trocar in the right upper quadrant  and placed a camera port to the right of midline in the epigastrium. We moved the camera then to the right upper quadrant and looked back at our access site and there was no evidence of any visceral abdominal injury. We then replaced that 5 mm port with another robotic port and did a TAP abdominal wall block again with 15 mL of 0.5% Marcaine on that side. Laparoscopically we then positioned the patient. Robot was then docked and placed into position without any difficulty. Camera was placed in the middle port. Fenestrated grasper was used in the left hand and the scissors initially were used in the right hand along with the needle hamilton later. We had good access to the hernia site. There was omentum that was within the defect. No small bowel was involved. We gently bluntly reduced part of the omentum that was present in the defect and then used the scissors and some use of electrocautery to free up adhesions of the omentum in the defect and freed the omentum up completely. Good hemostasis was noted at the completion. We then assessed our defect. It was right at 3 cm, as I had estimated on CT scan. No other defects were appreciated. We used a #0 Stratus suture. We closed the defect in a craniocaudal direction with this running stitch after reducing the pressure in the abdomen from 15 down to 10. This was closed without any difficulty. We then ran backwards to the middle of our defect with the same stitch. Elected to use a 4.5 inch or 8 cm Parietex mesh. We hydrated the mesh. We placed a suture for orientation purposes in the middle of the mesh. The mesh was rolled and placed into the abdomen and then unrolled. We then used the Stratus suture to chandelier the mesh up to the anterior abdominal wall with appropriate orientation. We then brought the suture out anteriorly with a couple of bites to hold the mesh up. Needle was then removed from the Stratus in the mesh. We then placed a V-Loc suture and then secured the  edges with the running baseball type stitch V-Loc suture. We started anteriorly and then went inferiorly and then superiorly.  4 V-Loc sutures were used for this.  We were careful to avoid the epigastric vessels with our sutures, which we did. At the completion the mesh laid nicely. We pulled the omentum down over the small bowel. All  instruments were all removed. Trocars were removed. The patient was taken out of Trendelenburg position. Incisions were closed with 4-0 Monocryl subcuticular stitch. Glue was used for final skin closure at all sites. The patient was awakened, extubated and transferred to the recovery room in awake and stable condition.               Disposition: To recovery in stable condition        Brenden Kaur MD     Date: 8/1/2023  Time: 10:06 CDMATIAS

## 2023-08-05 ENCOUNTER — TRANSCRIBE ORDERS (OUTPATIENT)
Dept: CT IMAGING | Facility: HOSPITAL | Age: 43
End: 2023-08-05
Payer: COMMERCIAL

## 2023-08-05 DIAGNOSIS — R91.1 NODULE OF LEFT LUNG: Primary | ICD-10-CM

## 2023-08-09 ENCOUNTER — OFFICE VISIT (OUTPATIENT)
Dept: SURGERY | Facility: CLINIC | Age: 43
End: 2023-08-09
Payer: COMMERCIAL

## 2023-08-09 VITALS
HEIGHT: 65 IN | DIASTOLIC BLOOD PRESSURE: 82 MMHG | HEART RATE: 77 BPM | TEMPERATURE: 96.8 F | WEIGHT: 256 LBS | SYSTOLIC BLOOD PRESSURE: 134 MMHG | BODY MASS INDEX: 42.65 KG/M2

## 2023-08-09 DIAGNOSIS — K43.2 INCISIONAL HERNIA, WITHOUT OBSTRUCTION OR GANGRENE: Primary | ICD-10-CM

## 2023-08-09 PROCEDURE — 99024 POSTOP FOLLOW-UP VISIT: CPT | Performed by: SURGERY

## 2023-08-09 PROCEDURE — 1159F MED LIST DOCD IN RCRD: CPT | Performed by: SURGERY

## 2023-08-09 PROCEDURE — 1160F RVW MEDS BY RX/DR IN RCRD: CPT | Performed by: SURGERY

## 2023-08-09 NOTE — PROGRESS NOTES
43-year-old female who is now over a week out from her robotic incisional hernia repair right lower quadrant.  Overall patient is doing well.  Incisions are clean and they are healing nicely.  Abdomen is soft.  She has good support in her abdominal wall.  She will follow-up with us on appearing basis

## 2023-08-11 ENCOUNTER — HOSPITAL ENCOUNTER (OUTPATIENT)
Dept: CT IMAGING | Facility: HOSPITAL | Age: 43
Discharge: HOME | End: 2023-08-11
Payer: COMMERCIAL

## 2023-08-11 ENCOUNTER — LAB (OUTPATIENT)
Dept: LAB | Facility: HOSPITAL | Age: 43
End: 2023-08-11
Payer: COMMERCIAL

## 2023-08-11 DIAGNOSIS — R91.1 NODULE OF LEFT LUNG: ICD-10-CM

## 2023-08-11 LAB
BUN SERPL-MCNC: 12 MG/DL (ref 6–20)
CREAT SERPL-MCNC: 0.8 MG/DL (ref 0.57–1)
EGFRCR SERPLBLD CKD-EPI 2021: 93.3 ML/MIN/1.73

## 2023-08-11 PROCEDURE — 36415 COLL VENOUS BLD VENIPUNCTURE: CPT

## 2023-08-11 PROCEDURE — 71260 CT THORAX DX C+: CPT

## 2023-08-11 PROCEDURE — 84520 ASSAY OF UREA NITROGEN: CPT

## 2023-08-11 PROCEDURE — 25510000001 IOPAMIDOL 61 % SOLUTION: Performed by: OBSTETRICS & GYNECOLOGY

## 2023-08-11 PROCEDURE — 82565 ASSAY OF CREATININE: CPT

## 2023-08-11 RX ADMIN — IOPAMIDOL 90 ML: 612 INJECTION, SOLUTION INTRAVENOUS at 13:52

## 2023-08-15 ENCOUNTER — OUTSIDE FACILITY SERVICE (OUTPATIENT)
Dept: WOUND CARE | Facility: HOSPITAL | Age: 43
End: 2023-08-15
Payer: COMMERCIAL

## 2023-08-15 ENCOUNTER — OFFICE VISIT (OUTPATIENT)
Dept: WOUND CARE | Facility: HOSPITAL | Age: 43
End: 2023-08-15
Payer: COMMERCIAL

## 2023-08-15 PROCEDURE — G0463 HOSPITAL OUTPT CLINIC VISIT: HCPCS

## 2023-08-16 ENCOUNTER — HOSPITAL ENCOUNTER (EMERGENCY)
Facility: HOSPITAL | Age: 43
Discharge: HOME | End: 2023-08-16
Attending: EMERGENCY MEDICINE
Payer: COMMERCIAL

## 2023-08-16 ENCOUNTER — APPOINTMENT (OUTPATIENT)
Dept: CT IMAGING | Facility: HOSPITAL | Age: 43
End: 2023-08-16
Payer: COMMERCIAL

## 2023-08-16 VITALS
RESPIRATION RATE: 18 BRPM | HEIGHT: 64 IN | SYSTOLIC BLOOD PRESSURE: 158 MMHG | OXYGEN SATURATION: 100 % | TEMPERATURE: 97.9 F | BODY MASS INDEX: 43.54 KG/M2 | HEART RATE: 68 BPM | DIASTOLIC BLOOD PRESSURE: 85 MMHG | WEIGHT: 255 LBS

## 2023-08-16 DIAGNOSIS — K43.9 VENTRAL HERNIA WITHOUT OBSTRUCTION OR GANGRENE: Primary | ICD-10-CM

## 2023-08-16 LAB
ALBUMIN SERPL-MCNC: 3.9 G/DL (ref 3.5–5.2)
ALBUMIN/GLOB SERPL: 0.9 G/DL
ALP SERPL-CCNC: 103 U/L (ref 39–117)
ALT SERPL W P-5'-P-CCNC: 15 U/L (ref 1–33)
ANION GAP SERPL CALCULATED.3IONS-SCNC: 10 MMOL/L (ref 5–15)
AST SERPL-CCNC: 14 U/L (ref 1–32)
BACTERIA UR QL AUTO: ABNORMAL /HPF
BASOPHILS # BLD AUTO: 0.06 10*3/MM3 (ref 0–0.2)
BASOPHILS NFR BLD AUTO: 0.8 % (ref 0–1.5)
BILIRUB SERPL-MCNC: 0.4 MG/DL (ref 0–1.2)
BILIRUB UR QL STRIP: NEGATIVE
BUN SERPL-MCNC: 11 MG/DL (ref 6–20)
BUN/CREAT SERPL: 14.1 (ref 7–25)
CALCIUM SPEC-SCNC: 9.3 MG/DL (ref 8.6–10.5)
CHLORIDE SERPL-SCNC: 102 MMOL/L (ref 98–107)
CLARITY UR: CLEAR
CO2 SERPL-SCNC: 25 MMOL/L (ref 22–29)
COLOR UR: YELLOW
CREAT SERPL-MCNC: 0.78 MG/DL (ref 0.57–1)
D-LACTATE SERPL-SCNC: 0.9 MMOL/L (ref 0.5–2)
DEPRECATED RDW RBC AUTO: 37.6 FL (ref 37–54)
EGFRCR SERPLBLD CKD-EPI 2021: 96.2 ML/MIN/1.73
EOSINOPHIL # BLD AUTO: 0.15 10*3/MM3 (ref 0–0.4)
EOSINOPHIL NFR BLD AUTO: 2 % (ref 0.3–6.2)
ERYTHROCYTE [DISTWIDTH] IN BLOOD BY AUTOMATED COUNT: 12.9 % (ref 12.3–15.4)
GLOBULIN UR ELPH-MCNC: 4.5 GM/DL
GLUCOSE SERPL-MCNC: 91 MG/DL (ref 65–99)
GLUCOSE UR STRIP-MCNC: NEGATIVE MG/DL
HCG SERPL QL: NEGATIVE
HCT VFR BLD AUTO: 42.1 % (ref 34–46.6)
HGB BLD-MCNC: 13.7 G/DL (ref 12–15.9)
HGB UR QL STRIP.AUTO: ABNORMAL
HOLD SPECIMEN: NORMAL
HOLD SPECIMEN: NORMAL
HYALINE CASTS UR QL AUTO: ABNORMAL /LPF
IMM GRANULOCYTES # BLD AUTO: 0.03 10*3/MM3 (ref 0–0.05)
IMM GRANULOCYTES NFR BLD AUTO: 0.4 % (ref 0–0.5)
KETONES UR QL STRIP: NEGATIVE
LEUKOCYTE ESTERASE UR QL STRIP.AUTO: NEGATIVE
LIPASE SERPL-CCNC: 27 U/L (ref 13–60)
LYMPHOCYTES # BLD AUTO: 1.65 10*3/MM3 (ref 0.7–3.1)
LYMPHOCYTES NFR BLD AUTO: 21.5 % (ref 19.6–45.3)
MCH RBC QN AUTO: 26.4 PG (ref 26.6–33)
MCHC RBC AUTO-ENTMCNC: 32.5 G/DL (ref 31.5–35.7)
MCV RBC AUTO: 81.1 FL (ref 79–97)
MONOCYTES # BLD AUTO: 0.63 10*3/MM3 (ref 0.1–0.9)
MONOCYTES NFR BLD AUTO: 8.2 % (ref 5–12)
NEUTROPHILS NFR BLD AUTO: 5.17 10*3/MM3 (ref 1.7–7)
NEUTROPHILS NFR BLD AUTO: 67.1 % (ref 42.7–76)
NITRITE UR QL STRIP: NEGATIVE
NRBC BLD AUTO-RTO: 0 /100 WBC (ref 0–0.2)
PH UR STRIP.AUTO: 7 [PH] (ref 5–9)
PLATELET # BLD AUTO: 324 10*3/MM3 (ref 140–450)
PMV BLD AUTO: 8.9 FL (ref 6–12)
POTASSIUM SERPL-SCNC: 4.1 MMOL/L (ref 3.5–5.2)
PROT SERPL-MCNC: 8.4 G/DL (ref 6–8.5)
PROT UR QL STRIP: NEGATIVE
RBC # BLD AUTO: 5.19 10*6/MM3 (ref 3.77–5.28)
RBC # UR STRIP: ABNORMAL /HPF
REF LAB TEST METHOD: ABNORMAL
SODIUM SERPL-SCNC: 137 MMOL/L (ref 136–145)
SP GR UR STRIP: <=1.005 (ref 1–1.03)
SQUAMOUS #/AREA URNS HPF: ABNORMAL /HPF
UROBILINOGEN UR QL STRIP: ABNORMAL
WBC # UR STRIP: ABNORMAL /HPF
WBC NRBC COR # BLD: 7.69 10*3/MM3 (ref 3.4–10.8)
WHOLE BLOOD HOLD COAG: NORMAL
WHOLE BLOOD HOLD SPECIMEN: NORMAL

## 2023-08-16 PROCEDURE — 81001 URINALYSIS AUTO W/SCOPE: CPT

## 2023-08-16 PROCEDURE — 80053 COMPREHEN METABOLIC PANEL: CPT

## 2023-08-16 PROCEDURE — 99285 EMERGENCY DEPT VISIT HI MDM: CPT

## 2023-08-16 PROCEDURE — 96374 THER/PROPH/DIAG INJ IV PUSH: CPT

## 2023-08-16 PROCEDURE — 83605 ASSAY OF LACTIC ACID: CPT

## 2023-08-16 PROCEDURE — 25510000001 IOPAMIDOL 61 % SOLUTION: Performed by: EMERGENCY MEDICINE

## 2023-08-16 PROCEDURE — 96375 TX/PRO/DX INJ NEW DRUG ADDON: CPT

## 2023-08-16 PROCEDURE — 25010000002 MORPHINE PER 10 MG: Performed by: EMERGENCY MEDICINE

## 2023-08-16 PROCEDURE — 84703 CHORIONIC GONADOTROPIN ASSAY: CPT | Performed by: EMERGENCY MEDICINE

## 2023-08-16 PROCEDURE — 83690 ASSAY OF LIPASE: CPT

## 2023-08-16 PROCEDURE — 0 DIATRIZOATE MEGLUMINE & SODIUM PER 1 ML: Performed by: EMERGENCY MEDICINE

## 2023-08-16 PROCEDURE — 74177 CT ABD & PELVIS W/CONTRAST: CPT

## 2023-08-16 PROCEDURE — 25010000002 ONDANSETRON PER 1 MG: Performed by: EMERGENCY MEDICINE

## 2023-08-16 PROCEDURE — 85025 COMPLETE CBC W/AUTO DIFF WBC: CPT

## 2023-08-16 PROCEDURE — 96361 HYDRATE IV INFUSION ADD-ON: CPT

## 2023-08-16 RX ORDER — SODIUM CHLORIDE 0.9 % (FLUSH) 0.9 %
10 SYRINGE (ML) INJECTION AS NEEDED
Status: DISCONTINUED | OUTPATIENT
Start: 2023-08-16 | End: 2023-08-16 | Stop reason: HOSPADM

## 2023-08-16 RX ORDER — SODIUM CHLORIDE 9 MG/ML
125 INJECTION, SOLUTION INTRAVENOUS CONTINUOUS
Status: DISCONTINUED | OUTPATIENT
Start: 2023-08-16 | End: 2023-08-16 | Stop reason: HOSPADM

## 2023-08-16 RX ORDER — DOCUSATE SODIUM 100 MG/1
100 CAPSULE, LIQUID FILLED ORAL 2 TIMES DAILY
Qty: 10 CAPSULE | Refills: 0 | Status: SHIPPED | OUTPATIENT
Start: 2023-08-16 | End: 2023-08-21

## 2023-08-16 RX ORDER — HYDROCODONE BITARTRATE AND ACETAMINOPHEN 5; 325 MG/1; MG/1
1 TABLET ORAL EVERY 6 HOURS PRN
Qty: 6 TABLET | Refills: 0 | Status: SHIPPED | OUTPATIENT
Start: 2023-08-16

## 2023-08-16 RX ORDER — ONDANSETRON 2 MG/ML
4 INJECTION INTRAMUSCULAR; INTRAVENOUS ONCE
Status: COMPLETED | OUTPATIENT
Start: 2023-08-16 | End: 2023-08-16

## 2023-08-16 RX ADMIN — MORPHINE SULFATE 4 MG: 4 INJECTION, SOLUTION INTRAMUSCULAR; INTRAVENOUS at 12:15

## 2023-08-16 RX ADMIN — SODIUM CHLORIDE 125 ML/HR: 9 INJECTION, SOLUTION INTRAVENOUS at 12:14

## 2023-08-16 RX ADMIN — ONDANSETRON 4 MG: 2 INJECTION INTRAMUSCULAR; INTRAVENOUS at 12:15

## 2023-08-16 RX ADMIN — IOPAMIDOL 90 ML: 612 INJECTION, SOLUTION INTRAVENOUS at 14:02

## 2023-08-16 RX ADMIN — DIATRIZOATE MEGLUMINE AND DIATRIZOATE SODIUM 30 ML: 660; 100 LIQUID ORAL; RECTAL at 14:02

## 2023-08-16 NOTE — ED PROVIDER NOTES
"Subjective   History of Present Illness  44yo female pmh significant htn/dvt/fatty liver presents ED POD#15 ventral hernia repair presents ED c/o 2d hx RLQ abdominal pain characterized as \"burning\"/neg exac or relieve factors/neg associated symptoms.  ROS neg fever/chills/n/v/d/chest pain/soa/dysuria.    History provided by:  Patient  Abdominal Pain  Associated symptoms: no diarrhea, no nausea and no vomiting      Review of Systems   Constitutional: Negative.    HENT: Negative.     Eyes: Negative.    Respiratory: Negative.     Cardiovascular: Negative.    Gastrointestinal:  Positive for abdominal pain. Negative for blood in stool, diarrhea, nausea and vomiting.   Genitourinary: Negative.    Musculoskeletal: Negative.    Skin: Negative.    Allergic/Immunologic: Negative for immunocompromised state.   All other systems reviewed and are negative.    Past Medical History:   Diagnosis Date    Dvt femoral (deep venous thrombosis)     Hypertension        No Known Allergies    Past Surgical History:   Procedure Laterality Date     SECTION      CHOLECYSTECTOMY      ORIF ULNAR / RADIAL SHAFT FRACTURE Right     TUBAL ABDOMINAL LIGATION Bilateral     2016       Family History   Problem Relation Age of Onset    Hypertension Mother     Cancer Paternal Grandmother     Heart disease Paternal Grandfather        Social History     Socioeconomic History    Marital status:    Tobacco Use    Smoking status: Former     Types: Cigarettes     Quit date: 2015     Years since quittin.1     Passive exposure: Past    Smokeless tobacco: Never   Vaping Use    Vaping Use: Never used   Substance and Sexual Activity    Alcohol use: Not Currently     Comment: socially    Drug use: No    Sexual activity: Yes     Partners: Male     Comment:            Objective   Physical Exam  Vitals and nursing note reviewed.   Constitutional:       Appearance: Normal appearance.   HENT:      Head: Normocephalic and atraumatic.      " Right Ear: External ear normal.      Left Ear: External ear normal.      Nose: Nose normal.      Mouth/Throat:      Mouth: Mucous membranes are moist.      Pharynx: Oropharynx is clear.   Eyes:      Pupils: Pupils are equal, round, and reactive to light.   Cardiovascular:      Rate and Rhythm: Normal rate and regular rhythm.      Pulses: Normal pulses.      Heart sounds: Normal heart sounds. No murmur heard.    No friction rub. No gallop.   Pulmonary:      Effort: Pulmonary effort is normal. No respiratory distress.      Breath sounds: Normal breath sounds. No wheezing, rhonchi or rales.   Abdominal:      General: Abdomen is protuberant.      Palpations: Abdomen is soft.      Tenderness:  in the right lower quadrant      Hernia: There is no hernia in the umbilical area or ventral area.       Musculoskeletal:         General: No swelling or deformity.      Cervical back: Normal range of motion and neck supple. No rigidity.   Lymphadenopathy:      Cervical: No cervical adenopathy.   Skin:     General: Skin is warm and dry.   Neurological:      General: No focal deficit present.      Mental Status: She is alert and oriented to person, place, and time.      GCS: GCS eye subscore is 4. GCS verbal subscore is 5. GCS motor subscore is 6.      Sensory: Sensation is intact.      Motor: Motor function is intact.       Procedures           ED Course      Labs Reviewed   URINALYSIS W/ MICROSCOPIC IF INDICATED (NO CULTURE) - Abnormal; Notable for the following components:       Result Value    Blood, UA Trace (*)     All other components within normal limits   CBC WITH AUTO DIFFERENTIAL - Abnormal; Notable for the following components:    MCH 26.4 (*)     All other components within normal limits   URINALYSIS, MICROSCOPIC ONLY - Abnormal; Notable for the following components:    RBC, UA 0-2 (*)     All other components within normal limits   LIPASE - Normal   LACTIC ACID, PLASMA - Normal   HCG, SERUM, QUALITATIVE - Normal    RAINBOW DRAW    Narrative:     The following orders were created for panel order [facility] Draw.  Procedure                               Abnormality         Status                     ---------                               -----------         ------                     Green Top (Gel)[[id number]]                                  Final result               Lavender Top[[id number]]                                     Final result               Gold Top - SST[[id number]]                                   Final result               Light Blue Top[[id number]]                                   Final result                 Please view results for these tests on the individual orders.   COMPREHENSIVE METABOLIC PANEL    Narrative:     GFR Normal >60  Chronic Kidney Disease <60  Kidney Failure <15     CBC AND DIFFERENTIAL    Narrative:     The following orders were created for panel order CBC & Differential.  Procedure                               Abnormality         Status                     ---------                               -----------         ------                     CBC Auto Differential[[id number]]        Abnormal            Final result                 Please view results for these tests on the individual orders.   GREEN TOP   LAVENDER TOP   GOLD TOP - SST   LIGHT BLUE TOP     CT Chest With Contrast Diagnostic    Result Date: 8/11/2023  Narrative: CT CHEST W CONTRAST DIAGNOSTIC HISTORY: left lung nodule COMPARISON: 4/24/2019. Automated exposure control was also utilized to decrease patient radiation dose. TECHNIQUE: Axial images through the chest were completed with intravenous contrast. Multiplanar reformatted images were also provided for review. FINDINGS: Neck base: The imaged portion of the neck and thyroid gland is unremarkable. Lungs: Several calcified granulomas unchanged from 4/24/2019 no new or enlarging nodules.. No pleural effusion is present. The trachea and bronchial tree are patent. Heart: Normal in  size and appearance. There is no pericardial effusion. Vasculature: Aorta is normal in caliber and appearance without significant atherosclerosis, stenosis, or aneurysm. The brachiocephalic vessels are normal in appearance. The pulmonary arteries are normal in appearance. Mediastinum and Lymph nodes: No enlarged axillary, hilar, or mediastinal lymph nodes. Bones and soft tissues: No acute osseous or soft tissue abnormality is seen. There are no worrisome osseous lesions. Upper abdomen: No acute process is seen in the visualized upper abdomen.     Impression: 1. No evidence of acute cardiopulmonary process. Several calcified granulomas unchanged from 4/24/2019     CT Abdomen Pelvis With Contrast    Result Date: 8/16/2023  Narrative: CT ABDOMEN PELVIS W CONTRAST HISTORY: abd pain COMPARISON: 7/14/2023 TECHNIQUE: Automated exposure control was used to reduce patient dose CT ABDOMEN PELVIS W CONTRAST FINDINGS: Visualized chest is unremarkable. Liver and pancreas and spleen and adrenal glands are normal. Clips in the gallbladder fossa. Simple cyst in the right kidney. Nonobstructing punctate nephrolith in the right kidney. Uterus and adnexa are unremarkable. And colonic diverticulosis without evidence of diverticulitis. No lymphadenopathy. Urinary bladder unremarkable. No suspicious vascular or osseous lesions. Postsurgical changes of recent ventral hernia repair. There does appear to be a recurrent fat-containing hernia with neck of the hernia measuring 14 mm wide. The hernia sac contains fluid and a small foci of gas. No thick rim enhancement     Impression: Recurrent hernia at site of recent ventral hernia repair. Fluid and small amount of gas within the hernia sac, probably postsurgical in nature although cannot entirely exclude infection in the correct clinical context                                GREGG reviewed by Tobias Lora MD       Medical Decision Making  Labs/radiographic studies reviewed. CBC/CMP/UA:  normal.  CT abd/pelvis: significant recurrent RLQ ventral hernia containing fat/fluid/small amt air without rim enhancement.  Discussed with Dr. Kaur, primary surgeon, who states likely postsurgical changes recommending office followup.  No evidence sepsis/obstruction/hemorrhage/perforation/SIRS criteria/bowel containing ventral hernia/peritonitis.  Plan clear liquid diet x24hrs/norco 5mg prn (#6 tabs)/colace 100mg bid x5d.  Return precautions provided.    Problems Addressed:  Ventral hernia without obstruction or gangrene: complicated acute illness or injury    Amount and/or Complexity of Data Reviewed  Radiology: ordered.    Risk  OTC drugs.  Prescription drug management.        Final diagnoses:   Ventral hernia without obstruction or gangrene       ED Disposition  ED Disposition       ED Disposition   Discharge    Condition   Good    Comment   --               Brenden Kaur MD  [address] [place] [address] Dr  [facility] [address] [place] [id number] [phone]    In 3 days           Medication List        New Prescriptions      docusate sodium 100 MG capsule  Commonly known as: COLACE  Take 1 capsule by mouth 2 (Two) Times a Day for 5 days.            Changed      HYDROcodone-acetaminophen 5-325 MG per tablet  Commonly known as: NORCO  Take 1 tablet by mouth Every 6 (Six) Hours As Needed for Moderate Pain.  What changed:   when to take this  reasons to take this               Where to Get Your Medications        These medications were sent to [facility] - [place], [place] - [email or web address] - [phone]  - [phone] [facility] [email or web address] [facility] [place] [address]      Phone: [phone]   docusate sodium 100 MG capsule  HYDROcodone-acetaminophen 5-325 MG per tablet            Tobias Lora MD  08/16/23 4951

## 2023-08-16 NOTE — DISCHARGE INSTRUCTIONS
Clear liquid diet x24hrs  Return ED abdominal pain, fever, bleeding, vomiting, worse condition, other concerns

## 2023-08-21 ENCOUNTER — OFFICE VISIT (OUTPATIENT)
Dept: SURGERY | Facility: CLINIC | Age: 43
End: 2023-08-21
Payer: COMMERCIAL

## 2023-08-21 VITALS
OXYGEN SATURATION: 96 % | HEIGHT: 64 IN | BODY MASS INDEX: 43.94 KG/M2 | TEMPERATURE: 98 F | DIASTOLIC BLOOD PRESSURE: 76 MMHG | WEIGHT: 257.4 LBS | SYSTOLIC BLOOD PRESSURE: 130 MMHG | HEART RATE: 96 BPM

## 2023-08-21 DIAGNOSIS — Z87.19 H/O HERNIA REPAIR: Primary | ICD-10-CM

## 2023-08-21 DIAGNOSIS — Z98.890 H/O HERNIA REPAIR: Primary | ICD-10-CM

## 2023-08-21 PROCEDURE — 1159F MED LIST DOCD IN RCRD: CPT | Performed by: SURGERY

## 2023-08-21 PROCEDURE — 1160F RVW MEDS BY RX/DR IN RCRD: CPT | Performed by: SURGERY

## 2023-08-21 PROCEDURE — 99024 POSTOP FOLLOW-UP VISIT: CPT | Performed by: SURGERY

## 2023-08-21 RX ORDER — CLOBETASOL PROPIONATE 0.5 MG/G
OINTMENT TOPICAL
COMMUNITY

## 2023-08-21 NOTE — PROGRESS NOTES
43-year-old female who is now 3 weeks out from her robotic incisional hernia repair and a right lower quadrant from previous .  Patient had some worsening of pain last week and presented to the emergency room.  Work-up was significant for concern that she may have a recurrent hernia.  I have reviewed the CT scan that was obtained I think she does not have a recurrent hernia but she has a seroma which would be expected after this type of surgery.  The mesh appears to be intact there does not appear to be a blowout of the mesh that I can appreciate.  Radiologist thought there may be a recurrence of her hernia but I think it is more likely the seroma that I am seeing.  Patient is able to eat she has had bowel movements.    Nontoxic-appearing  Abdomen soft.  Fullness consistent with a seroma in right lower quadrant.    Reviewed CT scan with the patient and her family and answered all their questions.    Recommend observation at this point.  Tylenol and/or NSAID daily.  Follow-up with us in 2 weeks or sooner if she has any other concerns or questions.

## 2023-08-27 LAB
QT INTERVAL: 368 MS
QTC INTERVAL: 424 MS

## 2023-09-13 ENCOUNTER — OFFICE VISIT (OUTPATIENT)
Dept: SURGERY | Facility: CLINIC | Age: 43
End: 2023-09-13
Payer: COMMERCIAL

## 2023-09-13 VITALS
BODY MASS INDEX: 43.54 KG/M2 | SYSTOLIC BLOOD PRESSURE: 128 MMHG | WEIGHT: 255 LBS | HEIGHT: 64 IN | HEART RATE: 86 BPM | TEMPERATURE: 96.6 F | DIASTOLIC BLOOD PRESSURE: 78 MMHG

## 2023-09-13 DIAGNOSIS — Z87.19 H/O HERNIA REPAIR: Primary | ICD-10-CM

## 2023-09-13 DIAGNOSIS — Z98.890 H/O HERNIA REPAIR: Primary | ICD-10-CM

## 2023-09-13 PROCEDURE — 1159F MED LIST DOCD IN RCRD: CPT | Performed by: SURGERY

## 2023-09-13 PROCEDURE — 99212 OFFICE O/P EST SF 10 MIN: CPT | Performed by: SURGERY

## 2023-09-13 PROCEDURE — 1160F RVW MEDS BY RX/DR IN RCRD: CPT | Performed by: SURGERY

## 2023-09-13 NOTE — PROGRESS NOTES
43-year-old female who is now 6 weeks out from her robotic incisional hernia repair right lower quadrant.  Patient is doing much better currently.  She is asking when she can exercise.  She has been told to exercise and that she needed lose weight and she was not sure what she can do.  No GI no  complaints.  No pain at her incision.  On exam she still has a 7 cm mass which I suspect is a seroma that is resolving.  She cannot tell me if this is getting smaller or not.  It does not change with Valsalva like a hernia.  Previous CT scan suggest that she had a recurrent hernia but I think this may be a seroma that is present and there may be fat that was left in the space and not completely removed at the time of her hernia repair.  Clinically she clearly is much better.  We talked about what exercise she can do.  She will follow-up with us in 2 months or sooner if she has any other concerns or questions

## 2024-03-22 NOTE — DISCHARGE INSTRUCTIONS
Continue anticoagulation.  Followup with your provider in one week for repeat ultrasound should symptoms persist.  Return with any new or worsening symptoms, or any concerns.      
[[email or web address]_OB_FT:[id number]],[NS_DeliveryAssist1_OBGYN_ALL_[email or web address]:[id number]]